# Patient Record
Sex: FEMALE | Race: OTHER | HISPANIC OR LATINO | Employment: FULL TIME | URBAN - METROPOLITAN AREA
[De-identification: names, ages, dates, MRNs, and addresses within clinical notes are randomized per-mention and may not be internally consistent; named-entity substitution may affect disease eponyms.]

---

## 2022-07-08 DIAGNOSIS — J40 BRONCHITIS: Primary | ICD-10-CM

## 2022-07-08 RX ORDER — AZITHROMYCIN 250 MG/1
TABLET, FILM COATED ORAL
Qty: 6 TABLET | Refills: 0 | Status: SHIPPED | OUTPATIENT
Start: 2022-07-08 | End: 2022-07-13

## 2022-07-08 RX ORDER — METHYLPREDNISOLONE 4 MG/1
TABLET ORAL
Qty: 21 EACH | Refills: 0 | Status: SHIPPED | OUTPATIENT
Start: 2022-07-08 | End: 2023-05-18 | Stop reason: ALTCHOICE

## 2022-11-11 DIAGNOSIS — B37.31 VAGINAL YEAST INFECTION: Primary | ICD-10-CM

## 2022-11-11 RX ORDER — FLUCONAZOLE 150 MG/1
TABLET ORAL
Qty: 3 TABLET | Refills: 1 | Status: SHIPPED | OUTPATIENT
Start: 2022-11-11 | End: 2022-11-17

## 2023-03-05 DIAGNOSIS — R30.0 DYSURIA: Primary | ICD-10-CM

## 2023-03-05 DIAGNOSIS — B37.31 VAGINAL YEAST INFECTION: ICD-10-CM

## 2023-03-05 RX ORDER — CEPHALEXIN 500 MG/1
500 CAPSULE ORAL EVERY 12 HOURS SCHEDULED
Qty: 10 CAPSULE | Refills: 0 | Status: SHIPPED | OUTPATIENT
Start: 2023-03-05 | End: 2023-03-10

## 2023-03-05 RX ORDER — FLUCONAZOLE 150 MG/1
150 TABLET ORAL ONCE
Qty: 2 TABLET | Refills: 0 | Status: SHIPPED | OUTPATIENT
Start: 2023-03-05 | End: 2023-03-05

## 2023-05-18 PROBLEM — E66.812 OBESITY, CLASS II, BMI 35-39.9: Status: ACTIVE | Noted: 2020-05-08

## 2023-08-26 ENCOUNTER — APPOINTMENT (OUTPATIENT)
Dept: LAB | Facility: HOSPITAL | Age: 33
End: 2023-08-26
Payer: COMMERCIAL

## 2023-08-26 DIAGNOSIS — Z00.8 HEALTH EXAMINATION IN POPULATION SURVEY: ICD-10-CM

## 2023-08-26 LAB
CHOLEST SERPL-MCNC: 113 MG/DL
EST. AVERAGE GLUCOSE BLD GHB EST-MCNC: 111 MG/DL
HBA1C MFR BLD: 5.5 %
HDLC SERPL-MCNC: 55 MG/DL
LDLC SERPL CALC-MCNC: 49 MG/DL (ref 0–100)
NONHDLC SERPL-MCNC: 58 MG/DL
TRIGL SERPL-MCNC: 43 MG/DL

## 2023-08-26 PROCEDURE — 83036 HEMOGLOBIN GLYCOSYLATED A1C: CPT

## 2023-08-26 PROCEDURE — 80061 LIPID PANEL: CPT

## 2023-08-26 PROCEDURE — 36415 COLL VENOUS BLD VENIPUNCTURE: CPT

## 2023-09-11 ENCOUNTER — TELEPHONE (OUTPATIENT)
Dept: FAMILY MEDICINE CLINIC | Facility: CLINIC | Age: 33
End: 2023-09-11

## 2023-09-11 ENCOUNTER — TELEPHONE (OUTPATIENT)
Dept: BARIATRICS | Facility: CLINIC | Age: 33
End: 2023-09-11

## 2023-09-11 DIAGNOSIS — R05.1 ACUTE COUGH: Primary | ICD-10-CM

## 2023-09-11 RX ORDER — METHYLPREDNISOLONE 4 MG/1
TABLET ORAL
Qty: 21 EACH | Refills: 0 | Status: SHIPPED | OUTPATIENT
Start: 2023-09-11

## 2023-09-11 RX ORDER — AZITHROMYCIN 250 MG/1
TABLET, FILM COATED ORAL
Qty: 6 TABLET | Refills: 0 | Status: SHIPPED | OUTPATIENT
Start: 2023-09-11 | End: 2023-09-16

## 2023-09-11 NOTE — TELEPHONE ENCOUNTER
Patient called stated that she is having a dry cough and some ratting and wheezing in chest, she has tried over the counter cough medication with no relief and she will like to know if you can call something in for her or if she should come in for a visit. Last office visit was 5/12/2023.

## 2023-09-13 ENCOUNTER — OFFICE VISIT (OUTPATIENT)
Dept: BARIATRICS | Facility: CLINIC | Age: 33
End: 2023-09-13
Payer: COMMERCIAL

## 2023-09-13 VITALS
DIASTOLIC BLOOD PRESSURE: 70 MMHG | SYSTOLIC BLOOD PRESSURE: 105 MMHG | WEIGHT: 205 LBS | RESPIRATION RATE: 17 BRPM | HEIGHT: 64 IN | BODY MASS INDEX: 35 KG/M2 | HEART RATE: 82 BPM

## 2023-09-13 DIAGNOSIS — E66.9 OBESITY, CLASS II, BMI 35-39.9: Primary | ICD-10-CM

## 2023-09-13 DIAGNOSIS — R73.03 PREDIABETES: ICD-10-CM

## 2023-09-13 PROBLEM — B96.89 BACTERIAL VAGINOSIS: Status: RESOLVED | Noted: 2017-08-30 | Resolved: 2023-09-13

## 2023-09-13 PROBLEM — R09.81 NASAL CONGESTION: Status: RESOLVED | Noted: 2018-01-10 | Resolved: 2023-09-13

## 2023-09-13 PROBLEM — R44.8 FACIAL PRESSURE: Status: RESOLVED | Noted: 2018-01-10 | Resolved: 2023-09-13

## 2023-09-13 PROBLEM — B37.9 YEAST INFECTION: Status: RESOLVED | Noted: 2017-04-28 | Resolved: 2023-09-13

## 2023-09-13 PROBLEM — N89.8 VAGINA ITCHING: Status: RESOLVED | Noted: 2017-08-30 | Resolved: 2023-09-13

## 2023-09-13 PROBLEM — N76.0 BACTERIAL VAGINOSIS: Status: RESOLVED | Noted: 2017-08-30 | Resolved: 2023-09-13

## 2023-09-13 PROBLEM — R53.83 FATIGUE: Status: RESOLVED | Noted: 2020-05-08 | Resolved: 2023-09-13

## 2023-09-13 PROCEDURE — 99214 OFFICE O/P EST MOD 30 MIN: CPT | Performed by: NURSE PRACTITIONER

## 2023-09-13 NOTE — ASSESSMENT & PLAN NOTE
- Patient is pursuing Conservative Program  - Initial weight loss goal of 5-10% weight loss for improved health  - On Contrave in the past, but did not feel well on it. - She is interested in trying another weight loss medication to help with weight loss. - Patient denies personal history of pancreatitis. Patient also denies personal and family history of thyroid cancer and multiple endocrine neoplasia type 2 (MEN 2 tumor). - Denies history of kidney stones, glaucoma or seizures. - Birth control recommended while on weight loss medications, as pregnancy not recommended while on weight loss medication. Reviewed with her that if she would want to try to conceive in the future, weight loss medication should be discontinued at least 3 months prior to trying to conceive. -Medication agreement signed May 18, 2023.  - Saxenda started May 2023 at weight of 224.1 lbs  - Continue Saxenda 3 mg daily, tolerating well and helping with appetite. - Labs reviewed: A1C and lipid panel 8/26/2023. A1C has reduced to 5.5 from 6.1 with weight loss and Saxenda. Lipid panel all within acceptable range. Initial: 224.1 lbs BMI 38.48  Current: 205 lbs BMI 35.19  Change: -19 lbs  Goal: 170 lbs      Goals:  Do not skip meals. Get protein with each meal.   Restart food logging. 1300 simba/day. Start exercise, such as walking 2 days a week for 10 minutes and gradually increase to goal of 5 days a week for 30 minutes cardiovascular exercise with 2 days resistance training.   Continue Saxenda

## 2023-09-13 NOTE — PROGRESS NOTES
Assessment/Plan:     Obesity, Class II, BMI 35-39.9   - Patient is pursuing Conservative Program  - Initial weight loss goal of 5-10% weight loss for improved health  - On Contrave in the past, but did not feel well on it. - She is interested in trying another weight loss medication to help with weight loss. - Patient denies personal history of pancreatitis. Patient also denies personal and family history of thyroid cancer and multiple endocrine neoplasia type 2 (MEN 2 tumor). - Denies history of kidney stones, glaucoma or seizures. - Birth control recommended while on weight loss medications, as pregnancy not recommended while on weight loss medication. Reviewed with her that if she would want to try to conceive in the future, weight loss medication should be discontinued at least 3 months prior to trying to conceive. -Medication agreement signed May 18, 2023.  - Saxenda started May 2023 at weight of 224.1 lbs  - Continue Saxenda 3 mg daily, tolerating well and helping with appetite. - Labs reviewed: A1C and lipid panel 8/26/2023. A1C has reduced to 5.5 from 6.1 with weight loss and Saxenda. Lipid panel all within acceptable range. Initial: 224.1 lbs BMI 38.48  Current: 205 lbs BMI 35.19  Change: -19 lbs  Goal: 170 lbs      Goals:  Do not skip meals. Get protein with each meal.   Restart food logging. 1300 simba/day. Start exercise, such as walking 2 days a week for 10 minutes and gradually increase to goal of 5 days a week for 30 minutes cardiovascular exercise with 2 days resistance training. Continue Saxenda    Prediabetes  - A1C improved from 6.1 to 5.5 with weight loss and Saxenda. Elliott Johnston was seen today for follow-up. Diagnoses and all orders for this visit:    Obesity, Class II, BMI 35-39.9  -     liraglutide (SAXENDA) injection;  Inject 0.5 mL (3 mg total) under the skin daily  -     Insulin Pen Needle 32G X 4 MM MISC; Use in the morning    Prediabetes  -     liraglutide [facility]) injection; Inject 0.5 mL (3 mg total) under the skin daily  -     Insulin Pen Needle 32G X 4 MM MISC; Use in the morning            Follow up in approximately 2 month nurse visit and 4 months with Non-Surgical Physician/Advanced Practitioner. Subjective:   Chief Complaint   Patient presents with   • Follow-up     MWM 4m f/u; waist-40in       Patient ID: Johnston Goldberg  is a 29 y.o. female with excess weight/obesity here to pursue weight management. Patient is pursuing Conservative Program.   Most recent notes and records were reviewed. HPI    Wt Readings from Last 10 Encounters:   09/13/23 93 kg (205 lb)   06/29/23 101 kg (223 lb 3.2 oz)   05/18/23 102 kg (224 lb 3.2 oz)   05/12/23 102 kg (224 lb 12.8 oz)   01/31/23 103 kg (228 lb)   04/11/22 97.5 kg (215 lb)   01/25/22 101 kg (222 lb)   12/23/21 99.8 kg (220 lb)   08/12/21 96.4 kg (212 lb 9.6 oz)   11/12/20 96.8 kg (213 lb 4.8 oz)     Taking Saxenda 3 mg daily. No negative side effects. Helping with appetite. On Contrave for short period of time in the past, didn't feel well on it. Was food logging, but fell out of it. Was getting around 5685-3881 calories per day.       B- 2 eggs and toast or banana   S- none  L- string cheese or chicken wrap or chicken sandwich with veggies   S- none  D- chicken or beef and salad and portioned starch  S- none    Hydration: 5-6 bottles of water, 1/2 cup coffee with creamer, soda 2-3 per week   Alcohol: very rare on holidays  Smoking: denies  Exercise: nothing formal, but more active in general    Occupation: [employer] work from home - sedentary   Sleep: 7.5-8 hours        Colonoscopy: N/A  Mammogram: N/A          The following portions of the patient's history were reviewed and updated as appropriate: allergies, current medications, past family history, past medical history, past social history, past surgical history, and problem list.    Family History   Problem Relation Age of Onset   • Thyroid disease Mother    • Asthma Mother    • Hypothyroidism Mother    • Diabetes Father    • Brain cancer Maternal Uncle    • Lung cancer Family    • Hypertension Neg Hx    • Heart disease Neg Hx    • Stroke Neg Hx         Review of Systems   HENT: Negative for sore throat. Respiratory: Positive for cough (intermittent due to allergies). Negative for shortness of breath. Cardiovascular: Negative for chest pain and palpitations. Gastrointestinal: Positive for constipation (intermittent). Negative for abdominal pain, diarrhea, nausea and vomiting.        + GERD diet and PRN medication controlled   Musculoskeletal: Negative for arthralgias and back pain. Skin: Negative for rash. Psychiatric/Behavioral: Negative for suicidal ideas (or HI). Denies depression and anxiety       Objective:  /70   Pulse 82   Resp 17   Ht 5' 4" (1.626 m)   Wt 93 kg (205 lb)   BMI 35.19 kg/m²     Physical Exam  Vitals and nursing note reviewed. Constitutional   General appearance: Abnormal.  well developed and obese. Eyes No conjunctival injection. Ears, Nose, Mouth, and Throat Oral mucosa moist.   Pulmonary   Respiratory effort: No increased work of breathing or signs of respiratory distress. Cardiovascular     Examination of extremities for edema and/or varicosities: Normal.  no edema. Abdomen   Abdomen: Abnormal.  The abdomen was obese.     Musculoskeletal   Normal range of motion  Neurological   Gait and station: Normal.    Psychiatric   Orientation to person, place and time: Normal.    Affect: appropriate

## 2023-11-22 ENCOUNTER — CLINICAL SUPPORT (OUTPATIENT)
Dept: FAMILY MEDICINE CLINIC | Facility: CLINIC | Age: 33
End: 2023-11-22
Payer: COMMERCIAL

## 2023-11-22 DIAGNOSIS — Z23 ENCOUNTER FOR IMMUNIZATION: Primary | ICD-10-CM

## 2023-11-22 PROCEDURE — 90471 IMMUNIZATION ADMIN: CPT

## 2023-11-22 PROCEDURE — 90686 IIV4 VACC NO PRSV 0.5 ML IM: CPT

## 2023-12-05 ENCOUNTER — TELEPHONE (OUTPATIENT)
Dept: BARIATRICS | Facility: CLINIC | Age: 33
End: 2023-12-05

## 2024-01-24 ENCOUNTER — OFFICE VISIT (OUTPATIENT)
Dept: BARIATRICS | Facility: CLINIC | Age: 34
End: 2024-01-24
Payer: COMMERCIAL

## 2024-01-24 VITALS
BODY MASS INDEX: 33.02 KG/M2 | HEIGHT: 64 IN | SYSTOLIC BLOOD PRESSURE: 116 MMHG | WEIGHT: 193.4 LBS | RESPIRATION RATE: 16 BRPM | DIASTOLIC BLOOD PRESSURE: 64 MMHG | HEART RATE: 81 BPM

## 2024-01-24 DIAGNOSIS — E66.9 OBESITY (BMI 30.0-34.9): Primary | ICD-10-CM

## 2024-01-24 DIAGNOSIS — R73.03 PREDIABETES: ICD-10-CM

## 2024-01-24 PROBLEM — B37.31 VAGINAL CANDIDIASIS: Status: RESOLVED | Noted: 2021-12-23 | Resolved: 2024-01-24

## 2024-01-24 PROBLEM — E66.811 OBESITY (BMI 30.0-34.9): Status: ACTIVE | Noted: 2020-05-08

## 2024-01-24 PROCEDURE — 99213 OFFICE O/P EST LOW 20 MIN: CPT | Performed by: NURSE PRACTITIONER

## 2024-01-24 NOTE — ASSESSMENT & PLAN NOTE
- Patient is pursuing Conservative Program  - Initial weight loss goal of 5-10% weight loss for improved health  - On Contrave in the past, but did not feel well on it.   - Patient denies personal history of pancreatitis. Patient also denies personal and family history of thyroid cancer and multiple endocrine neoplasia type 2 (MEN 2 tumor).   - Denies history of kidney stones, glaucoma or seizures.   - Birth control recommended while on weight loss medications, as pregnancy not recommended while on weight loss medication.    -Medication agreement signed May 18, 2023.  - Saxenda started May 2023 at weight of 224.1 lbs  - Continue Saxenda 3 mg daily, tolerating well and helping with appetite. She will not be renewing this due to cost. Reviewed that this can be stopped abruptly.   - Discussed other options, such as phentermine and metformin off label. Teratogenicity of Topamax discussed. Wegovy remains on supply chain shortage. Zepbound not currently covered by insurance. She is not interested in trying another medication currently. She will continue to work on lifestyle modifications, but in the future can consider restarting medication if needed.       Initial: 224.1 lbs BMI 38.48  Last visit: 205 lbs BMI 35.19  Current: 193.4 lbs BMI 33.20  Change: -31 lbs (-12 lbs since last OV)  Goal: 170 lbs      Goals:  Do not skip meals.  Get protein with each meal. Can use protein bars or shakes to supplement.  Restart food logging.  1300 simba/day.   Keep up the great work with exercise and gradually increase walking to goal of 5 days per week for 30 minutes. Continue weight training.

## 2024-01-24 NOTE — PROGRESS NOTES
Assessment/Plan:     Obesity (BMI 30.0-34.9)   - Patient is pursuing Conservative Program  - Initial weight loss goal of 5-10% weight loss for improved health  - On Contrave in the past, but did not feel well on it.   - Patient denies personal history of pancreatitis. Patient also denies personal and family history of thyroid cancer and multiple endocrine neoplasia type 2 (MEN 2 tumor).   - Denies history of kidney stones, glaucoma or seizures.   - Birth control recommended while on weight loss medications, as pregnancy not recommended while on weight loss medication.    -Medication agreement signed May 18, 2023.  - Saxenda started May 2023 at weight of 224.1 lbs  - Continue Saxenda 3 mg daily, tolerating well and helping with appetite. She will not be renewing this due to cost. Reviewed that this can be stopped abruptly.   - Discussed other options, such as phentermine and metformin off label. Teratogenicity of Topamax discussed. Wegovy remains on supply chain shortage. Zepbound not currently covered by insurance. She is not interested in trying another medication currently. She will continue to work on lifestyle modifications, but in the future can consider restarting medication if needed.       Initial: 224.1 lbs BMI 38.48  Last visit: 205 lbs BMI 35.19  Current: 193.4 lbs BMI 33.20  Change: -31 lbs (-12 lbs since last OV)  Goal: 170 lbs      Goals:  Do not skip meals.  Get protein with each meal. Can use protein bars or shakes to supplement.  Restart food logging.  1300 simba/day.   Keep up the great work with exercise and gradually increase walking to goal of 5 days per week for 30 minutes. Continue weight training.    Prediabetes  - A1C improved from 6.1 to 5.5 with weight loss and Saxenda.         Ciera was seen today for follow-up.    Diagnoses and all orders for this visit:    Obesity (BMI 30.0-34.9)    Prediabetes              Follow up in approximately  2 month nurse visit and 4 months  with Non-Surgical  Physician/Advanced Practitioner.    Subjective:   Chief Complaint   Patient presents with    Follow-up     MWM- 4mth f/u, Waist 38in       Patient ID: Ciera Maria  is a 33 y.o. female with excess weight/obesity here to pursue weight management.  Patient is pursuing Conservative Program.   Most recent notes and records were reviewed.    HPI    Wt Readings from Last 10 Encounters:   24 87.7 kg (193 lb 6.4 oz)   23 93 kg (205 lb)   23 101 kg (223 lb 3.2 oz)   23 102 kg (224 lb 3.2 oz)   23 102 kg (224 lb 12.8 oz)   23 103 kg (228 lb)   22 97.5 kg (215 lb)   22 101 kg (222 lb)   21 99.8 kg (220 lb)   21 96.4 kg (212 lb 9.6 oz)     Taking Saxenda 3 mg daily. No negative side effects. Helping with appetite. Is currently using last pen of Saxenda. Coupon  and is too costly, therefore she wants to stop the medication after she runs out of it.    On Contrave for short period of time in the past, didn't feel well on it.    Not food logging, but being mindful of portions.       B- 2 eggs and toast and sometimes fruit   S- none  L- skips or chicken salad or turkey wrap or turkey club or stir fernandez    S- none  D- chicken salad or chicken stir fernandez with veggies and rice   S- none    Hydration: 5-6 bottles of water, 1/2 cup coffee with creamer, occ soda   Alcohol: very rare on holidays  Smoking: denies  Exercise: walking on treadmill 3 times per week for 15-20 minutes and weight training     Occupation: [employer] work from home - sedentary   Sleep: 7.5-8 hours        Colonoscopy: N/A  Mammogram: N/A          The following portions of the patient's history were reviewed and updated as appropriate: allergies, current medications, past family history, past medical history, past social history, past surgical history, and problem list.    Family History   Problem Relation Age of Onset    Thyroid disease Mother     Asthma Mother     Hypothyroidism Mother   "   Diabetes Father     Brain cancer Maternal Uncle     Lung cancer Family     Hypertension Neg Hx     Heart disease Neg Hx     Stroke Neg Hx         Review of Systems   HENT:  Negative for sore throat.    Respiratory:  Negative for cough and shortness of breath.    Cardiovascular:  Negative for chest pain and palpitations.   Gastrointestinal:  Positive for constipation (intermittent). Negative for abdominal pain, diarrhea, nausea and vomiting.        + GERD diet and PRN medication controlled   Musculoskeletal:  Negative for arthralgias and back pain.   Skin:  Negative for rash.   Psychiatric/Behavioral:  Negative for suicidal ideas (or HI).         Denies depression and anxiety       Objective:  /64   Pulse 81   Resp 16   Ht 5' 4\" (1.626 m)   Wt 87.7 kg (193 lb 6.4 oz)   BMI 33.20 kg/m²     Physical Exam  Vitals and nursing note reviewed.        Constitutional   General appearance: Abnormal.  well developed and obese.   Eyes No conjunctival injection.   Ears, Nose, Mouth, and Throat Oral mucosa moist.   Pulmonary   Respiratory effort: No increased work of breathing or signs of respiratory distress.     Cardiovascular     Examination of extremities for edema and/or varicosities: Normal.  no edema.   Abdomen   Abdomen: Abnormal.  The abdomen was obese.    Musculoskeletal   Normal range of motion  Neurological   Gait and station: Normal.    Psychiatric   Orientation to person, place and time: Normal.    Affect: appropriate        "

## 2024-02-05 ENCOUNTER — PATIENT MESSAGE (OUTPATIENT)
Dept: BARIATRICS | Facility: CLINIC | Age: 34
End: 2024-02-05

## 2024-02-05 DIAGNOSIS — E66.9 OBESITY (BMI 30.0-34.9): Primary | ICD-10-CM

## 2024-02-08 ENCOUNTER — TELEPHONE (OUTPATIENT)
Dept: BARIATRICS | Facility: CLINIC | Age: 34
End: 2024-02-08

## 2024-02-12 ENCOUNTER — TELEPHONE (OUTPATIENT)
Dept: OBGYN CLINIC | Facility: CLINIC | Age: 34
End: 2024-02-12

## 2024-02-12 NOTE — TELEPHONE ENCOUNTER
Left voicemail for patient to call office to reschedule appointment for yearly on 2/13/24, appointment being canceled due to weather and office will be closed.

## 2024-02-16 ENCOUNTER — TELEPHONE (OUTPATIENT)
Dept: OBGYN CLINIC | Facility: MEDICAL CENTER | Age: 34
End: 2024-02-16

## 2024-02-16 NOTE — TELEPHONE ENCOUNTER
Pt is having vaginal itching and irritation for a week, has history of yeast infection,and wanted to know if there is any OTC she can use.

## 2024-03-01 ENCOUNTER — ANNUAL EXAM (OUTPATIENT)
Age: 34
End: 2024-03-01
Payer: COMMERCIAL

## 2024-03-01 VITALS
SYSTOLIC BLOOD PRESSURE: 120 MMHG | WEIGHT: 201 LBS | HEIGHT: 64 IN | DIASTOLIC BLOOD PRESSURE: 72 MMHG | BODY MASS INDEX: 34.31 KG/M2

## 2024-03-01 DIAGNOSIS — Z01.419 ENCOUNTER FOR GYNECOLOGICAL EXAMINATION: Primary | ICD-10-CM

## 2024-03-01 PROCEDURE — S0612 ANNUAL GYNECOLOGICAL EXAMINA: HCPCS | Performed by: OBSTETRICS & GYNECOLOGY

## 2024-03-01 NOTE — PROGRESS NOTES
ASSESSMENT & PLAN: Ciera Maria is a 34 y.o.  with normal gynecologic exam.    1.  Routine well woman exam done today  2.  Pap and HPV:  The patient's last pap and hpv was .    It was normal.    Pap and cotesting was not done today.    Current ASCCP Guidelines reviewed.   3.  The following were reviewed in today's visit: breast self exam, family planning choices, exercise, and healthy diet.      CC:  Annual Gynecologic Examination    HPI: Ciera Maria is a 34 y.o.  who presents for annual gynecologic examination.  She has the following concerns:  occasional itching at area around clitoris         Health Maintenance:    She wears her seatbelt routinely.    She does perform regular monthly self breast exams.    She feels safe at home.     Past Medical History:   Diagnosis Date    Allergic rhinitis     Asthma     Diabetes mellitus (HCC)     H/O postpartum hemorrhage, currently pregnant     Hx of preeclampsia, prior pregnancy, currently pregnant     Hypertension     Iron deficiency anemia     last assessed 2014    Migraine     Urinary tract infection     Varicella     Visual impairment     Vitamin D deficiency        Past Surgical History:   Procedure Laterality Date    CHOLECYSTECTOMY      WISDOM TOOTH EXTRACTION         Past OB/Gyn History:  OB History          3    Para   2    Term   2            AB   1    Living   2         SAB   1    IAB        Ectopic        Multiple   0    Live Births   2           Obstetric Comments   Preeclampsia, third trimester                 Family History   Problem Relation Age of Onset    Thyroid disease Mother     Asthma Mother     Hypothyroidism Mother     Diabetes Father     Brain cancer Maternal Uncle     Lung cancer Family     Hypertension Neg Hx     Heart disease Neg Hx     Stroke Neg Hx        Social History:  Social History     Socioeconomic History    Marital status: /Civil Union     Spouse name: Not on file    Number of  children: Not on file    Years of education: Not on file    Highest education level: Not on file   Occupational History    Not on file   Tobacco Use    Smoking status: Never    Smokeless tobacco: Never   Vaping Use    Vaping status: Never Used   Substance and Sexual Activity    Alcohol use: Yes     Comment: social    Drug use: No    Sexual activity: Yes     Partners: Male     Birth control/protection: None   Other Topics Concern    Not on file   Social History Narrative    Caffeine use     Social Determinants of Health     Financial Resource Strain: Not on file   Food Insecurity: Not on file   Transportation Needs: Not on file   Physical Activity: Not on file   Stress: Not on file   Social Connections: Not on file   Intimate Partner Violence: Not on file   Housing Stability: Not on file         No Known Allergies      Current Outpatient Medications:     albuterol (PROVENTIL HFA,VENTOLIN HFA) 90 mcg/act inhaler, Inhale 2 puffs as needed for wheezing, Disp: , Rfl:     ascorbic acid (VITAMIN C) 500 mg tablet, Take 500 mg by mouth daily, Disp: , Rfl:     cyanocobalamin (VITAMIN B-12) 1000 MCG tablet, Take 1,000 mcg by mouth daily, Disp: , Rfl:     multivitamin (THERAGRAN) TABS, Take 1 tablet by mouth daily, Disp: , Rfl:     Naltrexone-buPROPion HCl ER 8-90 MG TB12, Take 1 tablet daily by mouth in the morning for one week, then 1 tablet twice a day., Disp: 60 tablet, Rfl: 3      Review of Systems  Constitutional :no fever, feels well, no tiredness, no recent weight gain or loss  ENT: no ear ache, no loss of hearing, no nosebleeds or nasal discharge, no sore throat or hoarseness.  Cardiovascular: no complaints of slow or fast heart beat, no chest pain, no palpitations, no leg claudication or lower extremity edema.  Respiratory: no complaints of shortness of shortness of breath, no MCCANN  Breasts:no complaints of breast pain, breast lump, or nipple discharge  Gastrointestinal: no complaints of abdominal pain, constipation,  "nausea, vomiting, or diarrhea or bloody stools  Genitourinary : no complaints of dysuria, incontinence, pelvic pain, no dysmenorrhea, vaginal discharge or abnormal vaginal bleeding and as noted in HPI.  Musculoskeletal: no complaints of arthralgia, no myalgia, no joint swelling or stiffness, no limb pain or swelling.  Integumentary: no complaints of skin rash or lesion, itching or dry skin  Neurological: no complaints of headache, no confusion, no numbness or tingling, no dizziness or fainting    Objective      /72   Ht 5' 4\" (1.626 m)   Wt 91.2 kg (201 lb)   LMP 02/23/2024 (Approximate)   BMI 34.50 kg/m²   General:   appears stated age, cooperative, alert normal mood and affect   Lungs: Unlabored breathing     Breasts: normal appearance, no masses or tenderness   Abdomen: soft, non-tender, without masses or organomegaly   Vulva: normal, normal female genitalia, Bartholin's, Urethra, [place] normal, no lesions, normal female hair distribution   Vagina: normal vagina, no discharge, exudate, lesion, or erythema   Urethra: normal   Cervix: Normal, no discharge. Nontender.   Uterus: normal size, contour, position, consistency, mobility, non-tender   Adnexa: no mass, fullness, tenderness   Psychiatric orientation to person, place, and time: normal. mood and affect: normal      "

## 2024-03-05 ENCOUNTER — TELEPHONE (OUTPATIENT)
Age: 34
End: 2024-03-05

## 2024-03-05 NOTE — TELEPHONE ENCOUNTER
PA for Contrave    Submitted via    []CMM-KEY    [x]Surescripts-Case ID #  [id number]   []Faxed to plan   []Other website    []Phone call Case ID #      Office notes sent, clinical questions answered. Awaiting determination    Turnaround time for your insurance to make a decision on your Prior Authorization can take 7-21 business days.

## 2024-03-05 NOTE — TELEPHONE ENCOUNTER
Regarding: Weight loss pills  Contact: [phone]  ----- Message from GUILLERMO Pedro sent at 3/5/2024  7:55 AM EST -----  Contrave submitted 2/6/2024. Any news on prior auth?     ----- Message sent from GUILLERMO Pedro to Ciera Maria at 3/5/2024  7:55 AM -----   Gigi KingaraDoroteo for reaching out. I will check with the prior auth team.     Doroteo,  Ella      ----- Message -----       From:Ciera Maria       Sent:3/4/2024 10:16 PM EST         To:Patient Medical Advice Request Message List    Subject:Weight loss pills    Hi Ella.   Any news from the insurance regarding the prior authorization for Contrave ?      ----- Message -----       From:GUILLERMO Goldstein       Sent:2/6/2024  4:16 PM EST         To:Ciera Maria    Subject:Weight loss pills    Gigi Vang,     I sent the Contrave to [facility]. My office will take care of the prior auth. This could take up to 3 weeks for the insurance to process.     Start Contrave 1 tablet daily in the morning for one week, then 1 tablet twice a day. After about one month, give me an update, as I may need to increase it further.     Potential side effects of Contrave: abdominal upset, headache, dizziness, trouble sleeping, increased blood pressure, depression/anxiety, and fatigue. Patient should call/return if he/she develops symptoms of depression/aniety. Patient should have ER evaluation if thoughts of harming self/others occur. You may feel more nauseated if consuming alcohol while taking Contrave.    - Contrave must be weaned off gradually prior to surgery. Please contact the office if you will be having surgery for instructions regarding how to wean Contrave.     Ella Sadler      ----- Message -----       From:Ciera Maria       Sent:2/6/2024 12:43 PM EST         To:Patient Medical Advice Request Message List    Subject:Weight loss pills    [facility]. Thanks again!      ----- Message -----       From:Ella  GUILLERMO Ramirez       Sent:2/6/2024 12:41 PM EST         To:Ciera Maria    Subject:Weight loss pills    Hi Ciera,     No problem, I can submit the Contrave. Which pharmacy is best?    Ella Sadler      ----- Message -----       From:Ciera Maria       Sent:2/6/2024  9:14 AM EST         To:Patient Medical Advice Request Message List    Subject:Weight loss pills    If you can submit it as Contrave and see how much it is that would be great. I would like to try it again because in the past the symptoms were similar to Saxenda and with time those symptoms went away so I am hoping the same with Contrave. I was only on it for about a month so I'd be willing to give it a shot! I appreciate your help Ella. Have a great day!      ----- Message -----       From:GUILLERMO Goldstein       Sent:2/6/2024  9:05 AM EST         To:Ciera Maria    Subject:Weight loss pills    Hi Ciera,     Thanks for reaching out. I had in a previous note that you did not feel well on Contrave in the past, but we can certainly try it again. There is a patient assistance program at [employer] It has been costly for some people even when the insurance covers it, sometimes as much as $100 per month. Another option is to separate the medications (they are both available in generic form) it is considered off label use when I do it this way, but it is much more cost effective. I can submit it as Contrave and see what the cost is or go with the generic route. Let me know which you would prefer.     Ella Sadler      ----- Message -----       From:Ciera Maria       Sent:2/5/2024 10:55 PM EST         To:GUILLERMO Goldstein    Subject:Weight loss pills    Hi Ella.   Hope you are well. I have been researching about the weight loss medication options we discussed and I'd like your thoughts with me trying Contrave. I would like to lose another 20-25 lb and would like to try while taking the medication. Let me know your thoughts and I was  also wondering if you had any idea on the cost of the medication with my insurance and if there is a savings program for the medication.   Thank you!

## 2024-03-08 ENCOUNTER — NURSE TRIAGE (OUTPATIENT)
Age: 34
End: 2024-03-08

## 2024-03-08 DIAGNOSIS — B37.9 YEAST INFECTION: Primary | ICD-10-CM

## 2024-03-08 RX ORDER — FLUCONAZOLE 150 MG/1
150 TABLET ORAL ONCE
Qty: 1 TABLET | Refills: 0 | Status: SHIPPED | OUTPATIENT
Start: 2024-03-08 | End: 2024-03-08

## 2024-03-08 NOTE — TELEPHONE ENCOUNTER
Regarding: vaginal itching  ----- Message from Alysia Wright sent at 3/8/2024  8:11 AM EST -----  Patient has been having vaginal itching for 3 days and has tried Monostat as well. Looking for recommendations as to a script or an appt. Please advise.

## 2024-03-08 NOTE — TELEPHONE ENCOUNTER
Patient seen for annual by Dr. Beth 3/1.  Began 3 days ago with  internal and external odorless itching and burning with thick whit discharge . Has used 3 nights of monostat 7 but symptoms continue to be severe.  Patient requesting prescription for diflucan  and has good results as has history of yeast infection's.   Pharm is [facility].

## 2024-03-15 NOTE — TELEPHONE ENCOUNTER
PA for Contrave Denied    Reason:PA Case: [id number], Status: Denied, Denial Rationale: Your medication request has been denied as it does not appear to meet medically necessary requirements. Plan rules require clinical parameters (diagnosis, lab values, test results, physical exam findings etc) be met for medical necessity approval. Information submitted does not indicate required parameter results were met. Coverage for Contrave 8-90MG Tablet Extended Release 12 Hour* is denied. It does not meet medical necessity. Contrave 8-90MG Tablet Extended Release 12 Hour* has been requested for the treatment of Obesity. The information provided by your prescriber does not meet [facility] Rx's guideline. The guideline used is: Weight Loss Agents Prior Authorization with Quantity Limit and Coverage Exception Criteria. Information provided does not show that the policy requirements have been met. The requirement(s) not met are: - prior trial of a weight loss agent for a previous course of treatment in the past 12 months AND prescriber anticipates success with repeating treatment; AND ONE of the following: The trial and failure of at least ONE MORE formulary alternative medications such as: - Phentermine - Qsymia - Wegovy; OR information stating that ALL available formulary alternatives are not recommended, likely to be less effective, or will cause an harmful reaction or other harm to the patient (detailed rationale must be provided). *Please note: Preferred formulary alternatives may require a prior authorization review. Therefore, coverage for Contrave 8-90MG Tablet Extended Release 12 Hour* is denied.         Message sent to office clinical pool Yes    Denial letter scanned into Media Yes    Appeal started No

## 2024-03-19 ENCOUNTER — TELEPHONE (OUTPATIENT)
Dept: BARIATRICS | Facility: CLINIC | Age: 34
End: 2024-03-19

## 2024-03-19 NOTE — TELEPHONE ENCOUNTER
Called patient and left a message to give the office a call back to reschedule her 3/22/24 Nurse Visit appointment that she had cancelled through My Chart.

## 2024-03-26 ENCOUNTER — TELEPHONE (OUTPATIENT)
Dept: FAMILY MEDICINE CLINIC | Facility: CLINIC | Age: 34
End: 2024-03-26

## 2024-03-26 DIAGNOSIS — H10.30 ACUTE CONJUNCTIVITIS, UNSPECIFIED ACUTE CONJUNCTIVITIS TYPE, UNSPECIFIED LATERALITY: Primary | ICD-10-CM

## 2024-03-26 RX ORDER — NEOMYCIN SULFATE, POLYMYXIN B SULFATE AND DEXAMETHASONE 3.5; 10000; 1 MG/ML; [USP'U]/ML; MG/ML
SUSPENSION/ DROPS OPHTHALMIC
Qty: 5 ML | Refills: 0 | Status: SHIPPED | OUTPATIENT
Start: 2024-03-26

## 2024-03-26 NOTE — TELEPHONE ENCOUNTER
Patient called stated that her son was diagnosed with pink eye and now she is showing symptoms with her left eye. Can something be called in for her?

## 2024-04-09 ENCOUNTER — HOSPITAL ENCOUNTER (EMERGENCY)
Facility: HOSPITAL | Age: 34
Discharge: HOME | End: 2024-04-09
Attending: EMERGENCY MEDICINE
Payer: COMMERCIAL

## 2024-04-09 VITALS
RESPIRATION RATE: 17 BRPM | SYSTOLIC BLOOD PRESSURE: 122 MMHG | TEMPERATURE: 97 F | OXYGEN SATURATION: 97 % | HEART RATE: 67 BPM | DIASTOLIC BLOOD PRESSURE: 68 MMHG

## 2024-04-09 DIAGNOSIS — R11.2 NAUSEA AND VOMITING: Primary | ICD-10-CM

## 2024-04-09 LAB
EXT PREGNANCY TEST URINE: NEGATIVE
EXT. CONTROL: NORMAL

## 2024-04-09 PROCEDURE — 81025 URINE PREGNANCY TEST: CPT

## 2024-04-09 PROCEDURE — 99283 EMERGENCY DEPT VISIT LOW MDM: CPT

## 2024-04-09 PROCEDURE — 99284 EMERGENCY DEPT VISIT MOD MDM: CPT | Performed by: EMERGENCY MEDICINE

## 2024-04-09 RX ORDER — ONDANSETRON 4 MG/1
4 TABLET, ORALLY DISINTEGRATING ORAL ONCE
Status: COMPLETED | OUTPATIENT
Start: 2024-04-09 | End: 2024-04-09

## 2024-04-09 RX ORDER — ONDANSETRON 4 MG/1
4 TABLET, FILM COATED ORAL EVERY 6 HOURS
Qty: 12 TABLET | Refills: 0 | Status: SHIPPED | OUTPATIENT
Start: 2024-04-09

## 2024-04-09 RX ADMIN — ONDANSETRON 4 MG: 4 TABLET, ORALLY DISINTEGRATING ORAL at 18:06

## 2024-04-09 NOTE — DISCHARGE INSTRUCTIONS
You were evaluated in the Emergency Department today for Nausea and Vomiting.     Can take tylenol for pain control.    Please schedule an appointment with your primary care physician within the next 2-3 days.    Return to the Emergency Department if you experience worsening or uncontrolled pain, fevers 100.4°F or greater, recurrent vomiting, inability to tolerate food or fluids by mouth, bloody stools or vomit, black or tarry stools, or any other concerning symptoms.    Thank you for choosing us for your care.

## 2024-04-09 NOTE — ED PROVIDER NOTES
History  Chief Complaint   Patient presents with    Vomiting     Started bupropion and naltrexone this morning and has vomited x4 since taking them. Reports feeling shaky     34-year-old female with past medical history of asthma, DM, HTN, migraine presents to the ED for evaluation of nausea vomiting and lightheadedness for the past 4 hours after taking naltrexone and bupropion for the first time.  Patient notes she took the medication at 10 AM this morning and developed nausea and vomiting around 1:00.  Patient had 4 episodes of projectile nonbloody nonbilious vomiting.  Patient had an episode of dry heaving and trouble breathing 1 hour prior to arrival.  Patient notes her symptoms have since resolved.  Patient denies recent travels, sick contact exposure, fever, chills, chest pain, abdominal pain, dysuria      History provided by:  Patient   used: No        Prior to Admission Medications   Prescriptions Last Dose Informant Patient Reported? Taking?   albuterol (PROVENTIL HFA,VENTOLIN HFA) 90 mcg/act inhaler   Yes No   Sig: Inhale 2 puffs as needed for wheezing   ascorbic acid (VITAMIN C) 500 mg tablet  Self Yes No   Sig: Take 500 mg by mouth daily   cyanocobalamin (VITAMIN B-12) 1000 MCG tablet  Self Yes No   Sig: Take 1,000 mcg by mouth daily   multivitamin (THERAGRAN) TABS   Yes No   Sig: Take 1 tablet by mouth daily   neomycin-polymyxin-dexamethasone (MAXITROL) ophthalmic suspension   No No   Sig: One drop 4 times daily both eyes      Facility-Administered Medications: None       Past Medical History:   Diagnosis Date    Allergic rhinitis     Asthma     Diabetes mellitus (HCC)     H/O postpartum hemorrhage, currently pregnant     Hx of preeclampsia, prior pregnancy, currently pregnant     Hypertension     Iron deficiency anemia     last assessed 02/28/2014    Migraine     Urinary tract infection     Varicella     Visual impairment     Vitamin D deficiency        Past Surgical History:    Procedure Laterality Date    CHOLECYSTECTOMY      WISDOM TOOTH EXTRACTION         Family History   Problem Relation Age of Onset    Thyroid disease Mother     Asthma Mother     Hypothyroidism Mother     Diabetes Father     Brain cancer Maternal Uncle     Lung cancer Family     Hypertension Neg Hx     Heart disease Neg Hx     Stroke Neg Hx      I have reviewed and agree with the history as documented.    E-Cigarette/Vaping    E-Cigarette Use Never User      E-Cigarette/Vaping Substances    Nicotine No     THC No     CBD No     Flavoring No     Other No     Unknown No      Social History     Tobacco Use    Smoking status: Never    Smokeless tobacco: Never   Vaping Use    Vaping status: Never Used   Substance Use Topics    Alcohol use: Yes     Comment: social    Drug use: No        Review of Systems   Constitutional:  Negative for appetite change, diaphoresis, fever and unexpected weight change.   HENT:  Negative for dental problem, ear pain, facial swelling, sore throat and trouble swallowing.    Eyes:  Negative for pain and visual disturbance.   Respiratory:  Negative for cough, chest tightness and shortness of breath.    Cardiovascular:  Negative for chest pain, palpitations and leg swelling.   Gastrointestinal:  Positive for nausea and vomiting. Negative for abdominal distention, abdominal pain, constipation and diarrhea.   Endocrine: Negative for polyuria.   Genitourinary:  Negative for difficulty urinating, dysuria and hematuria.   Musculoskeletal:  Negative for back pain.   Neurological:  Positive for light-headedness. Negative for dizziness, syncope and headaches.   Psychiatric/Behavioral:  Negative for confusion.    All other systems reviewed and are negative.      Physical Exam  ED Triage Vitals   Temperature Pulse Respirations Blood Pressure SpO2   04/09/24 1724 04/09/24 1724 04/09/24 1724 04/09/24 1726 04/09/24 1724   (!) 97 °F (36.1 °C) 67 17 122/68 97 %      Temp src Heart Rate Source Patient Position -  Orthostatic VS BP Location FiO2 (%)   -- -- 04/09/24 1724 04/09/24 1724 --     Lying Right arm       Pain Score       04/09/24 1724       No Pain             Orthostatic Vital Signs  Vitals:    04/09/24 1724 04/09/24 1726   BP:  122/68   Pulse: 67    Patient Position - Orthostatic VS: Lying        Physical Exam  Vitals and nursing note reviewed.   Constitutional:       General: She is not in acute distress.     Appearance: Normal appearance. She is well-developed. She is not ill-appearing, toxic-appearing or diaphoretic.      Comments: Patient resting comfortably on stretcher in no acute distress.  Patient talking and laughing with her family members.   HENT:      Head: Normocephalic and atraumatic.      Right Ear: External ear normal.      Left Ear: External ear normal.      Nose: Nose normal.      Mouth/Throat:      Mouth: Mucous membranes are moist.   Eyes:      General: No scleral icterus.        Right eye: No discharge.      Extraocular Movements: Extraocular movements intact.      Conjunctiva/sclera: Conjunctivae normal.   Cardiovascular:      Rate and Rhythm: Normal rate and regular rhythm.      Pulses: Normal pulses.      Heart sounds: No murmur heard.  Pulmonary:      Effort: Pulmonary effort is normal. No respiratory distress.      Breath sounds: Normal breath sounds. No wheezing.   Chest:      Chest wall: No tenderness.   Abdominal:      General: Abdomen is flat. There is no distension.      Palpations: Abdomen is soft.      Tenderness: There is no abdominal tenderness.   Musculoskeletal:         General: No swelling, deformity or signs of injury. Normal range of motion.      Cervical back: Normal range of motion and neck supple. No rigidity or tenderness.      Right lower leg: No edema.      Left lower leg: No edema.   Skin:     General: Skin is warm and dry.      Capillary Refill: Capillary refill takes less than 2 seconds.   Neurological:      General: No focal deficit present.      Mental Status: She  is alert and oriented to person, place, and time.      Cranial Nerves: No cranial nerve deficit.      Sensory: No sensory deficit.      Motor: No weakness.      Coordination: Coordination normal.      Gait: Gait normal.   Psychiatric:         Mood and Affect: Mood normal.         Behavior: Behavior normal.         ED Medications  Medications   ondansetron (ZOFRAN-ODT) dispersible tablet 4 mg (4 mg Oral Given 4/9/24 1806)       Diagnostic Studies  Results Reviewed       Procedure Component Value Units Date/Time    POCT pregnancy, urine [[id number]]  (Normal) Resulted: 04/09/24 1815    Lab Status: Final result Updated: 04/09/24 1827     EXT Preg Test, Ur Negative     Control Valid                   No orders to display         Procedures  Procedures      ED Course                             SBIRT 22yo+      Flowsheet Row Most Recent Value   Initial Alcohol Screen: US AUDIT-C     1. How often do you have a drink containing alcohol? 0 Filed at: 04/09/2024 1725   2. How many drinks containing alcohol do you have on a typical day you are drinking?  0 Filed at: 04/09/2024 1725   3a. Male UNDER 65: How often do you have five or more drinks on one occasion? 0 Filed at: 04/09/2024 1725   3b. FEMALE Any Age, or MALE 65+: How often do you have 4 or more drinks on one occassion? 0 Filed at: 04/09/2024 1725   Audit-C Score 0 Filed at: 04/09/2024 1725   [place]: How many times in the past year have you...    Used an illegal drug or used a prescription medication for non-medical reasons? Never Filed at: 04/09/2024 1725                  Medical Decision Making  34-year-old female with past medical history of asthma, DM, HTN, migraine presents to the ED for evaluation of nausea vomiting and lightheadedness for the past 4 hours after taking naltrexone and bupropion for the first time.    Symptoms most consistent with medication side effect.  Will test for urine pregnancy and treat symptomatically with Zofran  Will p.o. challenge  Patient  discharged home with follow-up with weight loss clinic    Amount and/or Complexity of Data Reviewed  Labs: ordered.    Risk  Prescription drug management.          Disposition  Final diagnoses:   Nausea and vomiting     Time reflects when diagnosis was documented in both MDM as applicable and the Disposition within this note       Time User Action Codes Description Comment    4/9/2024  7:22 PM Jae Morrow Add [R11.2] Nausea and vomiting           ED Disposition       ED Disposition   Discharge    Condition   Stable    Date/Time   Tue Apr 9, 2024 1922    Comment   Ciera Maria discharge to home/self care.                   Follow-up Information       Follow up With Specialties Details Why Contact Info    Sumit Ramírez DO Family Medicine   [facility] [address]  [phone]              Discharge Medication List as of 4/9/2024  7:24 PM        CONTINUE these medications which have NOT CHANGED    Details   albuterol (PROVENTIL HFA,VENTOLIN HFA) 90 mcg/act inhaler Inhale 2 puffs as needed for wheezing, Historical Med      ascorbic acid (VITAMIN C) 500 mg tablet Take 500 mg by mouth daily, Historical Med      cyanocobalamin (VITAMIN B-12) 1000 MCG tablet Take 1,000 mcg by mouth daily, Historical Med      multivitamin (THERAGRAN) TABS Take 1 tablet by mouth daily, Historical Med      neomycin-polymyxin-dexamethasone (MAXITROL) ophthalmic suspension One drop 4 times daily both eyes, Normal      buPROPion (Wellbutrin XL) 150 mg 24 hr tablet Take 1 tablet (150 mg total) by mouth every morning, Starting Mon 3/18/2024, Normal      naltrexone (REVIA) 50 mg tablet Take 1/2 tablet by mouth daily in the morning for 2 weeks, then take 1/2 tablet twice a day., Normal           No discharge procedures on file.    PDMP Review         Value Time User    PDMP Reviewed  Yes 3/26/2020 10:05 AM Sumit Ramírez DO             ED Provider  Attending physically available and evaluated Ciera Maria. I managed the  patient along with the ED Attending.    Electronically Signed by           Jae Morrow DO  04/12/24 4032

## 2024-04-09 NOTE — ED ATTENDING ATTESTATION
4/9/2024  I, Greald Orourke MD, saw and evaluated the patient. I have discussed the patient with the resident/non-physician practitioner and agree with the resident's/non-physician practitioner's findings, Plan of Care, and MDM as documented in the resident's/non-physician practitioner's note, except where noted. All available labs and Radiology studies were reviewed.  I was present for key portions of any procedure(s) performed by the resident/non-physician practitioner and I was immediately available to provide assistance.       At this point I agree with the current assessment done in the Emergency Department.  I have conducted an independent evaluation of this patient a history and physical is as follows:  Shortly after taking new medications today for weight loss  Patient experienced several episodes of nonbilious nonbloody vomiting  No complaints of diarrhea urinating normally last bowel movement was this morning prior abdominal surgeries include cholecystectomy no fever no chills no travel history  Exam: Patient appears well-hydrated clinically nontoxic-appearing  Sclerae anicteric mucous membranes are moist  Lungs clear heart regular abdomen soft nontender  Impression   vomiting secondary to medication side effect  Zofran  P.o. challenge  Holding fluids without difficulty after Zofran  Careful return precautions  ED Course         Critical Care Time  Procedures

## 2024-04-10 ENCOUNTER — TELEPHONE (OUTPATIENT)
Dept: BARIATRICS | Facility: CLINIC | Age: 34
End: 2024-04-10

## 2024-05-28 ENCOUNTER — TELEPHONE (OUTPATIENT)
Dept: BARIATRICS | Facility: CLINIC | Age: 34
End: 2024-05-28

## 2024-06-25 ENCOUNTER — OFFICE VISIT (OUTPATIENT)
Dept: FAMILY MEDICINE CLINIC | Facility: CLINIC | Age: 34
End: 2024-06-25
Payer: COMMERCIAL

## 2024-06-25 DIAGNOSIS — M99.05 PELVIC SOMATIC DYSFUNCTION: ICD-10-CM

## 2024-06-25 DIAGNOSIS — M54.50 ACUTE BILATERAL LOW BACK PAIN WITHOUT SCIATICA: Primary | ICD-10-CM

## 2024-06-25 DIAGNOSIS — M99.04 SACRAL REGION SOMATIC DYSFUNCTION: ICD-10-CM

## 2024-06-25 DIAGNOSIS — S39.013A STRAIN OF MUSCLE, FASCIA AND TENDON OF PELVIS, INITIAL ENCOUNTER: ICD-10-CM

## 2024-06-25 DIAGNOSIS — S39.012A STRAIN, SACRAL, INITIAL ENCOUNTER: ICD-10-CM

## 2024-06-25 PROCEDURE — 99213 OFFICE O/P EST LOW 20 MIN: CPT | Performed by: FAMILY MEDICINE

## 2024-06-25 PROCEDURE — 98925 OSTEOPATH MANJ 1-2 REGIONS: CPT | Performed by: FAMILY MEDICINE

## 2024-07-08 NOTE — PROGRESS NOTES
Ambulatory Visit  Name: Ciera Maria      : 1990      MRN: [id number]  Encounter Provider: Sumit Ramírez DO  Encounter Date: 2024   Encounter department: Merged with [facility]    Assessment & Plan   1. Acute bilateral low back pain without sciatica  2. Pelvic somatic dysfunction  3. Strain of muscle, fascia and tendon of pelvis, initial encounter  4. Sacral region somatic dysfunction  5. Strain, sacral, initial encounter       History of Present Illness     LBP        Review of Systems   Musculoskeletal:  Positive for back pain.       Objective     There were no vitals taken for this visit.    Physical Exam  Musculoskeletal:      Comments: Prone: Inferior L psis, sacrum: Rotated R, SB left.     OMT    Performed by: Sumit Ramírez DO  Authorized by: Sumit Ramírez DO  Universal Protocol:  Consent: Verbal consent obtained.  Consent given by: patient      Procedure Details:     Region evaluated and treated:  Sacrum/Pelvis and Pelvis Innominate    Sacrum/Pelvis details:     Examination Method:  Tenderness, Pain and Asymmetry, Misalignment, Crepitation, Defects, Masses    Severity:  Mild    Treatment Method:  Muscle Energy Treatment    Response:  Resolved - The somatic dysfunction is completely resolved without evidence of it ever having been present.    Pelvis Innominate details:     Examination Method:  Asymmetry, Misalignment, Crepitation, Defects, Masses    Severity:  Mild    Treatment Method:  Muscle Energy Treatment    Response:  Resolved - The somatic dysfunction is completely resolved without evidence of it ever having been present.    Total Regions Treated:  2      Administrative Statements

## 2024-09-05 ENCOUNTER — APPOINTMENT (OUTPATIENT)
Dept: LAB | Facility: CLINIC | Age: 34
End: 2024-09-05

## 2024-09-05 DIAGNOSIS — Z00.8 HEALTH EXAMINATION IN POPULATION SURVEY: ICD-10-CM

## 2024-09-05 LAB
CHOLEST SERPL-MCNC: 123 MG/DL
EST. AVERAGE GLUCOSE BLD GHB EST-MCNC: 114 MG/DL
HBA1C MFR BLD: 5.6 %
HDLC SERPL-MCNC: 62 MG/DL
LDLC SERPL CALC-MCNC: 52 MG/DL (ref 0–100)
NONHDLC SERPL-MCNC: 61 MG/DL
TRIGL SERPL-MCNC: 46 MG/DL

## 2024-09-05 PROCEDURE — 36415 COLL VENOUS BLD VENIPUNCTURE: CPT

## 2024-09-05 PROCEDURE — 80061 LIPID PANEL: CPT

## 2024-09-05 PROCEDURE — 83036 HEMOGLOBIN GLYCOSYLATED A1C: CPT

## 2024-10-14 ENCOUNTER — APPOINTMENT (OUTPATIENT)
Dept: LAB | Facility: CLINIC | Age: 34
End: 2024-10-14
Payer: COMMERCIAL

## 2024-10-14 DIAGNOSIS — Z32.01 POSITIVE PREGNANCY TEST: Primary | ICD-10-CM

## 2024-10-14 LAB
B-HCG SERPL-ACNC: ABNORMAL MIU/ML (ref 0–5)
PROGEST SERPL-MCNC: 12.89 NG/ML

## 2024-10-14 PROCEDURE — 84702 CHORIONIC GONADOTROPIN TEST: CPT

## 2024-10-14 PROCEDURE — 84144 ASSAY OF PROGESTERONE: CPT

## 2024-10-14 PROCEDURE — 36415 COLL VENOUS BLD VENIPUNCTURE: CPT

## 2024-10-16 ENCOUNTER — APPOINTMENT (OUTPATIENT)
Dept: LAB | Facility: CLINIC | Age: 34
End: 2024-10-16
Payer: COMMERCIAL

## 2024-10-16 LAB — B-HCG SERPL-ACNC: ABNORMAL MIU/ML (ref 0–5)

## 2024-10-16 PROCEDURE — 84702 CHORIONIC GONADOTROPIN TEST: CPT

## 2024-10-16 PROCEDURE — 36415 COLL VENOUS BLD VENIPUNCTURE: CPT

## 2024-11-06 ENCOUNTER — ULTRASOUND (OUTPATIENT)
Dept: OBGYN CLINIC | Facility: MEDICAL CENTER | Age: 34
End: 2024-11-06
Payer: COMMERCIAL

## 2024-11-06 VITALS
WEIGHT: 212.2 LBS | BODY MASS INDEX: 36.23 KG/M2 | SYSTOLIC BLOOD PRESSURE: 122 MMHG | HEIGHT: 64 IN | DIASTOLIC BLOOD PRESSURE: 72 MMHG

## 2024-11-06 DIAGNOSIS — N91.2 AMENORRHEA: Primary | ICD-10-CM

## 2024-11-06 DIAGNOSIS — Z32.01 POSITIVE PREGNANCY TEST: ICD-10-CM

## 2024-11-06 PROCEDURE — 99214 OFFICE O/P EST MOD 30 MIN: CPT | Performed by: OBSTETRICS & GYNECOLOGY

## 2024-11-06 PROCEDURE — 76817 TRANSVAGINAL US OBSTETRIC: CPT | Performed by: OBSTETRICS & GYNECOLOGY

## 2024-11-06 NOTE — PROGRESS NOTES
"Pregnancy Confirmation Visit  [facility] [address], [place], [place]    Assessment/Plan:  34 y.o.  presenting with missed menses.  Viable pregnancy 8w0d by LMP consistent with ultrasound today.  - Continue/start prenatal vitamin  - Schedule prenatal nursing Intake in 2 weeks  - Initial prenatal visit in 4 weeks    Subjective:    CC: Missed period    Ciera Maria is a 34 y.o.  who presents with missed menses.  LMP Patient's last menstrual period was 2024..      Patient notes that this pregnancy was planned and desired.  She was not using contraception at the time of conception. She reports she is certain of her LMP and that she has regular menses. She has had vaginal bleeding 3 days ago associated with a BM.     Objective:  /72   Ht 5' 4\" (1.626 m)   Wt 96.3 kg (212 lb 3.2 oz)   LMP 2024   BMI 36.42 kg/m²     Physical Exam:  General: Well appearing, no distress  CV: Regular rate  Respiratory: Unlabored breathing  Abdomen: Soft, nontender  Extremities: Without edema  Mood and Affect: Appropriate    Transvaginal Pelvic Ultrasound  [place] IUP  Yolk sac: Present  Fetal Pole: Present  CRL consistent with EGA 8w4d  Cardiac activity: Present   bpm  No adnexal masses appreciated  "

## 2024-11-12 ENCOUNTER — TELEPHONE (OUTPATIENT)
Age: 34
End: 2024-11-12

## 2024-11-12 NOTE — TELEPHONE ENCOUNTER
Called patient and made her aware that I added her request for a flu shot on her Intake appointment, as per Freda.

## 2024-11-12 NOTE — TELEPHONE ENCOUNTER
Patient is coming in for an OB Intake on 11/18/2024, want to know if she can get the Flu shot at that appt. Warm transfer to office [facility], unsuccessful. Please call patient back to follow up.

## 2024-11-15 NOTE — PROGRESS NOTES
OB INTAKE INTERVIEW 2024    Patient is 34 y.o. who presents for OB intake at 9w5d.  She is not accompanied by anyone during this encounter.  The father of her baby (Ramos Maria) is involved in the pregnancy.      Patient's last menstrual period was 2024.  Ultrasound: Measured 8  weeks 4 days on 2024   Estimated Date of Delivery: 25 confirmed by dating ultrasound.     Signs/Symptoms of Pregnancy  Current pregnancy symptoms: breast tenderness, fatigue, nausea, frequent urination, and constipation  Headaches: no  Cramping: YES - very mild  Spotting: YES - in early pregnancy  PICA cravings: no    Diabetes:  Body mass index is 37.14 kg/m².  If patient has 1 or more, please order early 1 hour GTT  History of GDM: YES - A2GDM  BMI >35 YES - initial BMI 37  History of PCOS or current metformin use: no  History of LGA/macrosomic infant (4000g/9lbs): no    If patient has 2 or more, please order early 1 hour GTT  BMI>30 YES - initial BMI 37  AMA: no  First degree relative with type 2 diabetes: no  History of chronic HTN, hyperlipidemia, elevated A1C: no  High risk race (, , ,  or ): YES -     Hypertension: if you answer yes to any of the following, please order baseline preeclampsia labs (cbc, comprehensive metabolic panel, urine protein creatinine ratio, uric acid)  History of of chronic HTN: no  History of gestational HTN: YES  History of preeclampsia, eclampsia, or HELLP syndrome: YES - mild Pre-e in prior pregnancy  History of diabetes: no  History of lupus,sjogrens syndrome, kidney disease no    Thyroid: if yes order TSH with reflex T4  History of thyroid disease: no    Bleeding Disorder or Hx of DVT - patient or first degree relative with history of. Order the following if not done previously.   (Factor V, antithrombin III, prothrombin gene mutation, protein C and S Ag, lupus anticoagulant, anticardiolipin, beta-2  glycoprotein):   no    OB/GYN:  History of abnormal pap smear: no       Date of last pap smear: 2023  History of HPV: no  History of Herpes/HSV: no  History of other STI: (gonorrhea, chlamydia, trich) no  History of prior : YES x2  History of prior : no  History of  delivery prior to 36 weeks 6 days: no  History of blood transfusion: no  Ok for blood transfusion: YES    Substance screening-   History of tobacco use no  Currently using tobacco no  Substance Use Screen Level:  No Risk    MRSA Screening:   Does the pt have a hx of MRSA? no    Mental Health:  Hx of/or current dx of depression: no  Hx of/or current dx of anxiety: no  Medications: no   EPDS Screen:  Negative / score: 2    Immunizations:  Influenza vaccine given this season: YES requesting today  Discussed Tdap vaccine:  YES  Discussed COVID Vaccine:  YES - had in the past  History of Varicella or Vaccination had varicella vaccine    Genetic/MFM:  Do you or your partner have a history of any of the following in yourselves or first degree relatives?  Cystic fibrosis: no  Spinal muscular atrophy: no  Hemoglobinopathy/Sickle Cell/Thalassemia: no  Fragile X Intellectual Disability: no    Discussed Carrier Screening being completed once in a lifetime as a standard of care lab test. Place orders for Cystic Fibrosis Gene Test (LEG018) and Spinal Muscular Atrophy DNA (GXB6263).  Patient was informed that prior authorization needs to be completed for these tests and this may take 7-10 business days.  Patient does desire testing for Cystic Fibrosis and Spinal Muscular Atrophy.    ACOG Patient Education Cystic Fibrosis and Spinal Muscular Atrophy prenatal screening given.    Appointment for Nuchal Translucency Ultrasound at [facility] is scheduled for 24.    Interview education:  [facility] Pregnancy Essentials Book reviewed, discussed and attached to their AVS: YES     Nurse/Family Partnership-patient may qualify NO; referral placed NO      Prenatal lab work scripts: YES    Extra labs ordered: Cystic Fibrosis gene test, Spinal muscular atrophy DNA, Hgb Fractionation Cascade, 1 hour GTT, CMP, Protein/creatinine ratio urine, and Uric Acid    Aspirin/Preeclampsia Screen    Risk Level Risk Factor Recommendation   LOW Prior Uncomplicated full-term delivery no - mild pre-e No Aspirin recommendation        MODERATE Nulliparity no Recommend low-dose aspirin if     BMI>30 YES - initial BMI 37 2 or more moderate risk factors    Family History Preeclampsia (mother/sister) no     35yr old or greater no     Black Race, Concern for SDOH/Low Socioeconomic no     IVF Pregnancy  no     Personal History Risks (low birth weight, prior adverse preg outcome, >10yr preg interval) no         HIGH History of Preeclampsia YES Recommend low-dose aspirin if     Multifetal gestation no 1 or more high risk factors    Chronic HTN no     Type 1 or 2 Diabetes no     Renal Disease no     Autoimmune Disease  no      Contraindications to ASA therapy:  NSAID/ ASA allergy: no  Nasal polyps: no  Asthma with history of ASA induced bronchospasm: no    Relative contraindications:  History of GI bleed: no  Active peptic ulcer disease: no  Severe hepatic dysfunction: no    Patient does meet recommendation to take ASA 162mg during this pregnancy from 12-36 wks to lower her risk of preeclampsia.  Instructions given and patient verbalized understanding.    The patient has a history now or in prior pregnancy notable for:  Obesity, hx Asthma, hx Fe Deficiency hx anemia, hx elevated A1C, hx A2GDM, hx PPH, hx Pre-e in prior pregnancy        Details that I feel the provider should be aware of: Ciera was seen here in office for her OB Intake visit, Hx obtained, Offered no c/o at present. Reviewed medications safe to take in pregnancy. [facility] Essentials packet/link reviewed. [facility] Baby and Me classes reviewed and how to register for classes, at pt request flu vaccine given today, pt tolerated well.  Additional labs for prior hx ordered - 1 hour GTT, pre-e baseline    PN1 visit scheduled. The patient was oriented to our practice, the navigator role, reviewed delivering physicians and [facility] for delivery. All questions were answered.    Interviewed by: Bethayn Albert RN

## 2024-11-15 NOTE — PATIENT INSTRUCTIONS
Congratulations on your pregnancy!  We thank you for allowing us to participate in your care.    NEXT STEPS    Go to the lab to have your prenatal bloodwork competed if you have not already done so.  There is a listing of [facility] Laboratories and locations in your prenatal folder. You may also visit [email or web address] or call [phone].   Please be aware that some insurance companies may require you to go to a specific lab (ex. [facility] or [facility]). You can verify this by contacting your insurance company.   If you have decided to be screened for CF and SMA genetic testing, these tests require prior authorization and scheduling.  Prior Authorization is not a guarantee of payment. There may be out of pocket expenses that includes copays, deductibles and or coinsurance for your individual plan.  Please call [phone] if our team has not contacted you in 7 business days.  Please have your blood work completed prior to you next prenatal visit.    If you have decided to have genetic testing done at Maternal Fetal Medicine, that will be scheduled by [facility]. You may have already scheduled this appointment.  If not, please call their office to schedule this appointment.  Based on the referral placed by our office, they will know how to schedule you appropriately.    Contact information for Maternal Fetal Medicine is located in your prenatal folder. The main phone number to their office is [phone].    Return to our office for your first routine prenatal visit.   [facility] has multiple locations. Always check the address of your appointment to ensure you are going to the correct office.       Warning Signs During Pregnancy - If you experience any problems or concerns, call the office directly.  The list below includes warning signs your providers would like you to be aware of.  If you experience any of these at any time during your pregnancy, please call us as soon as possible.   Vaginal bleeding  Sharp abdominal  pain that does not go away  Fever (more than 100.4?F and is not relieved with Tylenol)  Persistent vomiting lasting greater than 24 hours  Chest pain/Shortness of breath  Pain or burning when you urinate     Call the OFFICE [phone] for any questions/emergencies.  At night or on the weekend, calls go through a triage service, please indicate it is an emergency and the DOCTOR on call will be paged.    Remember to only use MyChart for non-urgent concerns or questions.    Our doctors deliver at [facility] in [place]. The address is provided below.     [address] [address]    Please click on the link below to review our Pregnancy Essential Guide.    [facility] Pregnancy Essentials Guide  [facility] Women's Health ([email or web address])     To learn more about the Prenatal Education classes that [facility] offers, click the link below.  Prenatal Education Classes    Click on the link below to review [facility] Lab locations.  [facility] Lab Locations    [facility] resource  [employer] is a tool to connect you to community resources you may need.      Thank you,   Bethany MERCADO, RN  OB Nurse Navigator

## 2024-11-18 ENCOUNTER — INITIAL PRENATAL (OUTPATIENT)
Dept: OBGYN CLINIC | Facility: MEDICAL CENTER | Age: 34
End: 2024-11-18
Payer: COMMERCIAL

## 2024-11-18 VITALS
WEIGHT: 216.4 LBS | HEIGHT: 64 IN | SYSTOLIC BLOOD PRESSURE: 104 MMHG | DIASTOLIC BLOOD PRESSURE: 60 MMHG | BODY MASS INDEX: 36.95 KG/M2

## 2024-11-18 DIAGNOSIS — Z34.81 MULTIGRAVIDA IN FIRST TRIMESTER: Primary | ICD-10-CM

## 2024-11-18 DIAGNOSIS — O99.210 OBESITY IN PREGNANCY: ICD-10-CM

## 2024-11-18 DIAGNOSIS — Z86.32 HISTORY OF GESTATIONAL DIABETES: ICD-10-CM

## 2024-11-18 DIAGNOSIS — Z23 NEED FOR INFLUENZA VACCINATION: ICD-10-CM

## 2024-11-18 DIAGNOSIS — Z36.9 ENCOUNTER FOR ANTENATAL SCREENING: ICD-10-CM

## 2024-11-18 DIAGNOSIS — Z31.430 ENCOUNTER OF FEMALE FOR TESTING FOR GENETIC DISEASE CARRIER STATUS FOR PROCREATIVE MANAGEMENT: ICD-10-CM

## 2024-11-18 DIAGNOSIS — Z87.59 HISTORY OF PRE-ECLAMPSIA: ICD-10-CM

## 2024-11-18 PROCEDURE — OBC

## 2024-11-18 PROCEDURE — 90656 IIV3 VACC NO PRSV 0.5 ML IM: CPT

## 2024-11-18 PROCEDURE — 90471 IMMUNIZATION ADMIN: CPT

## 2024-11-18 RX ORDER — DOCUSATE SODIUM 100 MG/1
100 CAPSULE, LIQUID FILLED ORAL 2 TIMES DAILY
COMMUNITY

## 2024-12-03 ENCOUNTER — APPOINTMENT (OUTPATIENT)
Dept: LAB | Facility: HOSPITAL | Age: 34
End: 2024-12-03
Payer: COMMERCIAL

## 2024-12-03 ENCOUNTER — APPOINTMENT (OUTPATIENT)
Dept: LAB | Facility: HOSPITAL | Age: 34
End: 2024-12-03
Attending: OBSTETRICS & GYNECOLOGY
Payer: COMMERCIAL

## 2024-12-03 DIAGNOSIS — O99.210 OBESITY IN PREGNANCY: ICD-10-CM

## 2024-12-03 DIAGNOSIS — Z36.9 ENCOUNTER FOR ANTENATAL SCREENING: ICD-10-CM

## 2024-12-03 DIAGNOSIS — Z87.59 HISTORY OF PRE-ECLAMPSIA: ICD-10-CM

## 2024-12-03 DIAGNOSIS — Z86.32 HISTORY OF GESTATIONAL DIABETES: ICD-10-CM

## 2024-12-03 DIAGNOSIS — Z31.430 ENCOUNTER OF FEMALE FOR TESTING FOR GENETIC DISEASE CARRIER STATUS FOR PROCREATIVE MANAGEMENT: ICD-10-CM

## 2024-12-03 DIAGNOSIS — Z34.81 MULTIGRAVIDA IN FIRST TRIMESTER: ICD-10-CM

## 2024-12-03 LAB
ABO GROUP BLD: NORMAL
ALBUMIN SERPL BCG-MCNC: 3.8 G/DL (ref 3.5–5)
ALP SERPL-CCNC: 48 U/L (ref 34–104)
ALT SERPL W P-5'-P-CCNC: 10 U/L (ref 7–52)
ANION GAP SERPL CALCULATED.3IONS-SCNC: 5 MMOL/L (ref 4–13)
AST SERPL W P-5'-P-CCNC: 11 U/L (ref 13–39)
BACTERIA UR QL AUTO: ABNORMAL /HPF
BASOPHILS # BLD AUTO: 0.04 THOUSANDS/ΜL (ref 0–0.1)
BASOPHILS NFR BLD AUTO: 0 % (ref 0–1)
BILIRUB SERPL-MCNC: 0.47 MG/DL (ref 0.2–1)
BILIRUB UR QL STRIP: NEGATIVE
BLD GP AB SCN SERPL QL: NEGATIVE
BUN SERPL-MCNC: 9 MG/DL (ref 5–25)
CALCIUM SERPL-MCNC: 9.3 MG/DL (ref 8.4–10.2)
CHLORIDE SERPL-SCNC: 102 MMOL/L (ref 96–108)
CLARITY UR: CLEAR
CO2 SERPL-SCNC: 26 MMOL/L (ref 21–32)
COLOR UR: YELLOW
CREAT SERPL-MCNC: 0.48 MG/DL (ref 0.6–1.3)
CREAT UR-MCNC: 156.3 MG/DL
EOSINOPHIL # BLD AUTO: 0.26 THOUSAND/ΜL (ref 0–0.61)
EOSINOPHIL NFR BLD AUTO: 3 % (ref 0–6)
ERYTHROCYTE [DISTWIDTH] IN BLOOD BY AUTOMATED COUNT: 13.1 % (ref 11.6–15.1)
GFR SERPL CREATININE-BSD FRML MDRD: 129 ML/MIN/1.73SQ M
GLUCOSE 1H P 50 G GLC PO SERPL-MCNC: 141 MG/DL (ref 70–134)
GLUCOSE SERPL-MCNC: 139 MG/DL (ref 65–140)
GLUCOSE UR STRIP-MCNC: ABNORMAL MG/DL
HBV SURFACE AB SER-ACNC: 12.3 MIU/ML
HBV SURFACE AG SER QL: NORMAL
HCT VFR BLD AUTO: 37.8 % (ref 34.8–46.1)
HCV AB SER QL: NORMAL
HGB BLD-MCNC: 12.6 G/DL (ref 11.5–15.4)
HGB UR QL STRIP.AUTO: NEGATIVE
HIV 1+2 AB+HIV1 P24 AG SERPL QL IA: NORMAL
HIV 2 AB SERPL QL IA: NORMAL
HIV1 AB SERPL QL IA: NORMAL
HIV1 P24 AG SERPL QL IA: NORMAL
IMM GRANULOCYTES # BLD AUTO: 0.06 THOUSAND/UL (ref 0–0.2)
IMM GRANULOCYTES NFR BLD AUTO: 1 % (ref 0–2)
KETONES UR STRIP-MCNC: NEGATIVE MG/DL
LEUKOCYTE ESTERASE UR QL STRIP: ABNORMAL
LYMPHOCYTES # BLD AUTO: 1.59 THOUSANDS/ΜL (ref 0.6–4.47)
LYMPHOCYTES NFR BLD AUTO: 17 % (ref 14–44)
MCH RBC QN AUTO: 30 PG (ref 26.8–34.3)
MCHC RBC AUTO-ENTMCNC: 33.3 G/DL (ref 31.4–37.4)
MCV RBC AUTO: 90 FL (ref 82–98)
MONOCYTES # BLD AUTO: 0.44 THOUSAND/ΜL (ref 0.17–1.22)
MONOCYTES NFR BLD AUTO: 5 % (ref 4–12)
MUCOUS THREADS UR QL AUTO: ABNORMAL
NEUTROPHILS # BLD AUTO: 6.83 THOUSANDS/ΜL (ref 1.85–7.62)
NEUTS SEG NFR BLD AUTO: 74 % (ref 43–75)
NITRITE UR QL STRIP: NEGATIVE
NON-SQ EPI CELLS URNS QL MICRO: ABNORMAL /HPF
NRBC BLD AUTO-RTO: 0 /100 WBCS
PH UR STRIP.AUTO: 6.5 [PH]
PLATELET # BLD AUTO: 252 THOUSANDS/UL (ref 149–390)
PMV BLD AUTO: 9.6 FL (ref 8.9–12.7)
POTASSIUM SERPL-SCNC: 3.1 MMOL/L (ref 3.5–5.3)
PROT SERPL-MCNC: 7.2 G/DL (ref 6.4–8.4)
PROT UR STRIP-MCNC: ABNORMAL MG/DL
PROT UR-MCNC: 15.3 MG/DL
PROT/CREAT UR: 0.1 MG/G{CREAT} (ref 0–0.1)
RBC # BLD AUTO: 4.2 MILLION/UL (ref 3.81–5.12)
RBC #/AREA URNS AUTO: ABNORMAL /HPF
RH BLD: POSITIVE
RUBV IGG SERPL IA-ACNC: 48 IU/ML
SODIUM SERPL-SCNC: 133 MMOL/L (ref 135–147)
SP GR UR STRIP.AUTO: 1.03 (ref 1–1.03)
SPECIMEN EXPIRATION DATE: NORMAL
TREPONEMA PALLIDUM IGG+IGM AB [PRESENCE] IN SERUM OR PLASMA BY IMMUNOASSAY: NORMAL
URATE SERPL-MCNC: 4.1 MG/DL (ref 2–7.5)
UROBILINOGEN UR STRIP-ACNC: <2 MG/DL
WBC # BLD AUTO: 9.22 THOUSAND/UL (ref 4.31–10.16)
WBC #/AREA URNS AUTO: ABNORMAL /HPF

## 2024-12-03 PROCEDURE — 86762 RUBELLA ANTIBODY: CPT

## 2024-12-03 PROCEDURE — 87086 URINE CULTURE/COLONY COUNT: CPT

## 2024-12-03 PROCEDURE — 87340 HEPATITIS B SURFACE AG IA: CPT

## 2024-12-03 PROCEDURE — 83020 HEMOGLOBIN ELECTROPHORESIS: CPT

## 2024-12-03 PROCEDURE — 82950 GLUCOSE TEST: CPT

## 2024-12-03 PROCEDURE — 86803 HEPATITIS C AB TEST: CPT

## 2024-12-03 PROCEDURE — 85025 COMPLETE CBC W/AUTO DIFF WBC: CPT

## 2024-12-03 PROCEDURE — 36415 COLL VENOUS BLD VENIPUNCTURE: CPT

## 2024-12-03 PROCEDURE — 87389 HIV-1 AG W/HIV-1&-2 AB AG IA: CPT

## 2024-12-03 PROCEDURE — 84550 ASSAY OF BLOOD/URIC ACID: CPT

## 2024-12-03 PROCEDURE — 86706 HEP B SURFACE ANTIBODY: CPT

## 2024-12-03 PROCEDURE — 87077 CULTURE AEROBIC IDENTIFY: CPT

## 2024-12-03 PROCEDURE — 80053 COMPREHEN METABOLIC PANEL: CPT

## 2024-12-03 PROCEDURE — 86901 BLOOD TYPING SEROLOGIC RH(D): CPT

## 2024-12-03 PROCEDURE — 84156 ASSAY OF PROTEIN URINE: CPT

## 2024-12-03 PROCEDURE — 81001 URINALYSIS AUTO W/SCOPE: CPT

## 2024-12-03 PROCEDURE — 86780 TREPONEMA PALLIDUM: CPT

## 2024-12-03 PROCEDURE — 86900 BLOOD TYPING SEROLOGIC ABO: CPT

## 2024-12-03 PROCEDURE — 81329 SMN1 GENE DOS/DELETION ALYS: CPT

## 2024-12-03 PROCEDURE — 82570 ASSAY OF URINE CREATININE: CPT

## 2024-12-03 PROCEDURE — 86850 RBC ANTIBODY SCREEN: CPT

## 2024-12-03 PROCEDURE — 81220 CFTR GENE COM VARIANTS: CPT

## 2024-12-04 ENCOUNTER — TELEPHONE (OUTPATIENT)
Age: 34
End: 2024-12-04

## 2024-12-04 LAB — BACTERIA UR CULT: ABNORMAL

## 2024-12-05 ENCOUNTER — NURSE TRIAGE (OUTPATIENT)
Age: 34
End: 2024-12-05

## 2024-12-05 DIAGNOSIS — Z36.9 ENCOUNTER FOR ANTENATAL SCREENING: Primary | ICD-10-CM

## 2024-12-05 DIAGNOSIS — R82.71 GROUP B STREPTOCOCCAL BACTERIURIA: ICD-10-CM

## 2024-12-05 LAB
HGB A MFR BLD: 2.3 % (ref 1.8–3.2)
HGB A MFR BLD: 97.7 % (ref 96.4–98.8)
HGB F MFR BLD: 0 % (ref 0–2)
HGB FRACT BLD-IMP: NORMAL
HGB S MFR BLD: 0 %

## 2024-12-05 NOTE — TELEPHONE ENCOUNTER
"12w1d [facility] 6/18/24 patient states she sees her UA labs and asking for provider to review. Patient denies burning on urination, no odor, no bleeding, no cramping, no fever. Patient does report urinary frequency with pregnancy and she is increasing PO fluids. Patient also states she gets frequent yeast infections- started Monday with white thick clumpy vaginal discharge and itching. Patient will start Monistat 7 tonight.   [facility] sent to lab ordering provider, per patient request, to review urine results.  Answer Assessment - Initial Assessment Questions  1. TEST: \"What kind of urine test was performed?\" (e.g., urinalysis, urine dipstick)       Routine urine   2. URINALYSIS RESULT: \"Was it positive or negative?\"  If positive, document what was positive (e.g., LE, nitrites, WBC, RBC, bacteria, epithelial cells)      Yes, strep  3. FEVER: \"Do you have a fever?\" If Yes, ask: \"What is your temperature, how was it measured, and when did it start?\"      denies  4. FLANK PAIN: \"Do you have any pain in your side?\"      denies  5. OTHER SYMPTOMS: \"Do you have any other symptoms?\" (e.g., blood in urine, vomiting)     Has some vaginal discharge and itching, feels like [place]   6. PREGNANCY: \"Is there any chance you are pregnant?\" \"When was your last menstrual period?\"      12w1d   7. PHENAZOPYRIDINE (Uristat, Pyridium): \"Have you taken phenazopyridine (turns your urine orange) recently?\"      no    Protocols used: Urinalysis Results Follow-Up Call-Adult-[place]    "

## 2024-12-06 NOTE — TELEPHONE ENCOUNTER
Per Dr. Shine, recommends repeat ua culture. Spoke with Ciera, she is not having specific uti symptoms and plans to start monistat over weekend.  She will repeat culture in AM prior to monistat use. Aware to call if develops urinary frequency, painful urination or additonal urinary concerns.

## 2024-12-09 ENCOUNTER — ROUTINE PRENATAL (OUTPATIENT)
Dept: PERINATAL CARE | Facility: CLINIC | Age: 34
End: 2024-12-09
Payer: COMMERCIAL

## 2024-12-09 VITALS
SYSTOLIC BLOOD PRESSURE: 112 MMHG | BODY MASS INDEX: 37.15 KG/M2 | HEART RATE: 97 BPM | WEIGHT: 217.6 LBS | DIASTOLIC BLOOD PRESSURE: 80 MMHG | HEIGHT: 64 IN

## 2024-12-09 DIAGNOSIS — O24.410 DIET CONTROLLED GESTATIONAL DIABETES MELLITUS (GDM) IN FIRST TRIMESTER: ICD-10-CM

## 2024-12-09 DIAGNOSIS — R73.09 ELEVATED GLUCOSE TOLERANCE TEST: ICD-10-CM

## 2024-12-09 DIAGNOSIS — E66.812 CLASS II OBESITY: ICD-10-CM

## 2024-12-09 DIAGNOSIS — Z36.0 ENCOUNTER FOR ANTENATAL SCREENING FOR CHROMOSOMAL ANOMALIES: ICD-10-CM

## 2024-12-09 DIAGNOSIS — O09.891 PREVIOUS PREGNANCY COMPLICATED BY PREGNANCY-INDUCED HYPERTENSION, ANTEPARTUM, FIRST TRIMESTER: ICD-10-CM

## 2024-12-09 DIAGNOSIS — Z3A.12 12 WEEKS GESTATION OF PREGNANCY: Primary | ICD-10-CM

## 2024-12-09 DIAGNOSIS — Z36.82 NUCHAL TRANSLUCENCY OF FETUS ON PRENATAL ULTRASOUND: ICD-10-CM

## 2024-12-09 LAB
CITATION REF LAB TEST: NORMAL
CLINICAL INFO: NORMAL
ETHNIC BACKGROUND STATED: NORMAL
GENE DIS ANL CARRIER INTERP-IMP: NORMAL
GENE MUT TESTED BLD/T: NORMAL
LAB DIRECTOR NAME PROVIDER: NORMAL
REASON FOR REFERRAL (NARRATIVE): NORMAL
RECOMMENDATION PATIENT DOC-IMP: NORMAL
REF LAB TEST METHOD: NORMAL
SERVICE CMNT-IMP: NORMAL
SMN1 GENE MUT ANL BLD/T: NORMAL
SPECIMEN SOURCE: NORMAL

## 2024-12-09 PROCEDURE — 76813 OB US NUCHAL MEAS 1 GEST: CPT | Performed by: OBSTETRICS & GYNECOLOGY

## 2024-12-09 PROCEDURE — 99244 OFF/OP CNSLTJ NEW/EST MOD 40: CPT | Performed by: OBSTETRICS & GYNECOLOGY

## 2024-12-09 PROCEDURE — 76801 OB US < 14 WKS SINGLE FETUS: CPT | Performed by: OBSTETRICS & GYNECOLOGY

## 2024-12-09 NOTE — PROGRESS NOTES
A fetal ultrasound was completed. See Ob procedures in Epic for an interpretation and recommendations. Do not hesitate to contact us in [facility] if you have questions.    Karl Winston MD, MSCE  Maternal Fetal Medicine

## 2024-12-09 NOTE — LETTER
December 9, 2024     Katelyn Shine MD  [address] [place] [address]  [address] [place] [address]    Patient: Ciera Maria   YOB: 1990   Date of Visit: 12/9/2024       Dear Dr. Shine:    Thank you for referring Ciera Maria to me for evaluation. Below are my notes for this consultation.    If you have questions, please do not hesitate to call me. I look forward to following your patient along with you.         Sincerely,        Karl Winston MD        CC: GUILLERMO Edwards MD  12/9/2024  1:16 PM  Sign when Signing Visit  A fetal ultrasound was completed. See Ob procedures in Epic for an interpretation and recommendations. Do not hesitate to contact us in [facility] if you have questions.    Karl Winston MD, MSCE  Maternal Fetal Medicine

## 2024-12-09 NOTE — PROGRESS NOTES
Patient refused NIPT testing at this time in order to speak with her insurance provider about cost. Patient understands to call [phone] to schedule a nurse visit to get bloodwork drawn in office or to  a kit from one of our [facility] offices to have drawn at a [facility]'s lab.

## 2024-12-18 ENCOUNTER — TELEPHONE (OUTPATIENT)
Dept: PERINATAL CARE | Facility: OTHER | Age: 34
End: 2024-12-18

## 2024-12-18 NOTE — TELEPHONE ENCOUNTER
Ernestine 12/18 @ 9:36 am for patient to call the office to schedule a nurse visit to have NIPS blood work done. Stated to patient that she would no longer need the 1/2/25 Genetic Counseling class appointment due to her having the blood work, I was going to go ahead and  cancel that appointment.

## 2024-12-19 ENCOUNTER — OFFICE VISIT (OUTPATIENT)
Dept: PERINATAL CARE | Facility: CLINIC | Age: 34
End: 2024-12-19
Payer: COMMERCIAL

## 2024-12-19 ENCOUNTER — INITIAL PRENATAL (OUTPATIENT)
Dept: OBGYN CLINIC | Facility: MEDICAL CENTER | Age: 34
End: 2024-12-19

## 2024-12-19 ENCOUNTER — RESULTS FOLLOW-UP (OUTPATIENT)
Dept: OBGYN CLINIC | Facility: CLINIC | Age: 34
End: 2024-12-19

## 2024-12-19 VITALS — SYSTOLIC BLOOD PRESSURE: 124 MMHG | BODY MASS INDEX: 37.59 KG/M2 | DIASTOLIC BLOOD PRESSURE: 68 MMHG | WEIGHT: 219 LBS

## 2024-12-19 DIAGNOSIS — R00.2 HEART PALPITATIONS: ICD-10-CM

## 2024-12-19 DIAGNOSIS — Z3A.14 14 WEEKS GESTATION OF PREGNANCY: Primary | ICD-10-CM

## 2024-12-19 DIAGNOSIS — Z36.0 ENCOUNTER FOR ANTENATAL SCREENING FOR CHROMOSOMAL ANOMALIES: Primary | ICD-10-CM

## 2024-12-19 DIAGNOSIS — Z34.91 FIRST TRIMESTER PREGNANCY: ICD-10-CM

## 2024-12-19 DIAGNOSIS — R73.09 ELEVATED GLUCOSE TOLERANCE TEST: ICD-10-CM

## 2024-12-19 PROCEDURE — PNV: Performed by: OBSTETRICS & GYNECOLOGY

## 2024-12-19 PROCEDURE — 87591 N.GONORRHOEAE DNA AMP PROB: CPT | Performed by: OBSTETRICS & GYNECOLOGY

## 2024-12-19 PROCEDURE — 36415 COLL VENOUS BLD VENIPUNCTURE: CPT | Performed by: NURSE PRACTITIONER

## 2024-12-19 PROCEDURE — 87491 CHLMYD TRACH DNA AMP PROBE: CPT | Performed by: OBSTETRICS & GYNECOLOGY

## 2024-12-19 RX ORDER — ASPIRIN 81 MG/1
81 TABLET, CHEWABLE ORAL DAILY
COMMUNITY

## 2024-12-19 NOTE — PROGRESS NOTES
Ciera is a 34 y.o. year old  at 14w1d for first prenatal visit.   Pregnancy was desired. She is currently taking PNV    Nausea No Vomiting No   Exam done today - see OB flowsheet  Pap done No  Gonorrhea and Chlamydia sent  Labs reviewed    Genetic testing - NIPT done .  Will oder AFP next visit      OB complications   Hx of preeclampsia  - normal baseline labs . On  ASA  Hx of  A2GDM - elevated early 1 hr gtt  ,  order for  3 hr gtt and  HgbA1c given   Palpitations - RRR on exam , TSH ordered and  Holter ordered         Pt has been counseled re diet, exercise, weight gain, foods to avoid, vaccines in pregnancy, trisomy screening, travel precautions to include seat belt use and VTE risk reduction.  She has been provided our pregnancy packet which includes how and when to contact providers, medication recommendations, dietary suggestions, breastfeeding information as well as websites for additional information, hospital and delivery concerns.

## 2024-12-19 NOTE — PROGRESS NOTES
Patient chose to have [facility] [id number] Non-Invasive Prenatal Screen [id number] PLUS w/ SCA, WITH fetal sex.  Patient choose to be billed through insurance.     Patient given brochure and is aware [facility] will contact patient's insurance and coordinate coverage.  Provided [facility] contact information. General inquiries [phone], Cost estimates [phone] and [facility] Billing [phone]. Website [employer].     Blood collection tubes labeled with patient identifiers (name, medical record number, and date of birth).     Filled out [facility] order form. Patient chose to have blood drawn in our office at time of visit. NIPS was drawn from left arm with a straight needle by CHICO Watts RNC-OB . .      Maternal Fetal Medicine will have results in approximately 5-7 business days and will call patient or notify via e[employer]ureau.  Patient aware viewing lab result online will reveal fetal sex if ordered.    Patient verbalized understanding of all instructions and no questions at this time.

## 2024-12-20 LAB
C TRACH DNA SPEC QL NAA+PROBE: NEGATIVE
N GONORRHOEA DNA SPEC QL NAA+PROBE: NEGATIVE

## 2024-12-25 LAB
CFDNA.FET/CFDNA.TOTAL SFR FETUS: NORMAL %
CITATION REF LAB TEST: NORMAL
FET 13+18+21+X+Y ANEUP PLAS.CFDNA: NEGATIVE
FET CHR 21 TS PLAS.CFDNA QL: NEGATIVE
FET CHR 21 TS PLAS.CFDNA QL: NEGATIVE
FET MS X RISK WBC.DNA+CFDNA QL: NOT DETECTED
FET SEX PLAS.CFDNA DOSAGE CFDNA: NORMAL
FET TS 13 RISK PLAS.CFDNA QL: NEGATIVE
FET X + Y ANEUP RISK PLAS.CFDNA SEQ-IMP: NOT DETECTED
GA EST FROM CONCEPTION DATE: NORMAL D
GESTATIONAL AGE > 9:: YES
LAB DIRECTOR NAME PROVIDER: NORMAL
LAB DIRECTOR NAME PROVIDER: NORMAL
LABORATORY COMMENT REPORT: NORMAL
LIMITATIONS OF THE TEST: NORMAL
NEGATIVE PREDICTIVE VALUE: NORMAL
PERFORMANCE CHARACTERISTICS: NORMAL
POSITIVE PREDICTIVE VALUE: NORMAL
REF LAB TEST METHOD: NORMAL
SL AMB NOTE:: NORMAL
TEST PERFORMANCE INFO SPEC: NORMAL

## 2024-12-30 ENCOUNTER — RESULTS FOLLOW-UP (OUTPATIENT)
Dept: PERINATAL CARE | Facility: OTHER | Age: 34
End: 2024-12-30

## 2024-12-31 LAB
CFTR FULL MUT ANL BLD/T SEQ: NORMAL
CITATION REF LAB TEST: NORMAL
CLINICAL INFO: NORMAL
ETHNIC BACKGROUND STATED: NORMAL
GENE DIS ANL CARRIER INTERP-IMP: NORMAL
INDICATION: NORMAL
LAB DIRECTOR NAME PROVIDER: NORMAL
RECOMMENDATION PATIENT DOC-IMP: NORMAL
REF LAB TEST METHOD: NORMAL
SERVICE CMNT-IMP: NORMAL
SPECIMEN SOURCE: NORMAL

## 2025-01-10 ENCOUNTER — HOSPITAL ENCOUNTER (OUTPATIENT)
Facility: HOSPITAL | Age: 35
Setting detail: OBSERVATION
Discharge: HOME | End: 2025-01-11
Attending: OBSTETRICS & GYNECOLOGY | Admitting: OBSTETRICS & GYNECOLOGY
Payer: COMMERCIAL

## 2025-01-10 ENCOUNTER — APPOINTMENT (OUTPATIENT)
Dept: CT IMAGING | Facility: HOSPITAL | Age: 35
End: 2025-01-10
Payer: COMMERCIAL

## 2025-01-10 ENCOUNTER — NURSE TRIAGE (OUTPATIENT)
Age: 35
End: 2025-01-10

## 2025-01-10 ENCOUNTER — HOSPITAL ENCOUNTER (OUTPATIENT)
Facility: HOSPITAL | Age: 35
Discharge: HOME | End: 2025-01-10
Attending: OBSTETRICS & GYNECOLOGY | Admitting: OBSTETRICS & GYNECOLOGY
Payer: COMMERCIAL

## 2025-01-10 VITALS
DIASTOLIC BLOOD PRESSURE: 74 MMHG | HEART RATE: 106 BPM | TEMPERATURE: 99.3 F | SYSTOLIC BLOOD PRESSURE: 127 MMHG | WEIGHT: 219 LBS | RESPIRATION RATE: 18 BRPM | OXYGEN SATURATION: 99 % | HEIGHT: 64 IN | BODY MASS INDEX: 37.39 KG/M2

## 2025-01-10 DIAGNOSIS — O26.891 ABDOMINAL PAIN DURING PREGNANCY IN FIRST TRIMESTER: Primary | ICD-10-CM

## 2025-01-10 DIAGNOSIS — R10.9 ABDOMINAL PAIN DURING PREGNANCY IN FIRST TRIMESTER: Primary | ICD-10-CM

## 2025-01-10 DIAGNOSIS — K52.9 ENTERITIS: ICD-10-CM

## 2025-01-10 PROBLEM — R39.15 URINARY URGENCY: Status: ACTIVE | Noted: 2025-01-10

## 2025-01-10 LAB
ABO GROUP BLD: NORMAL
ALBUMIN SERPL BCG-MCNC: 3.4 G/DL (ref 3.5–5)
ALBUMIN SERPL BCG-MCNC: 3.8 G/DL (ref 3.5–5)
ALP SERPL-CCNC: 48 U/L (ref 34–104)
ALP SERPL-CCNC: 52 U/L (ref 34–104)
ALT SERPL W P-5'-P-CCNC: 12 U/L (ref 7–52)
ALT SERPL W P-5'-P-CCNC: 14 U/L (ref 7–52)
ANION GAP SERPL CALCULATED.3IONS-SCNC: 7 MMOL/L (ref 4–13)
ANION GAP SERPL CALCULATED.3IONS-SCNC: 9 MMOL/L (ref 4–13)
AST SERPL W P-5'-P-CCNC: 12 U/L (ref 13–39)
AST SERPL W P-5'-P-CCNC: 13 U/L (ref 13–39)
BACTERIA UR QL AUTO: ABNORMAL /HPF
BASOPHILS # BLD AUTO: 0.03 THOUSANDS/ΜL (ref 0–0.1)
BASOPHILS NFR BLD AUTO: 0 % (ref 0–1)
BILIRUB SERPL-MCNC: 0.58 MG/DL (ref 0.2–1)
BILIRUB SERPL-MCNC: 0.71 MG/DL (ref 0.2–1)
BILIRUB UR QL STRIP: NEGATIVE
BLD GP AB SCN SERPL QL: NEGATIVE
BUN SERPL-MCNC: 6 MG/DL (ref 5–25)
BUN SERPL-MCNC: 6 MG/DL (ref 5–25)
CALCIUM ALBUM COR SERPL-MCNC: 9.3 MG/DL (ref 8.3–10.1)
CALCIUM SERPL-MCNC: 8.8 MG/DL (ref 8.4–10.2)
CALCIUM SERPL-MCNC: 9 MG/DL (ref 8.4–10.2)
CHLORIDE SERPL-SCNC: 101 MMOL/L (ref 96–108)
CHLORIDE SERPL-SCNC: 103 MMOL/L (ref 96–108)
CLARITY UR: ABNORMAL
CO2 SERPL-SCNC: 22 MMOL/L (ref 21–32)
CO2 SERPL-SCNC: 22 MMOL/L (ref 21–32)
COLOR UR: YELLOW
CREAT SERPL-MCNC: 0.46 MG/DL (ref 0.6–1.3)
CREAT SERPL-MCNC: 0.49 MG/DL (ref 0.6–1.3)
EOSINOPHIL # BLD AUTO: 0.05 THOUSAND/ΜL (ref 0–0.61)
EOSINOPHIL NFR BLD AUTO: 0 % (ref 0–6)
ERYTHROCYTE [DISTWIDTH] IN BLOOD BY AUTOMATED COUNT: 13.1 % (ref 11.6–15.1)
ERYTHROCYTE [DISTWIDTH] IN BLOOD BY AUTOMATED COUNT: 13.2 % (ref 11.6–15.1)
FLUAV RNA RESP QL NAA+PROBE: NEGATIVE
FLUBV RNA RESP QL NAA+PROBE: NEGATIVE
GFR SERPL CREATININE-BSD FRML MDRD: 128 ML/MIN/1.73SQ M
GFR SERPL CREATININE-BSD FRML MDRD: 131 ML/MIN/1.73SQ M
GLUCOSE SERPL-MCNC: 103 MG/DL (ref 65–140)
GLUCOSE SERPL-MCNC: 109 MG/DL (ref 65–140)
GLUCOSE UR STRIP-MCNC: NEGATIVE MG/DL
HCT VFR BLD AUTO: 34.4 % (ref 34.8–46.1)
HCT VFR BLD AUTO: 35.2 % (ref 34.8–46.1)
HGB BLD-MCNC: 11.7 G/DL (ref 11.5–15.4)
HGB BLD-MCNC: 11.9 G/DL (ref 11.5–15.4)
HGB UR QL STRIP.AUTO: NEGATIVE
IMM GRANULOCYTES # BLD AUTO: 0.09 THOUSAND/UL (ref 0–0.2)
IMM GRANULOCYTES NFR BLD AUTO: 1 % (ref 0–2)
KETONES UR STRIP-MCNC: NEGATIVE MG/DL
LEUKOCYTE ESTERASE UR QL STRIP: NEGATIVE
LYMPHOCYTES # BLD AUTO: 1.15 THOUSANDS/ΜL (ref 0.6–4.47)
LYMPHOCYTES NFR BLD AUTO: 7 % (ref 14–44)
MCH RBC QN AUTO: 29.2 PG (ref 26.8–34.3)
MCH RBC QN AUTO: 29.3 PG (ref 26.8–34.3)
MCHC RBC AUTO-ENTMCNC: 33.8 G/DL (ref 31.4–37.4)
MCHC RBC AUTO-ENTMCNC: 34 G/DL (ref 31.4–37.4)
MCV RBC AUTO: 86 FL (ref 82–98)
MCV RBC AUTO: 86 FL (ref 82–98)
MONOCYTES # BLD AUTO: 0.89 THOUSAND/ΜL (ref 0.17–1.22)
MONOCYTES NFR BLD AUTO: 6 % (ref 4–12)
MUCOUS THREADS UR QL AUTO: ABNORMAL
NEUTROPHILS # BLD AUTO: 13.64 THOUSANDS/ΜL (ref 1.85–7.62)
NEUTS SEG NFR BLD AUTO: 86 % (ref 43–75)
NITRITE UR QL STRIP: NEGATIVE
NON-SQ EPI CELLS URNS QL MICRO: ABNORMAL /HPF
NRBC BLD AUTO-RTO: 0 /100 WBCS
PH UR STRIP.AUTO: 6.5 [PH]
PLATELET # BLD AUTO: 227 THOUSANDS/UL (ref 149–390)
PLATELET # BLD AUTO: 236 THOUSANDS/UL (ref 149–390)
PMV BLD AUTO: 9.4 FL (ref 8.9–12.7)
PMV BLD AUTO: 9.6 FL (ref 8.9–12.7)
POTASSIUM SERPL-SCNC: 3.3 MMOL/L (ref 3.5–5.3)
POTASSIUM SERPL-SCNC: 3.4 MMOL/L (ref 3.5–5.3)
PROT SERPL-MCNC: 6.8 G/DL (ref 6.4–8.4)
PROT SERPL-MCNC: 7.4 G/DL (ref 6.4–8.4)
PROT UR STRIP-MCNC: ABNORMAL MG/DL
RBC # BLD AUTO: 4 MILLION/UL (ref 3.81–5.12)
RBC # BLD AUTO: 4.08 MILLION/UL (ref 3.81–5.12)
RBC #/AREA URNS AUTO: ABNORMAL /HPF
RH BLD: POSITIVE
RSV RNA RESP QL NAA+PROBE: NEGATIVE
SARS-COV-2 RNA RESP QL NAA+PROBE: NEGATIVE
SODIUM SERPL-SCNC: 132 MMOL/L (ref 135–147)
SODIUM SERPL-SCNC: 132 MMOL/L (ref 135–147)
SP GR UR STRIP.AUTO: 1.02 (ref 1–1.03)
SPECIMEN EXPIRATION DATE: NORMAL
UROBILINOGEN UR STRIP-ACNC: 2 MG/DL
WBC # BLD AUTO: 13.24 THOUSAND/UL (ref 4.31–10.16)
WBC # BLD AUTO: 15.85 THOUSAND/UL (ref 4.31–10.16)
WBC #/AREA URNS AUTO: ABNORMAL /HPF

## 2025-01-10 PROCEDURE — 74177 CT ABD & PELVIS W/CONTRAST: CPT

## 2025-01-10 PROCEDURE — 80053 COMPREHEN METABOLIC PANEL: CPT

## 2025-01-10 PROCEDURE — 86900 BLOOD TYPING SEROLOGIC ABO: CPT

## 2025-01-10 PROCEDURE — 85027 COMPLETE CBC AUTOMATED: CPT

## 2025-01-10 PROCEDURE — 85025 COMPLETE CBC W/AUTO DIFF WBC: CPT

## 2025-01-10 PROCEDURE — 0241U HB NFCT DS VIR RESP RNA 4 TRGT: CPT

## 2025-01-10 PROCEDURE — 81001 URINALYSIS AUTO W/SCOPE: CPT

## 2025-01-10 PROCEDURE — NC001 PR NO CHARGE: Performed by: OBSTETRICS & GYNECOLOGY

## 2025-01-10 PROCEDURE — 86901 BLOOD TYPING SEROLOGIC RH(D): CPT

## 2025-01-10 PROCEDURE — 99203 OFFICE O/P NEW LOW 30 MIN: CPT

## 2025-01-10 PROCEDURE — 86780 TREPONEMA PALLIDUM: CPT

## 2025-01-10 PROCEDURE — 86850 RBC ANTIBODY SCREEN: CPT

## 2025-01-10 RX ORDER — POTASSIUM CHLORIDE 1500 MG/1
40 TABLET, EXTENDED RELEASE ORAL ONCE
Status: DISCONTINUED | OUTPATIENT
Start: 2025-01-10 | End: 2025-01-10 | Stop reason: HOSPADM

## 2025-01-10 RX ORDER — SODIUM CHLORIDE, SODIUM LACTATE, POTASSIUM CHLORIDE, CALCIUM CHLORIDE 600; 310; 30; 20 MG/100ML; MG/100ML; MG/100ML; MG/100ML
125 INJECTION, SOLUTION INTRAVENOUS CONTINUOUS
Status: DISCONTINUED | OUTPATIENT
Start: 2025-01-10 | End: 2025-01-11 | Stop reason: HOSPADM

## 2025-01-10 RX ORDER — ACETAMINOPHEN 10 MG/ML
1000 INJECTION, SOLUTION INTRAVENOUS ONCE
Status: COMPLETED | OUTPATIENT
Start: 2025-01-10 | End: 2025-01-10

## 2025-01-10 RX ADMIN — ACETAMINOPHEN 1000 MG: 10 INJECTION INTRAVENOUS at 18:18

## 2025-01-10 RX ADMIN — IOHEXOL 85 ML: 350 INJECTION, SOLUTION INTRAVENOUS at 18:42

## 2025-01-10 RX ADMIN — SODIUM CHLORIDE, SODIUM LACTATE, POTASSIUM CHLORIDE, AND CALCIUM CHLORIDE 1000 ML: .6; .31; .03; .02 INJECTION, SOLUTION INTRAVENOUS at 18:15

## 2025-01-10 RX ADMIN — SODIUM CHLORIDE, SODIUM LACTATE, POTASSIUM CHLORIDE, AND CALCIUM CHLORIDE 125 ML/HR: .6; .31; .03; .02 INJECTION, SOLUTION INTRAVENOUS at 20:05

## 2025-01-10 NOTE — H&P
H & P- Obstetrics   Ciera Maria 34 y.o. female MRN: [id number]  Unit/Bed#: LD TRIAGE  Encounter: [id number]      Assessment: 34 y.o.  at 17w2d admitted for abdominal pain.  FHT: 170  Clinical EFW: pending ; Vertex confirmed by pending  GBS status: Positive   Postpartum contraception plan: pending    1) Abdominal Pain  -Repeat CBC, CMP showing mildly elevated WBC with neurophilic predominance  -Febrile to 102.5 on admission, improved to 99.1 after 1L IVF and 1g IV tylenol  - No URI or urinary sx  -CT abdomen pelvis with contrast showing: enteritis, no appendicitis   - Given patients initial physical exam and her temperature of 102.5 admit for observation due to fever of unknown origin.     Given her high fever, RLQ tenderness, + obturator sign, rebound tenderness, leukocytosis and classic story for appendicitis, appendicitis must be ruled out as well as other intrabdominal pathology causing fever. Had extensive shared decision-making conversation with patient and her significant other regarding CAT scan of the abdomen and pregnancy.  Risk and benefits were discussed.  Plan is to move forward with CAT scan to rule out significant abdominal pathology.     Plan:   Admit for observation  Covid/flu/rsv  Serial abdominal exams  Trend fever curve and WBC  Diet per patient tolerance      Dr. Shine aware      SUBJECTIVE:    Chief Complaint: abdominal pain    HPI: Ciera Maria is a 34 y.o.  with an GUY of 2025, by Last Menstrual Period at 17w2d who is being admitted for abdominal pain. She complains of fever, has no LOF, and reports no VB. She states she has felt good FM..    Patient presents to labor and delivery triage for the second time today for abdominal pain.  States she woke up last night and had generalized abdominal pain as well as a fever of 100.8.  Presented to L&D triage this morning and was discharged.  Lab work at that time did not reveal any concerning features.   Urinalysis was negative for UTI or hematuria.  White count 13.  Otherwise no significant findings.    Patient states that her pain is now in the right lower quadrant and is worse than before.  She has had nausea all day and only was able to eat a couple crackers and some small sips of water.  Last bowel movement this a.m.  No constipation or diarrhea.  No blood in stool.  No urinary symptoms.  No URI symptoms.  No cough.  No shortness of breath.  Abdominal surgeries include gallbladder several months ago otherwise without any abdominal surgeries.  No neurologic symptoms.    Pregnancy complications: none    Patient Active Problem List   Diagnosis    Mild persistent asthma without complication    Vitamin D deficiency    Obesity (BMI 30.0-34.9)    Prediabetes    Asthma    Urinary urgency       Baby complications/comments: none    Review of Systems   Constitutional:  Positive for fatigue and fever. Negative for chills.   HENT:  Negative for congestion, postnasal drip, rhinorrhea, sinus pressure, sinus pain, sneezing and sore throat.    Respiratory:  Negative for cough, chest tightness and shortness of breath.    Cardiovascular:  Negative for chest pain and leg swelling.   Gastrointestinal:  Positive for abdominal pain and nausea. Negative for blood in stool, constipation, diarrhea, rectal pain and vomiting.   Genitourinary:  Positive for vaginal pain. Negative for decreased urine volume, dysuria, flank pain, frequency, hematuria, urgency, vaginal bleeding and vaginal discharge.   Skin:  Negative for rash.   Neurological:  Negative for seizures and syncope.   All other systems reviewed and are negative.      OB History    Para Term  AB Living   4 2 2  1 2   SAB IAB Ectopic Multiple Live Births   1   0 2      # Outcome Date GA Lbr Emigdio/2nd Weight Sex Type Anes PTL Lv   4 Current            3 SAB  7w0d          2 Term 17 37w2d / 00:18 3322 g (7 lb 5.2 oz) M Vag-Spont EPI N KITTY      Birth Comments:  none   1 Term 04/06/11 40w0d  3487 g (7 lb 11 oz) M Vag-Spont EPI N KITTY      Obstetric Comments   Preeclampsia, third trimester       Past Medical History:   Diagnosis Date    Allergic rhinitis     Asthma     dx as child    Elevated hemoglobin A1c     Gestational hypertension     A2GDM 2nd pregnancy    H/O postpartum hemorrhage, currently pregnant     with both pregnancies    Headache     Hx of preeclampsia, prior pregnancy, currently pregnant     Iron deficiency anemia     PO Fe in 3rd trimester    Urinary tract infection     Varicella     had vaccine    Visual impairment     Vitamin D deficiency     PO D3 OTC       Past Surgical History:   Procedure Laterality Date    CHOLECYSTECTOMY      WISDOM TOOTH EXTRACTION         Social History     Tobacco Use    Smoking status: Never    Smokeless tobacco: Never   Substance Use Topics    Alcohol use: Not Currently     Comment: social - rarely       No Known Allergies      Medications Prior to Admission:     albuterol (PROVENTIL HFA,VENTOLIN HFA) 90 mcg/act inhaler    ascorbic acid (VITAMIN C) 500 mg tablet    aspirin 81 mg chewable tablet    cyanocobalamin (VITAMIN B-12) 1000 MCG tablet    docusate sodium (COLACE) 100 mg capsule    neomycin-polymyxin-dexamethasone (MAXITROL) ophthalmic suspension    ondansetron (ZOFRAN) 4 mg tablet    Prenatal Vit-Fe Fumarate-FA (PRENATAL PO)    Probiotic Product (PROBIOTIC DAILY PO)        OBJECTIVE:  Vitals:  Temp:  [99.1 °F (37.3 °C)-102.5 °F (39.2 °C)] 99.1 °F (37.3 °C)  HR:  [106-116] 111  BP: (109-127)/(57-74) 120/68  Resp:  [18-20] 20  SpO2:  [99 %-100 %] 100 %  Body mass index is 37.59 kg/m².     Physical Exam:  Physical Exam  Constitutional:       General: She is not in acute distress.     Appearance: Normal appearance. She is not ill-appearing or toxic-appearing.   HENT:      Head: Normocephalic and atraumatic.   Eyes:      General: No scleral icterus.     Extraocular Movements: Extraocular movements intact.      Pupils: Pupils  are equal, round, and reactive to light.   Cardiovascular:      Rate and Rhythm: Tachycardia present.   Pulmonary:      Effort: Pulmonary effort is normal.      Breath sounds: No wheezing.   Abdominal:      General: There is no distension.      Palpations: Abdomen is soft. There is no mass.      Tenderness: There is abdominal tenderness. There is rebound. There is no right CVA tenderness, left CVA tenderness or guarding.      Hernia: No hernia is present.      Comments: RLQ rebound and tenderness. Obturator +   Musculoskeletal:      Cervical back: Normal range of motion. No rigidity.      Right lower leg: No edema.      Left lower leg: No edema.   Neurological:      General: No focal deficit present.      Mental Status: She is alert.      Cranial Nerves: No cranial nerve deficit.      Motor: No weakness.   Skin:     General: Skin is warm.      Capillary Refill: Capillary refill takes less than 2 seconds.      Coloration: Skin is not jaundiced.      Findings: No bruising, lesion or rash.   Psychiatric:         Mood and Affect: Mood normal.         Behavior: Behavior normal.         Thought Content: Thought content normal.         Judgment: Judgment normal.            Lab Results   Component Value Date    WBC 15.85 (H) 01/10/2025    HGB 11.9 01/10/2025    HCT 35.2 01/10/2025     01/10/2025     Lab Results   Component Value Date     03/14/2015    K 3.4 (L) 01/10/2025     01/10/2025    CO2 22 01/10/2025    BUN 6 01/10/2025    CREATININE 0.49 (L) 01/10/2025    GLUCOSE 94 03/14/2015    AST 13 01/10/2025    ALT 14 01/10/2025       Prenatal Labs: Reviewed      Blood type: O+  Antibody: negative  Group B strep: positive  HIV: negative  Hepatitis B: negative  RPR: non-reactive  Rubella: Immune  Varicella: Unknown  1 hour Glucose: 141  3 hour glucose: n/a  Platelets: 252  Hgb: 12.6    <2 Midnights  OBSERVATION      Moises Wiess MD  1/10/2025  9:45 PM

## 2025-01-10 NOTE — DISCHARGE INSTRUCTIONS
Pregnancy at 15 to 18 Weeks   WHAT YOU NEED TO KNOW:   What changes are happening in my body?  Now that you are in your second trimester, you have more energy. You may also feel hungrier than usual. You may start to experience other symptoms, such as heartburn or dizziness. You may be gaining about ½ to 1 pound a week, and your pregnancy is beginning to show. You may need to start wearing maternity clothes.  How do I care for myself at this stage of my pregnancy?       Manage heartburn  by eating 4 or 5 small meals each day instead of large meals. Avoid spicy foods. Avoid eating right before bedtime.         Manage nausea and vomiting.  Avoid fatty and spicy foods. Eat small meals throughout the day instead of large meals. Kimberley may help to decrease nausea. Ask your healthcare provider about other ways of decreasing nausea and vomiting.    Eat a variety of healthy foods.  Healthy foods include fruits, vegetables, whole-grain breads, low-fat dairy foods, beans, lean meats, and fish. Drink liquids as directed. Ask how much liquid to drink each day and which liquids are best for you. Limit caffeine to less than 200 milligrams each day. Limit your intake of fish to 2 servings each week. Choose fish low in mercury such as canned light tuna, shrimp, salmon, cod, or tilapia. Do not  eat fish high in mercury such as swordfish, tilefish, mary mackerel, and shark.         Take prenatal vitamins as directed.  Your need for certain vitamins and minerals, such as folic acid, increases during pregnancy. Prenatal vitamins provide some of the extra vitamins and minerals you need. Prenatal vitamins may also help to decrease the risk of certain birth defects.         Do not smoke.  Smoking increases your risk of a miscarriage and other health problems during your pregnancy. Smoking can cause your baby to be born too early or weigh less at birth. Ask your healthcare provider for information if you need help quitting.    Do not drink  alcohol.  Alcohol passes from your body to your baby through the placenta. It can affect your baby's brain development and cause fetal alcohol syndrome (FAS). FAS is a group of conditions that causes mental, behavior, and growth problems.    Talk to your healthcare provider before you take any medicines.  Many medicines may harm your baby if you take them when you are pregnant. Do not take any medicines, vitamins, herbs, or supplements without first talking to your healthcare provider. Never use illegal or street drugs (such as marijuana or cocaine) while you are pregnant.    What are some safety tips during pregnancy?   Avoid hot tubs and saunas.  Do not use a hot tub or sauna while you are pregnant, especially during your first trimester. Hot tubs and saunas may raise your baby's temperature and increase the risk of birth defects.    Avoid toxoplasmosis.  This is an infection caused by eating raw meat or being around infected cat feces. It can cause birth defects, miscarriages, and other problems. Wash your hands after you touch raw meat. Make sure any meat is well-cooked before you eat it. Avoid raw eggs and unpasteurized milk. Use gloves or ask someone else to clean your cat's litter box while you are pregnant.    What changes are happening with my baby?  By 18 weeks, your baby may be about 6 inches long from the top of the head to the rump (baby's bottom). Your baby may weigh about 11 ounces. You may be able to feel your baby's movement at about 18 weeks or later. The first movements may not be that noticeable. They may feel like a fluttering sensation. Your baby also makes sucking movements and can hear certain sounds.  What do I need to know about prenatal care?  During the first 28 weeks of your pregnancy, you will see your healthcare provider once a month. Your healthcare provider will check your blood pressure and weight. You may also need any of the following:  A urine test  may also be done to check for  sugar and protein. These can be signs of gestational diabetes or infection.    A blood test  may be done to check for anemia (low iron level).    Fundal height check  is a measurement of your uterus to check your baby's growth. This number is usually the same as the number of weeks that you have been pregnant.    An ultrasound  may be done to check your baby's development. Your healthcare provider may be able to tell you what your baby's gender is during the ultrasound.         Your baby's heart rate  will be checked.    When should I seek immediate care?   You have pain or cramping in your abdomen or low back.    You have heavy vaginal bleeding or clotting.    You pass material that looks like tissue or large clots. Collect the material and bring it with you.    When should I call my doctor or obstetrician?   You cannot keep food or drinks down, and you are losing weight.    You have light bleeding.    You have chills or a fever.    You have vaginal itching, burning, or pain.    You have yellow, green, white, or foul-smelling vaginal discharge.    You have pain or burning when you urinate, less urine than usual, or pink or bloody urine.    You have questions or concerns about your condition or care.    CARE AGREEMENT:   You have the right to help plan your care. Learn about your health condition and how it may be treated. Discuss treatment options with your healthcare providers to decide what care you want to receive. You always have the right to refuse treatment. The above information is an  only. It is not intended as medical advice for individual conditions or treatments. Talk to your doctor, nurse or pharmacist before following any medical regimen to see if it is safe and effective for you.  © Copyright [employer] 2022 Information is for End User's use only and may not be sold, redistributed or otherwise used for commercial purposes. All illustrations and images included in CareNotes® are  the copyrighted property of [employer] or [employer]

## 2025-01-10 NOTE — TELEPHONE ENCOUNTER
"OB 17w2d  with lower abdominal pain that started last night along with fever of 100.7. Took Tylenol around 1200am - temp currently 99.3. Still having lower abdominal pain/pressure - rates 5/10 for pain. Denies pain with urination but states that she feels the urge to urinate immediately after urinating. Unsure if she is starting with UTI. Denies back pain. Patient thought she has a yeast infection earlier this week due to vaginal itching and treated with OTC Monistat and symptoms resolved. Denies odor, vaginal bleeding.     ESC sent to Dr. Limon - L&D Triage     Outgoing call to patient. Informed of recommendation to be seen in L&D. Patient will proceed there now.     ESC sent to L&D Charge RN at [facility] as notification.     Reason for Disposition   Fever > 100.4 F  (38.0 C)    Answer Assessment - Initial Assessment Questions  1. SYMPTOM: \"What's the main symptom you're concerned about?\" (e.g., frequency, incontinence)      Need to urinate after finishing urination. Low grade fever.  2. ONSET: \"When did the  symptoms  start?\"      Last night  3. PAIN: \"Is there any pain?\" If Yes, ask: \"How bad is it?\" (Scale: 1-10; mild, moderate, severe)      Lower abdominal pain rates 5/10  4. CAUSE: \"What do you think is causing the symptoms?\"      Possible uti  5. OTHER SYMPTOMS: \"Do you have any other symptoms?\" (e.g., blood in urine, fever, flank pain, pain with urination)      Low grade fever   6. PREGNANCY: \"Is there any chance you are pregnant?\" \"When was your last menstrual period?\"      17w2d    Protocols used: Urinary Symptoms-Adult-[place]    "

## 2025-01-10 NOTE — PROGRESS NOTES
L&D Triage Note - OB/GYN  Ciera Maria 34 y.o. female MRN: [id number]  Unit/Bed#: LD TRIAGE 1- Encounter: [id number]      ASSESSMENT  Ciera Maria is a 34 y.o.  at 17w2d presenting with lower abdominal cramping and a low-grade fever at home.   labor workup negative. CBC wnl. CMP showed slight hypokalemia, pt given Kdur. UA showed neg nitrites/leukocytes. Culture sent to lab. At this time, not suspicious of UTI, but reassured patient I will call her and send her abx if culture results are positive. Patient safe to be discharged home.     PLAN  #1. R/o PTL:   Cervix visually closed on speculum exam, no active bleeding or abnormal discharge  Microscopy negative  Cervical length 3.14cm  SVE C/T/H  FHT: 170  [place]: no ctx    #2. Feeling incomplete voiding/abdominal cramping:   UA neg  CBC wnl, CMP slight hypokalemia  Patient can take AZO for urinary discomfort relief    Discharge instructions  Patient instructed to call if experiencing worsening contractions, vaginal bleeding, loss of fluid or decreased fetal movement.  Will follow up with OBGYN on 2025.    D/w Dr. Shine  ______________    SUBJECTIVE    GUY: Estimated Date of Delivery: 25    HPI:  34 y.o.  17w2d presents with complaint of lower abdominal cramping and low-grade fever at home last night after dinner. The abdominal cramping was a 6/10 last night but was resolved with acetaminophen (Tylenol) 500 mg tablet. Endorsed a fever last night (100.7) with chills and sweats but that has resolved. She confirms pressure build up in her bladder as well occurring at the same time. Pressure is present today and is unchanged. She denies flank pain, dysuria, hematuria, and urgency, discharge, and vaginal bleeding. She denies any sick contacts. Earlier this week, self treated with monostat for thick vaginal discharge and itching. Endorses resolution of symptoms.    Contractions: denies  Leakage of fluid: denies  Vaginal  "Bleeding: denies    Her obstetrical history is significant for 2 , 1 SAB, and pre-eclampsia in third trimester    ROS:  Constitutional: Endorses mild headache, denies N/V  Respiratory: Denies SOB, MCCANN  Cardiovascular: Denies palpitations, chest pain  Gastrointestinal: Denies changes in bowel habits, diarrhea/ constipation  : Denies dysuria, hematuria, urgency,   Pelvic: denies discharge, vaginal bleeding  Musculoskeletal: denies flank pain  Abdomen: denies abdominal cramping currently  OBJECTIVE:    Vitals:  /74 (BP Location: Right arm)   Pulse (!) 106   Temp 99.3 °F (37.4 °C) (Oral)   Resp 18   Ht 5' 4\" (1.626 m)   Wt 99.3 kg (219 lb)   LMP 2024   SpO2 99%   BMI 37.59 kg/m²   Body mass index is 37.59 kg/m².    Physical Exam  Constitutional:       Appearance: Normal appearance.   HENT:      Head: Normocephalic and atraumatic.      Nose: No congestion.   Cardiovascular:      Rate and Rhythm: Normal rate and regular rhythm.      Heart sounds: Normal heart sounds. No murmur heard.  Pulmonary:      Breath sounds: Normal breath sounds.   Abdominal:      Tenderness: There is no abdominal tenderness. There is no right CVA tenderness, left CVA tenderness or rebound.   Genitourinary:     Vagina: No vaginal discharge.   Musculoskeletal:      Cervical back: Normal range of motion. No rigidity.         Speculum exam:  Normal external female genitalia  Cervix fully visualized and not visibly dilated  Physiologic discharge  No bleeding, pooling, abnormal discharge, or lesions noted    Microscopy:     Infection:   - neg clue cells    - neg hyphae   - neg trichomonads present    Membrane status   - neg ferning   - neg nitrazene   - neg pooling     SVE:   C/T/H    FHT:   Tones in 150s    TOCO:    No ctx    IMAGING:       TVUS   Cervical length         - 3.36 cm         - 3.14 cm         - 3.24 cm      Labs: No results found for this or any previous visit (from the past 24 hours).    Sybil Farias, " MD  1/10/2025  10:18 AM

## 2025-01-10 NOTE — TELEPHONE ENCOUNTER
"Patient calling in she's 17w2d . Pt stating that she's 18w3d. Pt is having lower right side pelvic pain 8/10 and a temperature 100.8F With tylenol. Pt was seen in L&D today and was discharged home. Pt reporting the pain was coming and going earlier today but is now constant. Pt reporting fever,  Pt denies , back pain, diarrhea,  urination pain, vaginal bleeding, vaginal discharge, vomiting    Epic Secure Chat sent to Dr Joya- on call  Epic Secure Chat received: if unresolved with tylenol tho that's probably best     Patient is advised to go to [facility] Emergency room now for further evaluation, pt verbalized understanding. Pt stating manish she will leave now.         Answer Assessment - Initial Assessment Questions  1. LOCATION: \"Where does it hurt?\"       Right lower groin   2. RADIATION: \"Does the pain shoot anywhere else?\" (e.g., chest, back, shoulder)      Pt denies   3. ONSET: \"When did the pain begin?\" (e.g., minutes, hours or days ago)       Pt began yesterday   4. ONSET: \"Gradual or sudden onset?\"      Suddenly   5. PATTERN \"Does the pain come and go, or has it been constant since it started?\"       Constant   6. SEVERITY: \"How bad is the pain?\" \"What does it keep you from doing?\"  (e.g., Scale 1-10; mild, moderate, or severe)      8/10   7. RECURRENT SYMPTOM: \"Have you ever had this type of stomach pain before?\" If Yes, ask: \"When was the last time?\" and \"What happened that time?\"       Yes earlier today   8. CAUSE: \"What do you think is causing the stomach pain?\"      Pt unsure   9. RELIEVING/AGGRAVATING FACTORS: \"What makes it better or worse?\" (e.g., antacids, bowel movement, movement)      Pt denies   10. OTHER SYMPTOMS: \"Do you have any other symptoms?\" (e.g., back pain, diarrhea, fever, urination pain, vaginal bleeding, vaginal discharge, vomiting)       Pt reporting fever,  Pt denies , back pain, diarrhea,  urination pain, vaginal bleeding, vaginal discharge, vomiting  11. GUY: \"What " "date are you expecting to deliver?\"        6/18/25    Protocols used: Pregnancy - Abdominal Pain Less Than 20 Weeks EGA-Adult-[place]    "

## 2025-01-11 VITALS
HEIGHT: 64 IN | DIASTOLIC BLOOD PRESSURE: 60 MMHG | SYSTOLIC BLOOD PRESSURE: 117 MMHG | BODY MASS INDEX: 37.39 KG/M2 | WEIGHT: 219 LBS | HEART RATE: 97 BPM | TEMPERATURE: 98.2 F | RESPIRATION RATE: 17 BRPM

## 2025-01-11 PROBLEM — O26.891 ABDOMINAL PAIN DURING PREGNANCY IN FIRST TRIMESTER: Status: ACTIVE | Noted: 2025-01-11

## 2025-01-11 PROBLEM — K52.9 ENTERITIS: Status: ACTIVE | Noted: 2025-01-11

## 2025-01-11 PROBLEM — R10.9 ABDOMINAL PAIN DURING PREGNANCY IN FIRST TRIMESTER: Status: ACTIVE | Noted: 2025-01-11

## 2025-01-11 PROBLEM — Z34.90 PREGNANCY: Status: ACTIVE | Noted: 2025-01-11

## 2025-01-11 LAB — TREPONEMA PALLIDUM IGG+IGM AB [PRESENCE] IN SERUM OR PLASMA BY IMMUNOASSAY: NORMAL

## 2025-01-11 PROCEDURE — 99214 OFFICE O/P EST MOD 30 MIN: CPT

## 2025-01-11 PROCEDURE — G0379 DIRECT REFER HOSPITAL OBSERV: HCPCS

## 2025-01-11 PROCEDURE — NC001 PR NO CHARGE: Performed by: SPECIALIST

## 2025-01-11 PROCEDURE — 99232 SBSQ HOSP IP/OBS MODERATE 35: CPT | Performed by: OBSTETRICS & GYNECOLOGY

## 2025-01-11 RX ORDER — ACETAMINOPHEN 325 MG/1
975 TABLET ORAL ONCE
Status: COMPLETED | OUTPATIENT
Start: 2025-01-11 | End: 2025-01-11

## 2025-01-11 RX ORDER — ACETAMINOPHEN 500 MG
1000 TABLET ORAL EVERY 8 HOURS PRN
Start: 2025-01-11 | End: 2025-01-16

## 2025-01-11 RX ADMIN — ACETAMINOPHEN 975 MG: 325 TABLET ORAL at 04:33

## 2025-01-11 NOTE — TREATMENT PLAN
Pt reviewed and discussed with general surgery attending and OB/GYN team as well as conducted individual chart review as outlined in the event and plan and further recommendations below.    Pt is 33 yo F 17 wks pregnant with pmhx of asthma, diabetes who comes in after 1 day of RLQ pain. she was seen earlier today and had a  work up completed that was negative. sent her home with pain meds after negative workup..  Unfortunately the patient presented back with ongoing pain and some nausea without emesis but no diarrhea. On this presentation presentation she was febrile to 102.9 and tachycardic which both have resolved now. does have a WBC of 15.8 from 13.4 but this does appear to be slightly hemoconcentrated.  The patient did have a CT which demonstrates enteritis as evident by distended and thickened small bowel loops.  Interestingly enough the patient has a normal appendix without any visualized periappendiceal fat stranding or thickening.  Additionally the appendix is nondilated.  Given the absence of radiologic findings of appendicitis, no acute surgical intervention is recommended at this time.  Would instead defer to primary team for expectant management of enteritis    General Surgery will sign off at this time, please epic secure chat general surgery resident role for any further questions or concerns that may arise.    Pérez De La Rosa MD  PGY-2

## 2025-01-11 NOTE — ASSESSMENT & PLAN NOTE
Patient admitted for observation in setting of enteritis, diagnosed based on CT a/p  Tmax 102.5, now afebrile more than 8 hours after last dose of Tylenol  Tolerating clear liquids  Advance diet this AM, patient encouraged to try dry toast  Consider discharge if remains afebrile, tolerating PO, and pain is tolerable

## 2025-01-11 NOTE — PROGRESS NOTES
Progress Note - OB/GYN   Name: Ciera Maria 34 y.o. female I MRN: [id number]  Unit/Bed#: -01 I Date of Admission: 1/10/2025   Date of Service: 1/11/2025 I Hospital Day: 0     Assessment & Plan  Enteritis  Patient admitted for observation in setting of enteritis, diagnosed based on CT a/p  Tmax 102.5, now afebrile more than 8 hours after last dose of Tylenol  Tolerating clear liquids  Advance diet this AM, patient encouraged to try dry toast  Consider discharge if remains afebrile, tolerating PO, and pain is tolerable  Pregnancy  17w3d  PNL s/f GBS bacteriuria  FHT wnl - obtain daily   No obstetric complaints    OB L&D Progress Note  Subjective   Ciera is feeling much better this morning. She says the icees and juice have settled her stomach. She no longer feels nauseous. Abdominal pain was greatly decreased after she received IV tylenol; pain has started to increase again but not near previous max. Also reports a severe headache, which she says is worse than the abdominal pain. She is willing to try PO tylenol to see if this helps her head and her abdomen. She denies feeling feverish, chills, vomiting, diarrhea, RUQ pain. She denies vaginal bleeding, leakage of fluid, or contractions. She has started to feel intermittent fetal movements, and these are at her baseline.    Objective :  Temp:  [98.6 °F (37 °C)-102.5 °F (39.2 °C)] 99.3 °F (37.4 °C)  HR:  [100-116] 105  BP: (109-127)/(57-74) 113/59  Resp:  [16-20] 16  SpO2:  [99 %-100 %] 100 %    Physical Exam  Abdominal:      General: There is no distension.      Palpations: Abdomen is soft.      Tenderness: There is no abdominal tenderness. There is no guarding or rebound.   Genitourinary:     Comments: Deferred  Neurological:      Mental Status: She is alert.         Lab Results: I have reviewed the following results:CBC/BMP:   .     01/10/25  1807   WBC 15.85*   HGB 11.9   HCT 35.2      SODIUM 132*   K 3.4*      CO2 22   BUN 6   CREATININE  0.49*   GLUC 103    , Creatinine Clearance: Estimated Creatinine Clearance: 186.9 mL/min (A) (by C-G formula based on SCr of 0.49 mg/dL (L))., LFTs:   .     01/10/25  1807   AST 13   ALT 14   ALB 3.8   TBILI 0.71   ALKPHOS 52    , Lactic Acid: No new results in last 24 hours.     Rona Lim MD  OBGYN PGY2

## 2025-01-11 NOTE — CONSULTS
Consultation - Surgery-General   Name: Ciera Maria 34 y.o. female I MRN: [id number]  Unit/Bed#: -01 I Date of Admission: 1/10/2025   Date of Service: 2025 I Hospital Day: 0   Inpatient consult to Acute Care Surgery  Consult performed by: Pérez De La Rosa MD  Consult ordered by: Rona Lim MD        History of Present Illness     HPI: Ciera Maria is a 34 y.o. year old female who is 17 wks pregnant with pmhx of asthma, diabetes who comes in after 1 day of RLQ pain. she was seen earlier today and had a  work up completed that was negative. sent her home with pain meds after negative workup..  Unfortunately the patient presented back with ongoing pain and some nausea without emesis but no diarrhea. On this presentation presentation she was febrile to 102.9 and tachycardic which both have resolved now. does have a WBC of 15.8 from 13.4 but this does appear to be slightly hemoconcentrated.  The patient did have a CT which demonstrates enteritis as evident by distended and thickened small bowel loops.  Interestingly enough the patient has a normal appendix without any visualized periappendiceal fat stranding or thickening.  Additionally the appendix is nondilated.      Given the absence of radiologic findings of appendicitis, no acute surgical intervention is recommended at this time.  Would instead defer to primary team for expectant management of enteritis     General Surgery will sign off at this time, please epic secure chat general surgery resident role for any further questions or concerns that may arise.    Review of Systems  Negative except where mentioned above  Historical Information   Past Medical History:   Diagnosis Date    Allergic rhinitis     Asthma     dx as child    Elevated hemoglobin A1c     Gestational hypertension     A2GDM 2nd pregnancy    H/O postpartum hemorrhage, currently pregnant     with both pregnancies    Headache     Hx of preeclampsia, prior pregnancy,  "currently pregnant     Iron deficiency anemia     PO Fe in 3rd trimester    Urinary tract infection     Varicella     had vaccine    Visual impairment     Vitamin D deficiency     PO D3 OTC     Past Surgical History:   Procedure Laterality Date    CHOLECYSTECTOMY      WISDOM TOOTH EXTRACTION       Social History   Social History     Substance and Sexual Activity   Alcohol Use Not Currently    Comment: social - rarely     Social History     Substance and Sexual Activity   Drug Use No     Social History     Tobacco Use   Smoking Status Never   Smokeless Tobacco Never     Family History: Family history non-contributory    Meds/Allergies   all current active meds have been reviewed  No Known Allergies    Objective   Vitals: Blood pressure 117/60, pulse 97, temperature 98.2 °F (36.8 °C), resp. rate 17, height 5' 4\" (1.626 m), weight 99.3 kg (219 lb), last menstrual period 09/11/2024, unknown if currently breastfeeding.  No intake or output data in the 24 hours ending 01/11/25 1214  Invasive Devices       None                   Physical Exam  As discussed with the in-house OBGYN/L&D team     Lab Results: Results Review Statement: I reviewed radiology reports from this admission including: CT abdomen/pelvis.  Imaging Studies: Results Review Statement: I reviewed radiology reports from this admission including: CT abdomen/pelvis.  EKG, Pathology, and Other Studies: Results Review Statement: I reviewed radiology reports from this admission including: CT abdomen/pelvis.  VTE Prophylaxis: VTE covered by:    None    and Sequential compression device (Venodyne)     Code Status: Prior  Advance Directive and Living Will:      Power of :    POLST:      Counseling / Coordination of Care  Total floor / unit time spent today 20 minutes. Greater than 50% of total time was spent with the patient and / or family counseling and / or coordination of care.        "

## 2025-01-14 PROBLEM — O09.292 HISTORY OF PRE-ECLAMPSIA IN PRIOR PREGNANCY, CURRENTLY PREGNANT IN SECOND TRIMESTER: Status: ACTIVE | Noted: 2025-01-14

## 2025-01-14 PROBLEM — Z3A.18 18 WEEKS GESTATION OF PREGNANCY: Status: ACTIVE | Noted: 2025-01-11

## 2025-01-14 PROBLEM — Z86.32 HISTORY OF GESTATIONAL DIABETES IN PRIOR PREGNANCY, CURRENTLY PREGNANT IN SECOND TRIMESTER: Status: ACTIVE | Noted: 2025-01-14

## 2025-01-14 PROBLEM — O99.210 OBESITY IN PREGNANCY: Status: ACTIVE | Noted: 2025-01-14

## 2025-01-14 PROBLEM — O09.292 HISTORY OF GESTATIONAL DIABETES IN PRIOR PREGNANCY, CURRENTLY PREGNANT IN SECOND TRIMESTER: Status: ACTIVE | Noted: 2025-01-14

## 2025-01-14 NOTE — PROGRESS NOTES
Name: Ciera Maria      : 1990      MRN: [id number]  Encounter Provider: GUILLERMO Guerrero  Encounter Date: 2025   Encounter department: [facility]  :  Assessment & Plan  18 weeks gestation of pregnancy  Continue prenatal vitamins daily  Follow-up with  center scheduled for 25  Urine culture collected to complete prenatal panel  Encouraged to complete additional labs-3-hour GTT, hemoglobin A1c and TSH  Common discomforts of pregnancy and precautions reviewed.  Signs and symptoms to report reviewed.    Orders:    albuterol (ProAir HFA) 90 mcg/act inhaler; Inhale 2 puffs every 6 (six) hours as needed for wheezing    Alpha fetoprotein, maternal; Future    Obesity in pregnancy         History of pre-eclampsia in prior pregnancy, currently pregnant in second trimester  Baseline labs-WNL 12/3/24  Continue low-dose aspirin daily         History of gestational diabetes in prior pregnancy, currently pregnant in second trimester  Encouraged to complete hemoglobin A1c  Encouraged to complete 3-hour fasting GTT         Asthma affecting pregnancy in second trimester    Orders:    albuterol (ProAir HFA) 90 mcg/act inhaler; Inhale 2 puffs every 6 (six) hours as needed for wheezing    Encounter for  screening for high alpha-fetoprotein level  Patient verbalized understanding is a time sensitive test should be drawn prior to 21.6 gestational weeks  Orders:    Alpha fetoprotein, maternal; Future    RTO 4 weeks     History of Present Illness   HPI  Ciera Maria is a 34 y.o. female who presents for PN visit      Denies loss of fluid vaginal bleeding and abdominal pain. Confirms fetal movement, flutters tolerating PNV, LDASA, vitamin C and Colace well does not currently have an inhaler.  History of asthma-has not needed an inhaler in many years.      History obtained from: patient    Review of Systems   Constitutional:  Negative for chills and  fever.   Respiratory: Negative.     Cardiovascular: Negative.    Genitourinary: Negative.      Medical History Reviewed by provider this encounter:  Allergies     .     Objective   /68   Wt 101 kg (222 lb)   LMP 09/11/2024   BMI 38.11 kg/m²      Physical Exam  Vitals reviewed.   Constitutional:       Appearance: Normal appearance.   Neurological:      Mental Status: She is alert and oriented to person, place, and time.   Psychiatric:         Mood and Affect: Mood normal.         Behavior: Behavior normal.

## 2025-01-14 NOTE — ASSESSMENT & PLAN NOTE
Continue prenatal vitamins daily  Follow-up with  center scheduled for 25  Urine culture collected to complete prenatal panel  Encouraged to complete additional labs-3-hour GTT, hemoglobin A1c and TSH  Common discomforts of pregnancy and precautions reviewed.  Signs and symptoms to report reviewed.    Orders:    albuterol (ProAir HFA) 90 mcg/act inhaler; Inhale 2 puffs every 6 (six) hours as needed for wheezing    Alpha fetoprotein, maternal; Future

## 2025-01-16 ENCOUNTER — ROUTINE PRENATAL (OUTPATIENT)
Dept: OBGYN CLINIC | Facility: MEDICAL CENTER | Age: 35
End: 2025-01-16

## 2025-01-16 VITALS — BODY MASS INDEX: 38.11 KG/M2 | DIASTOLIC BLOOD PRESSURE: 68 MMHG | SYSTOLIC BLOOD PRESSURE: 112 MMHG | WEIGHT: 222 LBS

## 2025-01-16 DIAGNOSIS — Z3A.18 18 WEEKS GESTATION OF PREGNANCY: ICD-10-CM

## 2025-01-16 DIAGNOSIS — Z36.1 ENCOUNTER FOR ANTENATAL SCREENING FOR HIGH ALPHA-FETOPROTEIN LEVEL: ICD-10-CM

## 2025-01-16 DIAGNOSIS — J45.909 ASTHMA AFFECTING PREGNANCY IN SECOND TRIMESTER: ICD-10-CM

## 2025-01-16 DIAGNOSIS — O09.292 HISTORY OF PRE-ECLAMPSIA IN PRIOR PREGNANCY, CURRENTLY PREGNANT IN SECOND TRIMESTER: ICD-10-CM

## 2025-01-16 DIAGNOSIS — O99.512 ASTHMA AFFECTING PREGNANCY IN SECOND TRIMESTER: ICD-10-CM

## 2025-01-16 DIAGNOSIS — Z86.32 HISTORY OF GESTATIONAL DIABETES IN PRIOR PREGNANCY, CURRENTLY PREGNANT IN SECOND TRIMESTER: ICD-10-CM

## 2025-01-16 DIAGNOSIS — O99.210 OBESITY IN PREGNANCY: Primary | ICD-10-CM

## 2025-01-16 DIAGNOSIS — O09.292 HISTORY OF GESTATIONAL DIABETES IN PRIOR PREGNANCY, CURRENTLY PREGNANT IN SECOND TRIMESTER: ICD-10-CM

## 2025-01-16 PROCEDURE — PNV: Performed by: NURSE PRACTITIONER

## 2025-01-16 RX ORDER — ALBUTEROL SULFATE 90 UG/1
2 INHALANT RESPIRATORY (INHALATION) EVERY 6 HOURS PRN
Qty: 8.5 G | Refills: 1 | Status: SHIPPED | OUTPATIENT
Start: 2025-01-16

## 2025-01-21 ENCOUNTER — TELEPHONE (OUTPATIENT)
Dept: OBGYN CLINIC | Facility: MEDICAL CENTER | Age: 35
End: 2025-01-21

## 2025-01-27 ENCOUNTER — ROUTINE PRENATAL (OUTPATIENT)
Facility: HOSPITAL | Age: 35
End: 2025-01-27
Payer: COMMERCIAL

## 2025-01-27 VITALS
HEIGHT: 64 IN | OXYGEN SATURATION: 98 % | BODY MASS INDEX: 38.35 KG/M2 | WEIGHT: 224.6 LBS | SYSTOLIC BLOOD PRESSURE: 130 MMHG | DIASTOLIC BLOOD PRESSURE: 72 MMHG | HEART RATE: 83 BPM

## 2025-01-27 DIAGNOSIS — Z36.86 ENCOUNTER FOR ANTENATAL SCREENING FOR CERVICAL LENGTH: ICD-10-CM

## 2025-01-27 DIAGNOSIS — Z3A.20 20 WEEKS GESTATION OF PREGNANCY: Primary | ICD-10-CM

## 2025-01-27 DIAGNOSIS — E66.09 OTHER OBESITY DUE TO EXCESS CALORIES AFFECTING PREGNANCY, ANTEPARTUM: ICD-10-CM

## 2025-01-27 DIAGNOSIS — O99.210 OBESITY IN PREGNANCY: ICD-10-CM

## 2025-01-27 DIAGNOSIS — O99.210 OTHER OBESITY DUE TO EXCESS CALORIES AFFECTING PREGNANCY, ANTEPARTUM: ICD-10-CM

## 2025-01-27 PROCEDURE — 99213 OFFICE O/P EST LOW 20 MIN: CPT | Performed by: OBSTETRICS & GYNECOLOGY

## 2025-01-27 PROCEDURE — 76811 OB US DETAILED SNGL FETUS: CPT | Performed by: OBSTETRICS & GYNECOLOGY

## 2025-01-27 PROCEDURE — 76817 TRANSVAGINAL US OBSTETRIC: CPT | Performed by: OBSTETRICS & GYNECOLOGY

## 2025-01-27 NOTE — PROGRESS NOTES
Ultrasound Probe Disinfection    A transvaginal ultrasound was performed.   Prior to use, disinfection was performed with High Level Disinfection Process ([employer]).  Probe serial number [id number] was used.    Olga Faye  01/27/25  10:02 AM

## 2025-01-27 NOTE — PROGRESS NOTES
The patient was seen today for an ultrasound.  Please see ultrasound report (located under Ob Procedures) for additional details.   Thank you very much for allowing us to participate in the care of this very nice patient.  Should you have any questions, please do not hesitate to contact me.     Wili Wan MD FACOG  Attending Physician, Maternal-Fetal Medicine  [facility]

## 2025-01-28 ENCOUNTER — PATIENT MESSAGE (OUTPATIENT)
Dept: OBGYN CLINIC | Facility: MEDICAL CENTER | Age: 35
End: 2025-01-28

## 2025-01-29 NOTE — PATIENT COMMUNICATION
Called and rescheduled patient's 4/3 appointment and explained to the patient that it is our protocol that she sees all the provider's throughout her pregnancy and patient was understanding. The patient just prefers to see only providers that deliver for the end of her pregnancy, so she was ok keeping her appointments the way they are currently scheduled.

## 2025-02-11 ENCOUNTER — ULTRASOUND (OUTPATIENT)
Dept: PERINATAL CARE | Facility: CLINIC | Age: 35
End: 2025-02-11
Payer: COMMERCIAL

## 2025-02-11 VITALS
HEART RATE: 87 BPM | SYSTOLIC BLOOD PRESSURE: 124 MMHG | WEIGHT: 226.4 LBS | HEIGHT: 64 IN | BODY MASS INDEX: 38.65 KG/M2 | DIASTOLIC BLOOD PRESSURE: 70 MMHG

## 2025-02-11 DIAGNOSIS — Z3A.23 23 WEEKS GESTATION OF PREGNANCY: Primary | ICD-10-CM

## 2025-02-11 DIAGNOSIS — O09.292 HISTORY OF GESTATIONAL DIABETES IN PRIOR PREGNANCY, CURRENTLY PREGNANT IN SECOND TRIMESTER: ICD-10-CM

## 2025-02-11 DIAGNOSIS — Z86.32 HISTORY OF GESTATIONAL DIABETES IN PRIOR PREGNANCY, CURRENTLY PREGNANT IN SECOND TRIMESTER: ICD-10-CM

## 2025-02-11 DIAGNOSIS — O99.210 OBESITY IN PREGNANCY: ICD-10-CM

## 2025-02-11 DIAGNOSIS — Z36.2 ENCOUNTER FOR FOLLOW-UP ULTRASOUND OF FETAL ANATOMY: ICD-10-CM

## 2025-02-11 PROBLEM — R73.03 PREDIABETES: Status: RESOLVED | Noted: 2023-05-18 | Resolved: 2025-02-11

## 2025-02-11 PROBLEM — R39.15 URINARY URGENCY: Status: RESOLVED | Noted: 2025-01-10 | Resolved: 2025-02-11

## 2025-02-11 PROCEDURE — 99213 OFFICE O/P EST LOW 20 MIN: CPT | Performed by: STUDENT IN AN ORGANIZED HEALTH CARE EDUCATION/TRAINING PROGRAM

## 2025-02-11 PROCEDURE — 76816 OB US FOLLOW-UP PER FETUS: CPT | Performed by: STUDENT IN AN ORGANIZED HEALTH CARE EDUCATION/TRAINING PROGRAM

## 2025-02-11 RX ORDER — FAMOTIDINE 20 MG/1
20 TABLET, FILM COATED ORAL AS NEEDED
COMMUNITY
Start: 2025-01-27

## 2025-02-11 NOTE — ASSESSMENT & PLAN NOTE
Early GTT elevated @ 141; has 3 hour ordered   Encouraged to complete 3 hour GTT   Planning to complete between 24-28 weeks

## 2025-02-11 NOTE — PROGRESS NOTES
Name: Ciera Maria      : 1990      MRN: [id number]  Encounter Provider: GUILLERMO Guerrero  Encounter Date: 2025   Encounter department: [facility]  :  Assessment & Plan  23 weeks gestation of pregnancy  Continue prenatal vitamins daily  Follow-up with  center scheduled for 25  Urine culture collected to complete prenatal panel  Encouraged to complete additional labs-3-hour GTT, hemoglobin A1c and TSH  Common discomforts of pregnancy and precautions reviewed.  Signs and symptoms to report reviewed.           Obesity in pregnancy  Pregravid BMI 35.34         History of pre-eclampsia in prior pregnancy, currently pregnant in second trimester  Baseline labs-WNL 12/3/24  Continue low-dose aspirin daily           History of gestational diabetes in prior pregnancy, currently pregnant in second trimester  Early GTT elevated @ 141; has 3 hour ordered   Encouraged to complete 3 hour GTT   Planning to complete between 24-28 weeks         RTO 4 weeks     History of Present Illness   HPI  Ciera Maria is a 34 y.o. female who presents for PN visit      Denies loss of fluid, vaginal bleeding and abdominal pain. Confirms frequent fetal movement. Tolerating PNV and LDASA well. Has not completed 3 hour GTT, TSH or Hgb A1C. Will collect urine culture today.    History obtained from: patient    Review of Systems   Constitutional:  Negative for chills and fever.   Respiratory: Negative.     Cardiovascular: Negative.      Medical History Reviewed by provider this encounter:  Allergies  Meds  Problems     .     Objective   LMP 2024      Physical Exam  Constitutional:       Appearance: Normal appearance.   Neurological:      Mental Status: She is alert and oriented to person, place, and time.   Psychiatric:         Mood and Affect: Mood normal.         Behavior: Behavior normal.

## 2025-02-11 NOTE — PROGRESS NOTES
"[facility]: Ms. Maria was seen today for followup missed anatomy ultrasound.  See ultrasound report under \"OB Procedures\" tab.   The time spent on this established patient on the encounter date included 5 minutes previsit service time reviewing records and precharting, 5 minutes face-to-face service time counseling regarding results and coordinating care, and  5 minutes charting, totalling 15 minutes.  Please don't hesitate to contact our office with any concerns or questions.  -Leanna Meehan MD    "

## 2025-02-11 NOTE — ASSESSMENT & PLAN NOTE
Continue prenatal vitamins daily  Follow-up with  center scheduled for 25  Urine culture collected to complete prenatal panel  Encouraged to complete additional labs-3-hour GTT, hemoglobin A1c and TSH  Common discomforts of pregnancy and precautions reviewed.  Signs and symptoms to report reviewed.

## 2025-02-13 ENCOUNTER — ROUTINE PRENATAL (OUTPATIENT)
Dept: OBGYN CLINIC | Facility: MEDICAL CENTER | Age: 35
End: 2025-02-13

## 2025-02-13 VITALS — SYSTOLIC BLOOD PRESSURE: 106 MMHG | BODY MASS INDEX: 38.71 KG/M2 | WEIGHT: 225.5 LBS | DIASTOLIC BLOOD PRESSURE: 72 MMHG

## 2025-02-13 DIAGNOSIS — O09.292 HISTORY OF GESTATIONAL DIABETES IN PRIOR PREGNANCY, CURRENTLY PREGNANT IN SECOND TRIMESTER: ICD-10-CM

## 2025-02-13 DIAGNOSIS — Z3A.23 23 WEEKS GESTATION OF PREGNANCY: ICD-10-CM

## 2025-02-13 DIAGNOSIS — O99.210 OBESITY IN PREGNANCY: Primary | ICD-10-CM

## 2025-02-13 DIAGNOSIS — O09.292 HISTORY OF PRE-ECLAMPSIA IN PRIOR PREGNANCY, CURRENTLY PREGNANT IN SECOND TRIMESTER: ICD-10-CM

## 2025-02-13 DIAGNOSIS — Z86.32 HISTORY OF GESTATIONAL DIABETES IN PRIOR PREGNANCY, CURRENTLY PREGNANT IN SECOND TRIMESTER: ICD-10-CM

## 2025-02-13 PROCEDURE — 87147 CULTURE TYPE IMMUNOLOGIC: CPT | Performed by: NURSE PRACTITIONER

## 2025-02-13 PROCEDURE — 87086 URINE CULTURE/COLONY COUNT: CPT | Performed by: NURSE PRACTITIONER

## 2025-02-13 PROCEDURE — PNV: Performed by: NURSE PRACTITIONER

## 2025-02-14 ENCOUNTER — TELEPHONE (OUTPATIENT)
Dept: PERINATAL CARE | Facility: OTHER | Age: 35
End: 2025-02-14

## 2025-02-14 NOTE — TELEPHONE ENCOUNTER
TC to patient: patient did not have her glucose tolerance test thus far, she stated she spoke with her OB and will have done around 24 wks and then we can reschedule the fetal ECHO if needed. Fetal ECHO was only ordered with thought that patient had GDM. Cancel fetal ECHO as per GUILLERMO Kirk/Dr. Calvin.   Patient will let MFM know when she has her 3 hour glucose test done

## 2025-02-15 LAB
BACTERIA UR CULT: ABNORMAL
BACTERIA UR CULT: ABNORMAL

## 2025-02-17 ENCOUNTER — RESULTS FOLLOW-UP (OUTPATIENT)
Dept: OBGYN CLINIC | Facility: CLINIC | Age: 35
End: 2025-02-17

## 2025-02-17 DIAGNOSIS — Z3A.30 30 WEEKS GESTATION OF PREGNANCY: ICD-10-CM

## 2025-02-17 DIAGNOSIS — O99.013 ANEMIA DURING PREGNANCY IN THIRD TRIMESTER: Primary | ICD-10-CM

## 2025-03-02 ENCOUNTER — HOSPITAL ENCOUNTER (OUTPATIENT)
Facility: HOSPITAL | Age: 35
Discharge: HOME | End: 2025-03-03
Attending: OBSTETRICS & GYNECOLOGY | Admitting: OBSTETRICS & GYNECOLOGY
Payer: COMMERCIAL

## 2025-03-02 DIAGNOSIS — B37.31 YEAST VAGINITIS: Primary | ICD-10-CM

## 2025-03-02 PROBLEM — O26.899 VAGINAL DISCHARGE IN PREGNANCY: Status: ACTIVE | Noted: 2025-03-02

## 2025-03-02 PROBLEM — N89.8 VAGINAL DISCHARGE IN PREGNANCY: Status: ACTIVE | Noted: 2025-03-02

## 2025-03-02 PROCEDURE — NC001 PR NO CHARGE: Performed by: OBSTETRICS & GYNECOLOGY

## 2025-03-02 RX ORDER — FLUCONAZOLE 150 MG/1
150 TABLET ORAL ONCE
Qty: 1 TABLET | Refills: 0 | Status: SHIPPED | OUTPATIENT
Start: 2025-03-03 | End: 2025-03-03

## 2025-03-03 VITALS
RESPIRATION RATE: 18 BRPM | HEART RATE: 74 BPM | TEMPERATURE: 98.3 F | DIASTOLIC BLOOD PRESSURE: 61 MMHG | SYSTOLIC BLOOD PRESSURE: 120 MMHG

## 2025-03-03 PROCEDURE — 76815 OB US LIMITED FETUS(S): CPT | Performed by: STUDENT IN AN ORGANIZED HEALTH CARE EDUCATION/TRAINING PROGRAM

## 2025-03-03 PROCEDURE — 76815 OB US LIMITED FETUS(S): CPT | Performed by: OBSTETRICS & GYNECOLOGY

## 2025-03-03 PROCEDURE — 99211 OFF/OP EST MAY X REQ PHY/QHP: CPT

## 2025-03-03 NOTE — PROGRESS NOTES
Triage Note - OB  Ciera Maria 35 y.o. female MRN: [id number]  Unit/Bed#:  TRIAGE 1-01 Encounter: [id number]    OB TRIAGE NOTE  Ciera Maria  [id number]  3/3/2025  12:06 AM  LD TRIAGE 1/LD TRIAGE 1-*    ASSESS:  35 y.o.  25w5d with concern for LOF. Speculum exam with evidence of yeast infection with white spots on cervix and vaginal discharge. Further supported by pseudohyphae on microscopic slide exam. Low concern for water breaking, as SHOLA WNL, negative Nitrazine test, and no ferning on microscopic slide exam.     PLAN  #1. LOF  Speculum exam with vaginal discharge and cervix with small white patches    bpm, Cat I tracing   Slana with no contractions   Nitrazine negative   SHOLA of 14, WNL   Microscopic exam significant for pseudohyphae, no clue cells or ferning   Ordered diflucan for discharge   Reviewed return precautions     #2. Discharge instructions  Patient instructed to call if experiencing worsening contractions, vaginal bleeding, loss of fluid or decreased fetal movement.  Will follow up with OBGYN on 3/13/25.    D/w Dr. Dunn   ______________    SUBJECTIVE    GUY: Estimated Date of Delivery: 6/10/25    HPI Chronology:  35 y.o.  25w5d presents with complaint of LOF. Pt states at 0800 this morning she went to the bathroom to urinate, came out of the bathroom and was cleaning her house when she felt a big gush of fluid. She was not lifting anything heavy while cleaning. The fluid leaked through her underwear and pants at that time but eventually stopped. Pt reported a clear, colorless liquid at that time with no smell. She did not think it was urine. She had another episode of feeling fluid leak around 1600. It was not as large as the initial incident. Pt did have intercourse today. No history of abdominal trauma. Denies dysuria, vaginal discharge. Feels like she is having her Lester-cannon contractions that are at their baseline severity, occurring with no pattern, and  happening less than 6 times per hour.    Contractions: Lester-cannon  Leakage: Yes  Bleeding: No  Fetal Movement: Yes  Pelvic pain: No    Vitals:   /61   Pulse 74   Temp 98.3 °F (36.8 °C) (Oral)   LMP 09/11/2024   There is no height or weight on file to calculate BMI.    Review of Systems   HENT:  Negative for congestion.    Eyes:  Negative for visual disturbance.   Respiratory:  Negative for shortness of breath.    Cardiovascular:  Negative for chest pain.   Gastrointestinal:  Negative for abdominal pain.   Genitourinary:  Negative for dysuria, vaginal bleeding and vaginal discharge.   Skin:  Negative for rash.   Neurological:  Negative for headaches.   Psychiatric/Behavioral:  Negative for confusion.        Physical Exam  Vitals and nursing note reviewed.   Constitutional:       General: She is not in acute distress.     Appearance: Normal appearance. She is not ill-appearing or toxic-appearing.   HENT:      Head: Normocephalic and atraumatic.      Nose: Nose normal.   Eyes:      Conjunctiva/sclera: Conjunctivae normal.   Pulmonary:      Effort: Pulmonary effort is normal. No respiratory distress.   Abdominal:      Comments: Gravid   Skin:     General: Skin is warm and dry.      Findings: No rash.   Neurological:      General: No focal deficit present.      Mental Status: She is alert and oriented to person, place, and time.   Psychiatric:         Mood and Affect: Mood normal.         Behavior: Behavior normal.         Thought Content: Thought content normal.       FHT:  145 bpm   Moderate variability   15x15 accelerations   No decelerations     TOCO: No contractions     Labs: No results found for this or any previous visit (from the past 24 hours).    Lab, Imaging and other studies: I have personally reviewed pertinent reports.    Sandrita Wiley DO  3/3/2025  12:06 AM

## 2025-03-03 NOTE — DISCHARGE INSTR - AVS FIRST PAGE
Pregnancy at 23 to 26 Weeks   WHAT YOU NEED TO KNOW:   What changes are happening in my body?  You are now close to or at the beginning of the third trimester. The third trimester starts at 24 weeks and ends with delivery. As your baby gets larger, you may develop certain symptoms. These may include pain in your back or down the sides of your abdomen. You may also have stretch marks on your abdomen, breasts, thighs, or buttocks. You may also have constipation.  How do I care for myself at this stage of my pregnancy?       Eat a variety of healthy foods.  Healthy foods include fruits, vegetables, whole-grain breads, low-fat dairy foods, beans, lean meats, and fish. Drink liquids as directed. Ask how much liquid to drink each day and which liquids are best for you. Limit caffeine to less than 200 milligrams each day. Limit your intake of fish to 2 servings each week. Choose fish low in mercury such as canned light tuna, shrimp, salmon, cod, or tilapia. Do not  eat fish high in mercury such as swordfish, tilefish, mary mackerel, and shark.         Manage back pain.  Do not stand for long periods of time or lift heavy items. Use good posture while you stand, squat, or bend. Wear low-heeled shoes with good support. Rest may also help to relieve back pain. Ask your healthcare provider about exercises you can do to strengthen your back muscles.    Take prenatal vitamins as directed.  Your need for certain vitamins and minerals, such as folic acid, increases during pregnancy. Prenatal vitamins provide some of the extra vitamins and minerals you need. Prenatal vitamins may also help to decrease the risk of certain birth defects.         Talk to your healthcare provider about exercise.  Moderate exercise can help you stay fit. Your healthcare provider will help you plan an exercise program that is safe for you during pregnancy.         Do not smoke.  Smoking increases your risk of a miscarriage and other health problems  during your pregnancy. Smoking can cause your baby to be born too early or weigh less at birth. Ask your healthcare provider for information if you need help quitting.    Do not drink alcohol.  Alcohol passes from your body to your baby through the placenta. It can affect your baby's brain development and cause fetal alcohol syndrome (FAS). FAS is a group of conditions that causes mental, behavior, and growth problems.    Talk to your healthcare provider before you take any medicines.  Many medicines may harm your baby if you take them when you are pregnant. Do not take any medicines, vitamins, herbs, or supplements without first talking to your healthcare provider. Never use illegal or street drugs (such as marijuana or cocaine) while you are pregnant.    What are some safety tips during pregnancy?   Avoid hot tubs and saunas.  Do not use a hot tub or sauna while you are pregnant, especially during your first trimester. Hot tubs and saunas may raise your baby's temperature and increase the risk of birth defects.    Avoid toxoplasmosis.  This is an infection caused by eating raw meat or being around infected cat feces. It can cause birth defects, miscarriages, and other problems. Wash your hands after you touch raw meat. Make sure any meat is well-cooked before you eat it. Avoid raw eggs and unpasteurized milk. Use gloves or ask someone else to clean your cat's litter box while you are pregnant.       What changes are happening with my baby?  By 26 weeks, your baby will weigh about 2 pounds. Your baby will be about 10 inches long from the top of the head to the rump (baby's bottom). Your baby's movements are much stronger now. Your baby's eyes are almost completely formed and can partially open. Your baby also sleeps and wakes up.  What do I need to know about prenatal care?  Your healthcare provider will check your blood pressure and weight. You may also need the following:  A urine test  may also be done to check  for sugar and protein. These can be signs of gestational diabetes or infection. Protein in your urine may also be a sign of preeclampsia. Preeclampsia is a condition that can develop during week 20 or later of your pregnancy. It causes high blood pressure, and it can cause problems with your kidneys and other organs.    A gestational diabetes screen  may be done. Your healthcare provider may order either a 1-step or 2-step oral glucose tolerance test (OGTT).     1-step OGTT:  Your blood sugar level will be tested after you have not eaten for 8 hours (fasting). You will then be given a glucose drink. Your level will be tested again 1 hour and 2 hours after you finish the drink.    2-step OGTT:  You do not have to fast for the first part of the test. You will have the glucose drink at any time of day. Your blood sugar level will be checked 1 hour later. If your blood sugar is higher than a certain level, another test will be ordered. You will fast and your blood sugar level will be tested. You will have the glucose drink. Your blood will be tested again 1 hour, 2 hours, and 3 hours after you finish the glucose drink.    Fundal height  is a measurement of your uterus to check your baby's growth. This number is usually the same as the number of weeks that you have been pregnant.    Your baby's heart rate  will be checked.    When should I seek immediate care?   You develop a severe headache that does not go away.    You have new or increased vision changes, such as blurred or spotted vision.    You have new or increased swelling in your face or hands.    You have vaginal spotting or bleeding.    Your water broke or you feel warm water gushing or trickling from your vagina.    When should I call my doctor or obstetrician?   You have abdominal cramps, pressure, or tightening.    You have a change in vaginal discharge.    You have light bleeding.    You have chills or a fever.    You have vaginal itching, burning, or  pain.    You have yellow, green, white, or foul-smelling vaginal discharge.    You have pain or burning when you urinate, less urine than usual, or pink or bloody urine.    You have questions or concerns about your condition or care.    CARE AGREEMENT:   You have the right to help plan your care. Learn about your health condition and how it may be treated. Discuss treatment options with your healthcare providers to decide what care you want to receive. You always have the right to refuse treatment. The above information is an  only. It is not intended as medical advice for individual conditions or treatments. Talk to your doctor, nurse or pharmacist before following any medical regimen to see if it is safe and effective for you.  © Copyright [employer] 2022 Information is for End User's use only and may not be sold, redistributed or otherwise used for commercial purposes. All illustrations and images included in CareNotes® are the copyrighted property of [employer] or [employer]

## 2025-03-03 NOTE — PROCEDURES
Ciera Ferreiraenez, a  at 25w6d with an GUY of 6/10/2025, by Ultrasound, was seen at [facility] for the following procedure(s): $Procedure Type: SHOLA]        4 Quadrant SOHLA  SHOLA Q1 (cm): 5.4 cm  SHOLA Q2 (cm): 1.9 cm  SHOLA Q3 (cm): 3.8 cm  SHOLA Q4 (cm): 3.7 cm  SHOLA TOTAL (cm): 14.8 cm    Sandrita Wiley DO   PGY-2 Rural  Residency   [facility]

## 2025-03-13 ENCOUNTER — TELEPHONE (OUTPATIENT)
Age: 35
End: 2025-03-13

## 2025-03-14 ENCOUNTER — ROUTINE PRENATAL (OUTPATIENT)
Dept: OBGYN CLINIC | Facility: CLINIC | Age: 35
End: 2025-03-14
Payer: COMMERCIAL

## 2025-03-14 VITALS — WEIGHT: 230.6 LBS | SYSTOLIC BLOOD PRESSURE: 102 MMHG | DIASTOLIC BLOOD PRESSURE: 62 MMHG | BODY MASS INDEX: 39.58 KG/M2

## 2025-03-14 DIAGNOSIS — Z34.92 SECOND TRIMESTER PREGNANCY: Primary | ICD-10-CM

## 2025-03-14 DIAGNOSIS — Z23 ENCOUNTER FOR IMMUNIZATION: ICD-10-CM

## 2025-03-14 DIAGNOSIS — Z3A.27 27 WEEKS GESTATION OF PREGNANCY: ICD-10-CM

## 2025-03-14 DIAGNOSIS — R23.8 SKIN IRRITATION: ICD-10-CM

## 2025-03-14 LAB
DME PARACHUTE DELIVERY DATE REQUESTED: NORMAL
DME PARACHUTE ITEM DESCRIPTION: NORMAL
DME PARACHUTE ORDER STATUS: NORMAL
DME PARACHUTE SUPPLIER NAME: NORMAL
DME PARACHUTE SUPPLIER PHONE: NORMAL

## 2025-03-14 PROCEDURE — 90715 TDAP VACCINE 7 YRS/> IM: CPT | Performed by: ADVANCED PRACTICE MIDWIFE

## 2025-03-14 PROCEDURE — PNV: Performed by: ADVANCED PRACTICE MIDWIFE

## 2025-03-14 PROCEDURE — 90471 IMMUNIZATION ADMIN: CPT | Performed by: ADVANCED PRACTICE MIDWIFE

## 2025-03-14 RX ORDER — CLOTRIMAZOLE AND BETAMETHASONE DIPROPIONATE 10; .64 MG/G; MG/G
CREAM TOPICAL 2 TIMES DAILY
Qty: 45 G | Refills: 1 | Status: SHIPPED | OUTPATIENT
Start: 2025-03-14

## 2025-03-14 NOTE — PROGRESS NOTES
Name: Ciera Maria      : 1990      MRN: [id number]  Encounter Provider: Griselda Humphreys CNM  Encounter Date: 3/14/2025   Encounter department: [facility] [place]  :  Assessment & Plan  Skin irritation    Orders:  •  clotrimazole-betamethasone (LOTRISONE) 1-0.05 % cream; Apply topically 2 (two) times a day    Encounter for immunization    Orders:  •  Tdap Vaccine greater than or equal to 6yo    Second trimester pregnancy    Orders:  •  Tdap Vaccine greater than or equal to 6yo    27 weeks gestation of pregnancy  - Birth plan given, peds list given, birth consent signed  - 28 wk labs need to be completed  - Tdap given, recommendations reviewed for family vaccination  - Rhogam not indicated  - pump ordered  - growth scan 2025  - next visit 2 weeks    Orders:  •  Tdap Vaccine greater than or equal to 6yo        History of Present Illness   Ciera Maria is a 35 y.o.  female who presents for routine prenatal care at 27w3d.  Pregnancy ROS: no leakage of fluid, pelvic pain, or vaginal bleeding.  Good fetal movement.    Objective:  /62   Wt 105 kg (230 lb 9.6 oz)   LMP 2024   BMI 39.58 kg/m²   Pregravid Weight/BMI: 93.4 kg (206 lb) (BMI 35.34)  Current Weight: 105 kg (230 lb 9.6 oz)   Total Weight Gain: 11.2 kg (24 lb 9.6 oz)   Pre- Vitals    Flowsheet Row Most Recent Value   Prenatal Assessment    Fetal Heart Rate 152   Movement Present   Prenatal Vitals    Blood Pressure 102/62   Weight - Scale 105 kg (230 lb 9.6 oz)   Urine Albumin/Glucose    Dilation/Effacement/Station    Vaginal Drainage    Edema    LLE Edema None   RLE Edema None             Ciera Maria is a 35 y.o. female who presents for prenatal visit  History obtained from: patient    Review of Systems   Constitutional:  Negative for chills and fever.   Respiratory:  Negative for cough and shortness of breath.    Cardiovascular:  Negative for chest pain and palpitations.    Genitourinary:  Negative for difficulty urinating and vaginal bleeding.   Psychiatric/Behavioral:  The patient is not nervous/anxious.      Medical History Reviewed by provider this encounter:     .  Current Outpatient Medications on File Prior to Visit   Medication Sig Dispense Refill   • albuterol (ProAir HFA) 90 mcg/act inhaler Inhale 2 puffs every 6 (six) hours as needed for wheezing 8.5 g 1   • ascorbic acid (VITAMIN C) 500 mg tablet Take 500 mg by mouth daily     • aspirin 81 mg chewable tablet Chew 81 mg daily 2x every night     • docusate sodium (COLACE) 100 mg capsule Take 100 mg by mouth 2 (two) times a day     • famotidine (Pepcid) 20 mg tablet Take 20 mg by mouth as needed for heartburn or indigestion     • Prenatal Vit-Fe Fumarate-FA (PRENATAL PO) Take 1 tablet by mouth in the morning     • Probiotic Product (PROBIOTIC DAILY PO) Take 1 tablet by mouth in the morning       No current facility-administered medications on file prior to visit.         Objective   /62   Wt 105 kg (230 lb 9.6 oz)   LMP 09/11/2024   BMI 39.58 kg/m²      Physical Exam  Vitals reviewed.   Constitutional:       Appearance: Normal appearance.   Pulmonary:      Effort: Pulmonary effort is normal.   Abdominal:      Comments: Gravid uterus   Neurological:      Mental Status: She is alert and oriented to person, place, and time.   Psychiatric:         Mood and Affect: Mood normal.         Behavior: Behavior normal.

## 2025-03-14 NOTE — ASSESSMENT & PLAN NOTE
- Birth plan given, peds list given, birth consent signed  - 28 wk labs need to be completed  - Tdap given, recommendations reviewed for family vaccination  - Rhogam not indicated  - pump ordered  - growth scan 4/16/2025  - next visit 2 weeks    Orders:  •  Tdap Vaccine greater than or equal to 8yo

## 2025-03-24 LAB
DME PARACHUTE DELIVERY DATE ACTUAL: NORMAL
DME PARACHUTE DELIVERY DATE REQUESTED: NORMAL
DME PARACHUTE ITEM DESCRIPTION: NORMAL
DME PARACHUTE ORDER STATUS: NORMAL
DME PARACHUTE SUPPLIER NAME: NORMAL
DME PARACHUTE SUPPLIER PHONE: NORMAL

## 2025-03-31 ENCOUNTER — APPOINTMENT (OUTPATIENT)
Dept: LAB | Facility: HOSPITAL | Age: 35
End: 2025-03-31
Payer: COMMERCIAL

## 2025-03-31 ENCOUNTER — CLINICAL SUPPORT (OUTPATIENT)
Dept: POSTPARTUM | Facility: CLINIC | Age: 35
End: 2025-03-31

## 2025-03-31 DIAGNOSIS — Z3A.23 23 WEEKS GESTATION OF PREGNANCY: ICD-10-CM

## 2025-03-31 DIAGNOSIS — Z32.2 ENCOUNTER FOR CHILDBIRTH INSTRUCTION: Primary | ICD-10-CM

## 2025-03-31 DIAGNOSIS — O09.292 HISTORY OF PRE-ECLAMPSIA IN PRIOR PREGNANCY, CURRENTLY PREGNANT IN SECOND TRIMESTER: ICD-10-CM

## 2025-03-31 DIAGNOSIS — R73.09 ELEVATED GLUCOSE TOLERANCE TEST: ICD-10-CM

## 2025-03-31 LAB
BASOPHILS # BLD AUTO: 0.03 THOUSANDS/ÂΜL (ref 0–0.1)
BASOPHILS NFR BLD AUTO: 0 % (ref 0–1)
EOSINOPHIL # BLD AUTO: 0.11 THOUSAND/ÂΜL (ref 0–0.61)
EOSINOPHIL NFR BLD AUTO: 1 % (ref 0–6)
ERYTHROCYTE [DISTWIDTH] IN BLOOD BY AUTOMATED COUNT: 14.3 % (ref 11.6–15.1)
EST. AVERAGE GLUCOSE BLD GHB EST-MCNC: 134 MG/DL
GLUCOSE 1H P 100 G GLC PO SERPL-MCNC: 217 MG/DL (ref 65–179)
GLUCOSE 2H P 100 G GLC PO SERPL-MCNC: 204 MG/DL (ref 65–154)
GLUCOSE 3H P 100 G GLC PO SERPL-MCNC: 155 MG/DL (ref 65–139)
GLUCOSE P FAST SERPL-MCNC: 129 MG/DL (ref 65–94)
HBA1C MFR BLD: 6.3 %
HCT VFR BLD AUTO: 32.8 % (ref 34.8–46.1)
HGB BLD-MCNC: 10.9 G/DL (ref 11.5–15.4)
IMM GRANULOCYTES # BLD AUTO: 0.14 THOUSAND/UL (ref 0–0.2)
IMM GRANULOCYTES NFR BLD AUTO: 1 % (ref 0–2)
LYMPHOCYTES # BLD AUTO: 1.57 THOUSANDS/ÂΜL (ref 0.6–4.47)
LYMPHOCYTES NFR BLD AUTO: 15 % (ref 14–44)
MCH RBC QN AUTO: 28.9 PG (ref 26.8–34.3)
MCHC RBC AUTO-ENTMCNC: 33.2 G/DL (ref 31.4–37.4)
MCV RBC AUTO: 87 FL (ref 82–98)
MONOCYTES # BLD AUTO: 0.47 THOUSAND/ÂΜL (ref 0.17–1.22)
MONOCYTES NFR BLD AUTO: 5 % (ref 4–12)
NEUTROPHILS # BLD AUTO: 8.2 THOUSANDS/ÂΜL (ref 1.85–7.62)
NEUTS SEG NFR BLD AUTO: 78 % (ref 43–75)
NRBC BLD AUTO-RTO: 0 /100 WBCS
PLATELET # BLD AUTO: 227 THOUSANDS/UL (ref 149–390)
PMV BLD AUTO: 9.7 FL (ref 8.9–12.7)
RBC # BLD AUTO: 3.77 MILLION/UL (ref 3.81–5.12)
TSH SERPL DL<=0.05 MIU/L-ACNC: 0.77 UIU/ML (ref 0.45–4.5)
WBC # BLD AUTO: 10.52 THOUSAND/UL (ref 4.31–10.16)

## 2025-03-31 PROCEDURE — 85025 COMPLETE CBC W/AUTO DIFF WBC: CPT

## 2025-03-31 PROCEDURE — 82951 GLUCOSE TOLERANCE TEST (GTT): CPT

## 2025-03-31 PROCEDURE — 82952 GTT-ADDED SAMPLES: CPT

## 2025-03-31 PROCEDURE — 36415 COLL VENOUS BLD VENIPUNCTURE: CPT

## 2025-03-31 PROCEDURE — 86780 TREPONEMA PALLIDUM: CPT

## 2025-04-01 ENCOUNTER — TELEPHONE (OUTPATIENT)
Dept: OBGYN CLINIC | Facility: MEDICAL CENTER | Age: 35
End: 2025-04-01

## 2025-04-01 ENCOUNTER — RESULTS FOLLOW-UP (OUTPATIENT)
Dept: LABOR AND DELIVERY | Facility: HOSPITAL | Age: 35
End: 2025-04-01

## 2025-04-01 DIAGNOSIS — Z3A.30 30 WEEKS GESTATION OF PREGNANCY: ICD-10-CM

## 2025-04-01 DIAGNOSIS — Z34.83 MULTIGRAVIDA IN THIRD TRIMESTER: ICD-10-CM

## 2025-04-01 DIAGNOSIS — O24.419 GESTATIONAL DIABETES MELLITUS (GDM) IN THIRD TRIMESTER, GESTATIONAL DIABETES METHOD OF CONTROL UNSPECIFIED: Primary | ICD-10-CM

## 2025-04-01 LAB — TREPONEMA PALLIDUM IGG+IGM AB [PRESENCE] IN SERUM OR PLASMA BY IMMUNOASSAY: NORMAL

## 2025-04-01 RX ORDER — FERROUS SULFATE 324(65)MG
324 TABLET, DELAYED RELEASE (ENTERIC COATED) ORAL EVERY OTHER DAY
Qty: 90 TABLET | Refills: 0 | Status: SHIPPED | OUTPATIENT
Start: 2025-04-01

## 2025-04-01 NOTE — RESULT ENCOUNTER NOTE
Please inform patient  3 hr gtt  confirms gestational  diabetes   Referral to MFM needed for  diabetic education thanks

## 2025-04-01 NOTE — TELEPHONE ENCOUNTER
3RD TRIMESTER CHECK-IN CALL      Overall how are you feeling? Patient states she is doing well.    Compliant with routine OB appointments? Yes    Have you completed your 3rd trimester lab work? Yes    Have you reviewed the contents of 3rd trimester folder from office?  Yes    Have you decided on a pediatrician? Yes     If yes, who:   Dr Antunez    Questions on paperwork to go back to office? No    Questions on the baby birth certificate forms? No    Sent link for the Hospital Readiness Video via [employer]   Discussed need for DIP - referral placed today

## 2025-04-02 ENCOUNTER — TELEPHONE (OUTPATIENT)
Age: 35
End: 2025-04-02

## 2025-04-03 ENCOUNTER — TELEPHONE (OUTPATIENT)
Age: 35
End: 2025-04-03

## 2025-04-03 ENCOUNTER — ROUTINE PRENATAL (OUTPATIENT)
Dept: OBGYN CLINIC | Facility: CLINIC | Age: 35
End: 2025-04-03

## 2025-04-03 VITALS — SYSTOLIC BLOOD PRESSURE: 120 MMHG | DIASTOLIC BLOOD PRESSURE: 76 MMHG | WEIGHT: 233 LBS | BODY MASS INDEX: 39.99 KG/M2

## 2025-04-03 DIAGNOSIS — Z3A.30 30 WEEKS GESTATION OF PREGNANCY: Primary | ICD-10-CM

## 2025-04-03 DIAGNOSIS — O26.843 UTERINE SIZE DATE DISCREPANCY PREGNANCY, THIRD TRIMESTER: ICD-10-CM

## 2025-04-03 DIAGNOSIS — O24.410 DIET CONTROLLED GESTATIONAL DIABETES MELLITUS (GDM) IN THIRD TRIMESTER: ICD-10-CM

## 2025-04-03 DIAGNOSIS — O09.292 HISTORY OF PRE-ECLAMPSIA IN PRIOR PREGNANCY, CURRENTLY PREGNANT IN SECOND TRIMESTER: ICD-10-CM

## 2025-04-03 PROCEDURE — PNV: Performed by: OBSTETRICS & GYNECOLOGY

## 2025-04-03 NOTE — PROGRESS NOTES
Name: Ciera Maria      : 1990      MRN: [id number]  Encounter Provider: Yoli Beth MD  Encounter Date: 4/3/2025   Encounter department: [facility] [place]  :  Assessment & Plan  History of pre-eclampsia in prior pregnancy, currently pregnant in second trimester       taking ASA until 36 weeks   Diet controlled gestational diabetes mellitus (GDM) in third trimester    Lab Results   Component Value Date    HGBA1C 6.3 (H) 2025          failed  3 hr gtt    Will be calling [facility] to  set up education and follow  up  Note from [facility] in February stated if  abnormal 3hr gtt would need fetal echo  - will call [facility] to set this up   30 weeks gestation of pregnancy       FKC and  PTL  precautions reviewed    Uterine size date discrepancy pregnancy, third trimester       measuring  33 weeks    Has follow  up scan scheduled at [facility] already         History of Present Illness   HPI  Ciera Maria is a 35 y.o. female who presents for routine prenatal  care , having [place] H[place] contractions , no  LOF or  VB   +FM   New diagnosis  of  GDM         Review of Systems   All other systems reviewed and are negative.         Objective   /76   Wt 106 kg (233 lb)   LMP 2024   BMI 39.99 kg/m²      Physical Exam  Vitals reviewed.   Constitutional:       Appearance: Normal appearance.   HENT:      Head: Normocephalic and atraumatic.      Nose: Nose normal.   Eyes:      Conjunctiva/sclera: Conjunctivae normal.   Pulmonary:      Effort: Pulmonary effort is normal.   Abdominal:      Palpations: Abdomen is soft.      Comments: Gravid  , non tender   Measuring  33 cm at  30 weeks gestation     Neurological:      General: No focal deficit present.      Mental Status: She is alert and oriented to person, place, and time.   Psychiatric:         Mood and Affect: Mood normal.         Behavior: Behavior normal.

## 2025-04-03 NOTE — ASSESSMENT & PLAN NOTE
Lab Results   Component Value Date    HGBA1C 6.3 (H) 03/31/2025          failed  3 hr gtt    Will be calling [facility] to  set up education and follow  up  Note from M in February stated if  abnormal 3hr gtt would need fetal echo  - will call [facility] to set this up

## 2025-04-04 ENCOUNTER — TELEMEDICINE (OUTPATIENT)
Dept: PERINATAL CARE | Facility: CLINIC | Age: 35
End: 2025-04-04
Payer: COMMERCIAL

## 2025-04-04 DIAGNOSIS — Z34.83 MULTIGRAVIDA IN THIRD TRIMESTER: ICD-10-CM

## 2025-04-04 DIAGNOSIS — O24.419 GESTATIONAL DIABETES MELLITUS (GDM) IN THIRD TRIMESTER, GESTATIONAL DIABETES METHOD OF CONTROL UNSPECIFIED: Primary | ICD-10-CM

## 2025-04-04 DIAGNOSIS — Z3A.30 30 WEEKS GESTATION OF PREGNANCY: ICD-10-CM

## 2025-04-04 PROCEDURE — G0108 DIAB MANAGE TRN  PER INDIV: HCPCS

## 2025-04-04 RX ORDER — BLOOD-GLUCOSE METER
EACH MISCELLANEOUS
Qty: 1 KIT | Refills: 0 | Status: SHIPPED | OUTPATIENT
Start: 2025-04-04 | End: 2025-06-10

## 2025-04-04 RX ORDER — LANCETS
EACH MISCELLANEOUS
Qty: 100 EACH | Refills: 5 | Status: SHIPPED | OUTPATIENT
Start: 2025-04-04 | End: 2025-06-10

## 2025-04-04 NOTE — PROGRESS NOTES
CLASS 1   The Diabetes and Pregnancy Program  Individual  virtual visit    Ciera Maria is a 35 y.o. female who presents today unaccompanied for Virtual Regular Visit, Patient Education, and Gestational Diabetes Patient is at 30w3d gestation, Estimated Date of Delivery: 6/10/25.     Self-Assessment Questionnaire: completed via [employer]    Additional questionnaire responses reported throughout the following note. Reviewed and updated the following from patients medical record: Demographics, Education, Occupation, PMH, Allergies, and Current Medications.     Problem List Items Addressed This Visit    None  Visit Diagnoses         Gestational diabetes mellitus (GDM) in third trimester, gestational diabetes method of control unspecified    -  Primary    Relevant Orders    [employer] glucose flowsheet      30 weeks gestation of pregnancy        Relevant Orders    [employer] glucose flowsheet      Multigravida in third trimester        Relevant Orders    [employer] glucose flowsheet          Discussed:   - Pathophysiology of Gestational diabetes mellitus (GDM) in third trimester, gestational diabetes method of control unspecified [O24.419].  - Untreated hyperglycemia in pregnancy and maternal fetal complications (fetal macrosomia,  hypoglycemia, polyhydramnios, increased incidence of  section,  labor, and in severe cases fetal demise and still birth).   - Importance of blood glucose monitoring, nutrition, and medication if necessary in achieving BG goals.     LABS  Lab Results   Component Value Date/Time    GFO6BXDY95LY 141 (H) 2024 10:29 AM      Lab Results   Component Value Date/Time    GLUF 129 (H) 2025 09:24 AM    GLUF 86 2014 01:05 PM    ALGCBIL6CL 217 (H) 2025 10:31 AM    BBMMRMA9OV 204 (H) 2025 11:30 AM    EHPWRRO3AQ 155 (H) 2025 12:32 PM      Lab Results   Component Value Date/Time    HGBA1C 6.3 (H) 2025 09:24 AM    HGBA1C 5.6 2024 09:34 AM     "HGBA1C 5.5 08/26/2023 11:15 AM    HGBA1C 5.3 08/01/2015 11:25 AM        MEDICATIONS  Diabetic Medications: None    ANTHROPOMETRICS  Weight Gain in Pregnancy:  Current TWG (12.2 kg (27 lb)) compared to recommended TWG (5 kg (11 lb)-9 kg (19 lb)): Exceeding -- Recommended to maintain wt for remainder of pregnancy    Pre-Pregnancy:   Pre-Gravid Wt: 93.4 kg (206 lb)  Pre-Gravid BMI: 35.34    Current:  Ht Readings from Last 1 Encounters:   02/11/25 5' 4\" (1.626 m)      Wt Readings from Last 3 Encounters:   04/03/25 106 kg (233 lb)   03/14/25 105 kg (230 lb 9.6 oz)   02/13/25 102 kg (225 lb 8 oz)      Current BMI: There is no height or weight on file to calculate BMI.    RECENT ULTRASOUND RESULTS  Findings: NML Growth/SHOLA  See US note for additional details.     BLOOD GLUCOSE MONITORING  Ordered Glucometer, Testing Strips, and Lancets  Meter Teaching: Gave instruction on site selection, skin preparation, loading strips and lancet device, meter activation, obtaining blood sample, test strip and lancet disposal and storage, and recording log book entries.   - Frequency: 4 x per day (Fasting, 2 hour after start of each meal)  - Goals: (Fasting) 60-95mg/dL // (2hr PP) <120mg/dL  - Reporting: Weekly via [employer] Glucose Flowsheet     DIET   Meal Plan Recommendations:  - Daily Calories/Carbohydrate/Protein: 2000 calorie (CHO: 45-15-60-15-60-30) (PRO: 2/3-1-3/4-1-3/4-2)   - Appropriate amounts of CHO, PRO, and Fat at each meal and snack.   - CHO exchange list, and portion sizes for both CHO and PRO  - How to read a food label  - Suggested meal/snack options to increase nutrition and maintain consistent meal and snack intakes  - Eat every 2.0-3.5 hours while awake  - Go no longer than 8-10 hours fasting overnight until first meal of the day.     PHYSICAL ACTIVITY  Currently physically active?  Intentional 1-2x a week    - Discussed benefits of physical activity to optimize blood glucose control, encouraged activity at patient is " "physically able.   - Instructed pt to always consult a physician prior to starting an exercise program.   - Recommend 20-30 minutes daily.     Patient Stated Goal: \"I will walk 10 minutes after each meal daily\"  Expected Compliance: good  Barriers to Learning/Change: No Barriers  Diabetes Self Management Support Plan (outside of ongoing care): Spouse/Family     Follow-up: Return in 1 week (on 2025) for Class 2 w/ Sarah Wiley RD.   - Patient instructed to bring 3 day food diary and/or write down foods associated with elevated after meal blood sugars  - Call ([phone]) with any questions or concerns.    Start Time: 9:00am  End Time: 10:00am    Sarah Wiley RD   Diabetes Educator  [facility]etal [facility]  Diabetes and Pregnancy Program  [address] [place] [address], [address]  [place] [address]    Virtual Regular VisitName: Ciera Maria      : 1990      MRN: [id number]  Encounter Provider: Sarah Wiley RD  Encounter Date: 2025   Encounter department: [facility]  [facility] [place]  :  Assessment & Plan  Gestational diabetes mellitus (GDM) in third trimester, gestational diabetes method of control unspecified    Lab Results   Component Value Date    HGBA1C 6.3 (H) 2025       Orders:    Ambulatory Referral to Maternal Fetal Medicine    [employer] glucose flowsheet    30 weeks gestation of pregnancy    Orders:    Ambulatory Referral to Maternal Fetal Medicine    [employer] glucose flowsheet    Multigravida in third trimester    Orders:    Ambulatory Referral to Maternal Fetal Medicine    [employer] glucose flowsheet    Gestational diabetes mellitus (GDM) in third trimester, gestational diabetes method of control unspecified    Lab Results   Component Value Date    HGBA1C 6.3 (H) 2025          30 weeks gestation of pregnancy         Multigravida in third trimester               History of Present Illness     HPI  Review of Systems    Objective   LMP 2024     Physical " Exam    Administrative Statements   Encounter provider Sarah Wiley RD    The Patient is located at Home and in the following state in which I hold an active license [place].    The patient was identified by name and date of birth. Ciear Chen Maria was informed that this is a telemedicine visit and that the visit is being conducted through the Epic Embedded platform. She agrees to proceed..  My office door was closed. No one else was in the room.  She acknowledged consent and understanding of privacy and security of the video platform. The patient has agreed to participate and understands they can discontinue the visit at any time.    I have spent a total time of 60 minutes in caring for this patient on the day of the visit/encounter

## 2025-04-04 NOTE — PATIENT INSTRUCTIONS
CLASS 1   The Diabetes and Pregnancy Program   [facility] - Maternal Fetal Medicine    Diabetes Team  - GUILLERMO Edwards CDE   - Sarah (Suzanne Wiley RD MS  - Lana Parr RD CDE MPH  - Kristy Laguna RDN, LDN    Contact  - Reply to a previous message from the diabetes team  - Send a message to Adela Argueta  - Call [phone] (recommended if high priority)    Gestational Diabetes    CHECKING BLOOD SUGARS  Carry your meter and testing supplies with you at all times. Bring your glucose meter to all your appointments in our [facility] office.     Your prescription for a glucometer, test strips, and lancets  A request for a glucose meter, test strips and lancets was sent to the provider for approval. Once your prescriptions are approved by the provider, your prescription will be sent electronically to your pharmacy.     The provider is seeing patients in the office today so orders allow time for your prescriptions to be approved. We recommend calling your pharmacy to verify your medication was received electronically and is ready for pick-up before going to pharmacy.     Issues with insurance coverage?   Check your formulary on your insurance website or call the number on your insurance card to determine the preferred brand glucometer, testing strips, and lancets. Some examples of brands include One Touch, Contour, Accuchek, Freestyle [place]). Notify the diabetes team of your preferred brand and a new order will be placed.    Need to purchase over the counter?   We recommend either the [employer] ReliOn Prime or [facility] Advanced. Please notify the diabetes team if you will be using one of these meters.    Check out online coupons for testing strips:  - One Touch Verio: RX Finder  Automatic Savings for Diabetes Supplies  OneTouch®   - Contour Next: Contour® NEXT Test Strips Discounts  [facility]     How to Check Blood Sugars:  1) Wash your hands. We recommend unscented soap and water to avoid  "potentially inaccurate readings.   Alcohol can cause false elevated glucose readings if not fully dried and may dry your skin with continued use.  2) Gather your glucose meter kit and supplies.  3) Prepare your meter by inserting a new test strip. This will automatically turn on your meter. Use a new test strip each time. Avoid using  testing strips.  4) Prepare your lancing device by inserting the lancet. After the lancet is securely in place, twist off the top to reveal needle. Reattach lancing device top.  5) Once lancing device top is reattached, you are now ready to prick the side of your fingertip to get a blood sample. You can adjust the depth setting as needed. We recommend to start at \"4\".   6) Apply the blood sample to test strip according to instructions.   7) Properly dispose of your lancet. Use a new lancet every time. Make sure your sharp container is: heavy duty plastic, puncture-resistant lid, upright and stable, leak resistant, properly labeled.   8) Record your blood sugar      Instructions   One Touch Verio: OneTouch Verio Flex®  Blood Glucose Meter  OneTouch®   Contour Next: Instructional Videos - [facility]     Frequency: 4 Times Daily     Timing:   Fasting   Test when you wake up before you have anything to eat or drink  At least 8hrs but no more than 10hrs from your bedtime snack  Exceeding 10hrs may result in inaccurate readings  2 hrs after the first bite of your meal (breakfast, lunch, dinner) **do not test for snacks    Goals:    Fasting  60-95   1 Hour   After Meals <140   2 Hours   After Meals <120   *please inform member of diabetes team if you begin testing 1hr blood sugars    REPORTING BLOOD SUGARS:   Please only pick ONE way to report to our team. Choose the best option for you.     [facility] Blood Glucose Flow sheet under \"Track my Health\"   Remember to click \"save\" for your readings to automatically upload into Epic.   [facility] Message with " Attachment(s)  Send a picture of a written blood sugar log   To: GUILLERMO Edwards (Medical Question - Non Urgent)  You may include up to 3 images per message  Leave Voicemail at [phone]   Include: Name, Date of Birth, and Date + Blood Sugars    The diabetes team will review your blood sugar log weekly till delivery.             MEAL PLAN AND DIET INSTRUCTIONS    *Carbohydrates: Sources of food that increase your blood sugar (include: starches, fruits, milk, desserts, starchy vegetables such as corn, peas, squash)    Meal Plan (3 Meals and 3 Snacks)  Outlines grams of carbohydrates to have at each meal and snack  Minimum Carbohydrate Intake Daily (avoid ketosis): 175g *to be distributed throughout day as instructed in meal plan  Include Protein at Every Meal and Snack  Eat all 3 meals and 3 snacks  Eating every 2 to 3.5 hours during the day.   Preferably at the same times every day.   Avoid going long periods of time without eating.        Be sure to have bedtime snack that includes at least 30 grams of carbohydrates and 2 ounces (14 grams) of protein.  Refer to Food Lists in Booklet (pages 15-17)   Each food listed is equal to 15g (1 Carbohydrate Serving)  Read Food Label   Check Total Carbohydrate  15g = 1 Carbohydrate Serving  Check Serving Size!   The total carbohydrate amount listed is based on 1 serving size  Be careful as many items contain more than one serving per package  Check Total Sugars  Choose items with <15g per serving of total sugar    Exercise:  If ok by your OB try adding a 20-30 minute walk after dinner to help keep fasting blood sugar within range.  Try incorporating at least 10 minutes of walking after each meal  Resource: Exercise During Pregnancy  ACOG     Additional Information: (to be reviewed at class 2)  Continue to follow up with your OB and MFM providers as recommended.  Always have glucose available for hypoglycemia, use 15 by 15 rule. (Page 29 in booklet)  While sick or feeling  ill, follow our sick day guidelines in our GDM booklet. (Page 28 in booklet)  For travel and dining out tips refer to your GDM booklet. See attachment (Page 31 in booklet)    Class 2 Information: Approximately 1 week following Class 1  Bring 3 Day Food Log (everything you eat and drink for each meal and snack) or write down meals associated with elevated after meal blood sugars  Will review diet more in depth and provide feedback     Remember: Importance of maintaining tight control of blood sugars during pregnancy = decrease risk factors including fetal macrosomia; birth injury; risk of ; polyhydramnios; pre-term labor; pre-eclampsia;  hypoglycemia; jaundice and stillbirth.    Any questions give us a call at [phone] or send us a [employer] message to GUILLERMO Edwards.     Sarah Wiley RD  Diabetes Educator   The Diabetes and Pregnancy Program  At [facility] - Maternal Fetal Medicine     Thank you for choosing us for your  care today.  If you have any questions about your ultrasound or care, please do not hesitate to contact us or your primary obstetrician.        Some general instructions for your pregnancy are:    Exercise: Aim for 150 minutes per week of regular exercise.  Walking is great!  Nutrition: Choose healthy sources of calcium, iron, and protein.  Avoid ultraprocessed foods and added sugar.  Learn about Preeclampsia: preeclampsia is a common, potentially serious high blood pressure complication in pregnancy.  A blood pressure of 140mmHg (systolic or top number) or 90mmHg (diastolic or bottom number) should be evaluated by your doctor.  Aspirin is sometimes prescribed in early pregnancy to prevent preeclampsia in women with risk factors - ask your obstetrician if you should be on this medication.  For more resources, visit:  https://www.highriskpregnancyinfo.org/preeclampsia  If you smoke, please try to quit completely but also try to reduce your smoking by  as much as possible (as soon as possible).  Do not vape.  Please also avoid cannabis products.  Other warning signs to watch out for in pregnancy or postpartum: chest pain, obstructed breathing or shortness of breath, seizures, thoughts of hurting yourself or your baby, bleeding, a painful or swollen leg, fever, or headache (see [facility] POST-BIRTH Warning Signs campaign).  If these happen call 911.  Itching is also not normal in pregnancy and if you experience this, especially over your hands and feet, potentially worse at night, notify your doctors.

## 2025-04-07 ENCOUNTER — ROUTINE PRENATAL (OUTPATIENT)
Dept: PERINATAL CARE | Facility: OTHER | Age: 35
End: 2025-04-07
Payer: COMMERCIAL

## 2025-04-07 VITALS
DIASTOLIC BLOOD PRESSURE: 54 MMHG | HEIGHT: 64 IN | HEART RATE: 87 BPM | SYSTOLIC BLOOD PRESSURE: 98 MMHG | BODY MASS INDEX: 39.98 KG/M2 | WEIGHT: 234.2 LBS

## 2025-04-07 DIAGNOSIS — Z3A.31 31 WEEKS GESTATION OF PREGNANCY: Primary | ICD-10-CM

## 2025-04-07 DIAGNOSIS — O24.410 DIET CONTROLLED GESTATIONAL DIABETES MELLITUS (GDM) IN THIRD TRIMESTER: ICD-10-CM

## 2025-04-07 PROCEDURE — 76825 ECHO EXAM OF FETAL HEART: CPT | Performed by: OBSTETRICS & GYNECOLOGY

## 2025-04-07 PROCEDURE — 76827 ECHO EXAM OF FETAL HEART: CPT | Performed by: OBSTETRICS & GYNECOLOGY

## 2025-04-07 PROCEDURE — 93325 DOPPLER ECHO COLOR FLOW MAPG: CPT | Performed by: OBSTETRICS & GYNECOLOGY

## 2025-04-07 NOTE — PROGRESS NOTES
Please refer to the [facility] ultrasound report in Ob Procedures for additional information regarding today's visit

## 2025-04-07 NOTE — LETTER
April 9, 2025     Yoli Beth MD  [address] [place] [address]  [address] [place] [address]    Patient: Ciera Maria   YOB: 1990   Date of Visit: 4/7/2025       Dear Dr. Yoli Beth MD:    Thank you for referring Ciera Maria to me for evaluation. Below are my notes for this consultation.    If you have questions, please do not hesitate to call me. I look forward to following your patient along with you.         Sincerely,        Frederic Rosenberg MD        CC: No Recipients    Frederic Rosenberg MD  4/7/2025  6:00 PM  Sign when Signing Visit  Please refer to the [facility] ultrasound report in Ob Procedures for additional information regarding today's visit

## 2025-04-15 NOTE — PATIENT INSTRUCTIONS
Thank you for choosing us for your  care today.  If you have any questions about your ultrasound or care, please do not hesitate to contact us or your primary obstetrician.        Some general instructions for your pregnancy are:    Exercise: Aim for 150 minutes per week of regular exercise.  Walking is great!  Nutrition: Choose healthy sources of calcium, iron, and protein.  Avoid ultraprocessed foods and added sugar.  Learn about Preeclampsia: preeclampsia is a common, potentially serious high blood pressure complication in pregnancy.  A blood pressure of 140mmHg (systolic or top number) or 90mmHg (diastolic or bottom number) should be evaluated by your doctor.  Aspirin is sometimes prescribed in early pregnancy to prevent preeclampsia in women with risk factors - ask your obstetrician if you should be on this medication.  For more resources, visit:  https://www.highriskpregnancyinfo.org/preeclampsia  If you smoke, please try to quit completely but also try to reduce your smoking by as much as possible (as soon as possible).  Do not vape.  Please also avoid cannabis products.  Other warning signs to watch out for in pregnancy or postpartum: chest pain, obstructed breathing or shortness of breath, seizures, thoughts of hurting yourself or your baby, bleeding, a painful or swollen leg, fever, or headache (see [facility] POST-BIRTH Warning Signs campaign).  If these happen call 911.  Itching is also not normal in pregnancy and if you experience this, especially over your hands and feet, potentially worse at night, notify your doctors.     Kick Counts in Pregnancy   AMBULATORY CARE:   Kick counts  measure how much your baby is moving in your womb. A kick from your baby can be felt as a twist, turn, swish, roll, or jab. It is common to feel your baby kicking at 26 to 28 weeks of pregnancy. You may feel your baby kick as early as 20 weeks of pregnancy. You may want to start counting at 28 weeks.   Contact your  doctor immediately if:   You feel a change in the number of kicks or movements of your baby.      You feel fewer than 10 kicks within 2 hours.      You have questions or concerns about your baby's movements.     Why measure kick counts:  Your baby's movement may provide information about your baby's health. He or she may move less, or not at all, if there are problems. Your baby may move less if he or she is not getting enough oxygen or nutrition from the placenta. Do not smoke while you are pregnant. Smoking decreases the amount of oxygen that gets to your baby. Talk to your healthcare provider if you need help to quit smoking. Tell your healthcare provider as soon as you feel a change in your baby's movements.  When to measure kick counts:   Measure kick counts at the same time every day.       Measure kick counts when your baby is awake and most active. Your baby may be most active in the evening.     How to measure kick counts:  Check that your baby is awake before you measure kick counts. You can wake up your baby by lightly pushing on your belly, walking, or drinking something cold. Your healthcare provider may tell you different ways to measure kick counts. You may be told to do the following:  Use a chart or clock to keep track of the time you start and finish counting.      Sit in a chair or lie on your left side.      Place your hands on the largest part of your belly.      Count until you reach 10 kicks. Write down how much time it takes to count 10 kicks.      It may take 30 minutes to 2 hours to count 10 kicks. It should not take more than 2 hours to count 10 kicks.     Follow up with your doctor as directed:  Write down your questions so you remember to ask them during your visits.   © Copyright [employer] 2023 Information is for End User's use only and may not be sold, redistributed or otherwise used for commercial purposes.  The above information is an  only. It is not intended as  medical advice for individual conditions or treatments. Talk to your doctor, nurse or pharmacist before following any medical regimen to see if it is safe and effective for you. Nonstress Test for Pregnancy   WHAT YOU NEED TO KNOW:   What do I need to know about a nonstress test?  A nonstress test measures your baby's heart rate and movements. Nonstress means that no stress will be placed on your baby during the test.  How do I prepare for a nonstress test?  Your healthcare provider will talk to you about how to prepare for this test. Your provider may tell you to eat and drink plenty of liquids before your test. If you smoke, you may be asked not to smoke within 2 hours before the test. Your provider will also tell you which medicines to take or not take on the day of your test.  What will happen during a nonstress test?  You may be asked to lie down or recline back for the test on a bed. One or 2 belts with sensors will be placed around your abdomen. Your baby's heart rate will be recorded with a machine. If your baby does not move, your baby may be asleep. Your healthcare provider may make a noise near your abdomen to try to wake your baby. The test usually takes about 20 minutes, but can take longer if your baby needs to be awakened.        What do I need to know about the test results?  Your baby will be expected to move at least 2 times for a certain amount of time. Your baby's heart rate will be expected to go up by a certain number of beats per minute during movement. If your baby does not move as expected, the test may need to be repeated or you may need other tests.  CARE AGREEMENT:   You have the right to help plan your care. Learn about your health condition and how it may be treated. Discuss treatment options with your healthcare providers to decide what care you want to receive. You always have the right to refuse treatment. The above information is an  only. It is not intended as  medical advice for individual conditions or treatments. Talk to your doctor, nurse or pharmacist before following any medical regimen to see if it is safe and effective for you.  © 2023 Information is for End User's use only and may not be sold, redistributed or otherwise used for commercial purposes. Thank you for choosing us for your  care today.  If you have any questions about your ultrasound or care, please do not hesitate to contact us or your primary obstetrician.        Some general instructions for your pregnancy are:    Exercise: Aim for 150 minutes per week of regular exercise.  Walking is great!  Nutrition: Choose healthy sources of calcium, iron, and protein.  Avoid ultraprocessed foods and added sugar.  Learn about Preeclampsia: preeclampsia is a common, potentially serious high blood pressure complication in pregnancy.  A blood pressure of 140mmHg (systolic or top number) or 90mmHg (diastolic or bottom number) should be evaluated by your doctor.  Aspirin is sometimes prescribed in early pregnancy to prevent preeclampsia in women with risk factors - ask your obstetrician if you should be on this medication.  For more resources, visit:  https://www.highriskpregnancyinfo.org/preeclampsia  If you smoke, please try to quit completely but also try to reduce your smoking by as much as possible (as soon as possible).  Do not vape.  Please also avoid cannabis products.  Other warning signs to watch out for in pregnancy or postpartum: chest pain, obstructed breathing or shortness of breath, seizures, thoughts of hurting yourself or your baby, bleeding, a painful or swollen leg, fever, or headache (see [facility] POST-BIRTH Warning Signs campaign).  If these happen call 911.  Itching is also not normal in pregnancy and if you experience this, especially over your hands and feet, potentially worse at night, notify your doctors.

## 2025-04-16 ENCOUNTER — ULTRASOUND (OUTPATIENT)
Dept: PERINATAL CARE | Facility: CLINIC | Age: 35
End: 2025-04-16
Payer: COMMERCIAL

## 2025-04-16 VITALS
HEIGHT: 64 IN | HEART RATE: 68 BPM | DIASTOLIC BLOOD PRESSURE: 68 MMHG | BODY MASS INDEX: 39.67 KG/M2 | OXYGEN SATURATION: 98 % | WEIGHT: 232.4 LBS | SYSTOLIC BLOOD PRESSURE: 122 MMHG

## 2025-04-16 DIAGNOSIS — O09.292 HISTORY OF PRE-ECLAMPSIA IN PRIOR PREGNANCY, CURRENTLY PREGNANT IN SECOND TRIMESTER: ICD-10-CM

## 2025-04-16 DIAGNOSIS — Z3A.32 32 WEEKS GESTATION OF PREGNANCY: Primary | ICD-10-CM

## 2025-04-16 DIAGNOSIS — O36.63X0 MACROSOMIA OF FETUS AFFECTING MANAGEMENT OF MOTHER IN THIRD TRIMESTER, SINGLE OR UNSPECIFIED FETUS: ICD-10-CM

## 2025-04-16 DIAGNOSIS — O99.213 OBESITY AFFECTING PREGNANCY IN THIRD TRIMESTER, UNSPECIFIED OBESITY TYPE: ICD-10-CM

## 2025-04-16 DIAGNOSIS — O24.419 GESTATIONAL DIABETES MELLITUS (GDM) IN THIRD TRIMESTER, GESTATIONAL DIABETES METHOD OF CONTROL UNSPECIFIED: ICD-10-CM

## 2025-04-16 DIAGNOSIS — Z36.89 ENCOUNTER FOR ULTRASOUND TO CHECK FETAL GROWTH: ICD-10-CM

## 2025-04-16 PROCEDURE — 59025 FETAL NON-STRESS TEST: CPT | Performed by: STUDENT IN AN ORGANIZED HEALTH CARE EDUCATION/TRAINING PROGRAM

## 2025-04-16 PROCEDURE — 99214 OFFICE O/P EST MOD 30 MIN: CPT | Performed by: STUDENT IN AN ORGANIZED HEALTH CARE EDUCATION/TRAINING PROGRAM

## 2025-04-16 PROCEDURE — 76816 OB US FOLLOW-UP PER FETUS: CPT | Performed by: STUDENT IN AN ORGANIZED HEALTH CARE EDUCATION/TRAINING PROGRAM

## 2025-04-16 NOTE — LETTER
2025     Sarah Wiley RD    Patient: Ciera Maria   YOB: 1990   Date of Visit: 2025       Dear Sarah Wiley RD:    Thank you for referring Ciera Maria to me for evaluation. Below are my notes for this consultation.    She has GMDA1. Her control is poor. She would benefit from insulin. She has an appointment with you on . I recommend this appointment include insulin teaching/start. She is open to insulin and used it in a prior pregnancy.    If you have questions, please do not hesitate to call me. I look forward to following your patient along with you.         Sincerely,        Leanna Meehan MD        CC: No Recipients    Leanna Meehan MD  2025  9:45 AM  Sign when Signing Visit  [facility]: Ms. Maria was seen today for NST (found under the pregnancy episode) which I reviewed the RN assessment and agree, and fetal growth ultrasound (see ultrasound report under OB procedures tab).         MDM:   I. Diagnoses/Problems addressed:  Low  II.  Data: Moderate  III.  Risk of morbidity: Low    Please don't hesitate to contact our office with any concerns or questions.  -Leanna Meehan MD

## 2025-04-16 NOTE — PROGRESS NOTES
[facility]: Ms. Maria was seen today for NST (found under the pregnancy episode) which I reviewed the RN assessment and agree, and fetal growth ultrasound (see ultrasound report under OB procedures tab).         MDM:   I. Diagnoses/Problems addressed:  Low  II.  Data: Moderate  III.  Risk of morbidity: Low    Please don't hesitate to contact our office with any concerns or questions.  -Leanna Meehan MD

## 2025-04-16 NOTE — PROGRESS NOTES
Non-Stress Testing:    Non-Stress test, equipment, procedure, and expected outcomes explained. Reviewed fetal kick counts and when to call OB.Verified patient understanding of fetal kick counts with teach back method. Patient reports feeling daily fetal movements. Patient has no questions or concerns.     Reviewed non-stress test with Dr. Meehan in-person

## 2025-04-17 ENCOUNTER — ROUTINE PRENATAL (OUTPATIENT)
Dept: OBGYN CLINIC | Facility: MEDICAL CENTER | Age: 35
End: 2025-04-17

## 2025-04-17 VITALS — WEIGHT: 234.9 LBS | BODY MASS INDEX: 40.32 KG/M2 | DIASTOLIC BLOOD PRESSURE: 70 MMHG | SYSTOLIC BLOOD PRESSURE: 120 MMHG

## 2025-04-17 DIAGNOSIS — O09.292 HISTORY OF PRE-ECLAMPSIA IN PRIOR PREGNANCY, CURRENTLY PREGNANT IN SECOND TRIMESTER: ICD-10-CM

## 2025-04-17 DIAGNOSIS — O36.63X0 MACROSOMIA OF FETUS AFFECTING MANAGEMENT OF MOTHER IN THIRD TRIMESTER, SINGLE OR UNSPECIFIED FETUS: ICD-10-CM

## 2025-04-17 DIAGNOSIS — O24.410 DIET CONTROLLED GESTATIONAL DIABETES MELLITUS (GDM) IN THIRD TRIMESTER: Primary | ICD-10-CM

## 2025-04-17 DIAGNOSIS — Z86.32 HISTORY OF GESTATIONAL DIABETES IN PRIOR PREGNANCY, CURRENTLY PREGNANT IN SECOND TRIMESTER: ICD-10-CM

## 2025-04-17 DIAGNOSIS — O09.292 HISTORY OF GESTATIONAL DIABETES IN PRIOR PREGNANCY, CURRENTLY PREGNANT IN SECOND TRIMESTER: ICD-10-CM

## 2025-04-17 DIAGNOSIS — Z3A.32 32 WEEKS GESTATION OF PREGNANCY: ICD-10-CM

## 2025-04-17 PROCEDURE — PNV: Performed by: STUDENT IN AN ORGANIZED HEALTH CARE EDUCATION/TRAINING PROGRAM

## 2025-04-17 NOTE — ASSESSMENT & PLAN NOTE
- Needs to start insulin based on hyperglycemia, has follow up appointment tomorrow    Lab Results   Component Value Date    HGBA1C 6.3 (H) 03/31/2025

## 2025-04-17 NOTE — PATIENT INSTRUCTIONS
INSULIN EDUCATION    Thank you for attending our insulin education class.     For a quick recap on what you learned today you can review the [employer] educational video for insulin pens. https://www.[employer]/how-to-use-basaglar    Medications are being recommended to decrease the risk of side effects resulting from hyperglycemia in pregnancy including macrosomia,  hypoglycemia, polyhydramnios, pre-term labor and stillbirth.    Insulin Education:  You will receive 5 pens from the pharmacy  Each insulin pen has 300 units in one pen. Can deliver up to 80 units at one time.   When opening a new pen, remove one insulin pen 15 minutes up to 2 hours before administering to bring insulin to room temperature.   Once you open an insulin pen, it is only good for 28 days at room temperature.   Insulin can be titrated every 3-5 days as needed to control blood sugars.  Insulin doses vary as the pregnancy progresses depending on your weekly blood sugars.     Preparing your pen:  Remove pen cap.  Wipe rubber seal with alcohol wipe. Let dry.  Make sure insulin is clear and colorless. Check expiration date. (Do not use if it's cloudy, colored, or had particles or clumps in it.)  Select a new needle each time. Remove tab, connect and twist on top of insulin pen.   Remove outer and inner needle shields that help protect needle. Keep outer shield to recap your used needle once you are done.     Priming, dosing and injecting:  Perform a 2 unit test dose before each injection. Repeat priming steps until you see insulin at tip of needle. (Do not repeat more than 4 times. If after 4 times you do not see insulin at tip of needle, remove and insert a new needle)   Turn your dose knob to the prescribed units. (Do not count clicks, make sure the number and line matches your prescribed dose)  Choose your site. Remember to rotate injection sites and stay away from stretch marks or scars.   Wipe injection site clean with alcohol and  let dry completely.   Insert needle into your skin, press dose knob and inject slowly. Count to 10 before removing needle from skin.     Clean up:  Replace outer needle shield to cover the needle back up. Unscrew capped needle and throw it away in sharps container.  Make sure your sharp container is: heavy duty plastic, puncture-resistant lid, upright and stable, leak resistant, properly labeled.     Hypoglycemia:  Test your blood sugar at 3:00 am for first 3 mornings following insulin start to monitor for hypoglycemia.   Goal range for 2-3 am:  mg/dL.  Treat low blood sugars <60 or <80 using the 15-15 rule  Always have glucose available for hypoglycemia, use 15 by 15 rule. You can find the 15:15 rule in our GDM booklet.     Scheduling:  Non-stress testing two times weekly and SHOLA testing beginning at 32 weeks gestation. Call [phone] to schedule if not already scheduled.   Ultrasounds every 4 weeks at the [facility]. Call [phone] to schedule if not already scheduled.     Reporting Blood Sugars:  When adding your blood sugar readings to your glucose flow sheet please ADD UNITS of insulin. This will allow our team see what day you started and when you made any possible adjustments. Important for our team to know in regards to making any changes since it takes 3-5 days to see a difference. We would appreciate this and thank you for all you do to communicate with our team.     Blood Sugar Ranges:  Fastin-90 mg/dL   Before meals:  mg/dL  1 hour after the start of each meal: 140 mg/dL or <   2 hours after start of each meal: 120 mg/dL or <  2-3 am:  mg/dL    Continue Recommendations:  Testing Blood Sugars: 4 times daily (fasting and 1 or 2hrs after the start of each meal) - as instructed    Reporting Blood Sugars: via [employer] Glucose Flowsheet on a weekly basis until you deliver.  Meal Plan: 3 meals and 3 snacks daily.   Eating every 2 to 3.5 hours during the day.    Be sure to have bedtime snack that includes at least 15 grams of carbohydrates and 2 to 3 servings of protein.  Minimum of total carbohydrates of 175 grams daily.   Carbohydrates always need to be paired with protein.   Physical Activity: If ok by your OB try adding a 20-30 minute walk in evening after dinner to help keep fasting glucose within range.  Continue follow up with OB and MFM as recommended.  Stay in close contact with diabetes education team.   While sick or feeling ill, follow our sick day guidelines in our GDM booklet.   For travel and dining out tips refer to our GDM booklet.    If you have any questions or concerns, please do not hesitate to call us at [phone].      Sarah Wiley RD   Diabetes Educator  [facility]   at [facility]     Thank you for choosing us for your  care today.  If you have any questions about your ultrasound or care, please do not hesitate to contact us or your primary obstetrician.        Some general instructions for your pregnancy are:    Exercise: Aim for 150 minutes per week of regular exercise.  Walking is great!  Nutrition: Choose healthy sources of calcium, iron, and protein.  Avoid ultraprocessed foods and added sugar.  Learn about Preeclampsia: preeclampsia is a common, potentially serious high blood pressure complication in pregnancy.  A blood pressure of 140mmHg (systolic or top number) or 90mmHg (diastolic or bottom number) should be evaluated by your doctor.  Aspirin is sometimes prescribed in early pregnancy to prevent preeclampsia in women with risk factors - ask your obstetrician if you should be on this medication.  For more resources, visit:  https://www.highriskpregnancyinfo.org/preeclampsia  If you smoke, please try to quit completely but also try to reduce your smoking by as much as possible (as soon as possible).  Do not vape.  Please also avoid cannabis products.  Other  warning signs to watch out for in pregnancy or postpartum: chest pain, obstructed breathing or shortness of breath, seizures, thoughts of hurting yourself or your baby, bleeding, a painful or swollen leg, fever, or headache (see [facility] POST-BIRTH Warning Signs campaign).  If these happen call 911.  Itching is also not normal in pregnancy and if you experience this, especially over your hands and feet, potentially worse at night, notify your doctors.

## 2025-04-17 NOTE — PROGRESS NOTES
Name: Ciera Maria      : 1990      MRN: [id number]  Encounter Provider: Sabra Limon MD  Encounter Date: 2025   Encounter department: [facility]  :  Assessment & Plan  Diet controlled gestational diabetes mellitus (GDM) in third trimester  - Needs to start insulin based on hyperglycemia, has follow up appointment tomorrow    Lab Results   Component Value Date    HGBA1C 6.3 (H) 2025            History of gestational diabetes in prior pregnancy, currently pregnant in second trimester         Macrosomia of fetus affecting management of mother in third trimester, single or unspecified fetus  - Follow up discussion after next growth US  - Pelvis proven to 7 lb 15 oz  - Discussed thresholds where CS would be recommended (4500 grams)       History of pre-eclampsia in prior pregnancy, currently pregnant in second trimester         32 weeks gestation of pregnancy             History of Present Illness   The patient denies leakage of fluid, pelvic pain, or vaginal bleeding.  Reports fetal movement.        Ciera Maria is a 35 y.o. female who presents for routine prenatal care at 32w2d    History obtained from: patient    Review of Systems   Constitutional:  Negative for chills and fever.   Respiratory:  Negative for cough and shortness of breath.    Cardiovascular:  Negative for chest pain and leg swelling.   Gastrointestinal:  Negative for abdominal pain, nausea and vomiting.   Genitourinary:  Negative for dysuria, frequency and urgency.   Neurological:  Negative for dizziness, light-headedness and headaches.     Medical History Reviewed by provider this encounter:     .     Objective   /70   Wt 107 kg (234 lb 14.4 oz)   LMP 2024   BMI 40.32 kg/m²      Physical Exam  Constitutional:       Appearance: Normal appearance.   HENT:      Head: Normocephalic and atraumatic.   Cardiovascular:      Rate and Rhythm: Normal rate.   Pulmonary:       Effort: Pulmonary effort is normal.   Skin:     General: Skin is warm.   Neurological:      General: No focal deficit present.      Mental Status: She is alert.   Psychiatric:         Mood and Affect: Mood normal.             Sabra Limon MD  [facility]  [facility]  4/17/2025 4:46 PM

## 2025-04-17 NOTE — ASSESSMENT & PLAN NOTE
- Follow up discussion after next growth US  - Pelvis proven to 7 lb 15 oz  - Discussed thresholds where CS would be recommended (4500 grams)

## 2025-04-18 ENCOUNTER — TELEMEDICINE (OUTPATIENT)
Dept: PERINATAL CARE | Facility: CLINIC | Age: 35
End: 2025-04-18
Attending: OBSTETRICS & GYNECOLOGY
Payer: COMMERCIAL

## 2025-04-18 DIAGNOSIS — O24.414 INSULIN CONTROLLED GESTATIONAL DIABETES MELLITUS (GDM) IN THIRD TRIMESTER: Primary | ICD-10-CM

## 2025-04-18 DIAGNOSIS — Z3A.32 32 WEEKS GESTATION OF PREGNANCY: ICD-10-CM

## 2025-04-18 DIAGNOSIS — Z86.32 HISTORY OF INSULIN CONTROLLED GESTATIONAL DIABETES MELLITUS (GDM): ICD-10-CM

## 2025-04-18 PROCEDURE — G0108 DIAB MANAGE TRN  PER INDIV: HCPCS

## 2025-04-18 RX ORDER — INSULIN GLARGINE-YFGN 100 [IU]/ML
INJECTION, SOLUTION SUBCUTANEOUS
Qty: 15 ML | Refills: 0 | Status: SHIPPED | OUTPATIENT
Start: 2025-04-18 | End: 2025-06-10

## 2025-04-18 RX ORDER — PEN NEEDLE, DIABETIC 30 GX3/16"
NEEDLE, DISPOSABLE MISCELLANEOUS
Qty: 100 EACH | Refills: 3 | Status: SHIPPED | OUTPATIENT
Start: 2025-04-18 | End: 2025-06-10

## 2025-04-18 NOTE — PATIENT INSTRUCTIONS
Thank you for choosing us for your  care today.  If you have any questions about your ultrasound or care, please do not hesitate to contact us or your primary obstetrician.        Some general instructions for your pregnancy are:    Exercise: Aim for 150 minutes per week of regular exercise.  Walking is great!  Nutrition: Choose healthy sources of calcium, iron, and protein.  Avoid ultraprocessed foods and added sugar.  Learn about Preeclampsia: preeclampsia is a common, potentially serious high blood pressure complication in pregnancy.  A blood pressure of 140mmHg (systolic or top number) or 90mmHg (diastolic or bottom number) should be evaluated by your doctor.  Aspirin is sometimes prescribed in early pregnancy to prevent preeclampsia in women with risk factors - ask your obstetrician if you should be on this medication.  For more resources, visit:  https://www.highriskpregnancyinfo.org/preeclampsia  If you smoke, please try to quit completely but also try to reduce your smoking by as much as possible (as soon as possible).  Do not vape.  Please also avoid cannabis products.  Other warning signs to watch out for in pregnancy or postpartum: chest pain, obstructed breathing or shortness of breath, seizures, thoughts of hurting yourself or your baby, bleeding, a painful or swollen leg, fever, or headache (see [facility] Sign[facility]).  If these happen call 911.  Itching is also not normal in pregnancy and if you experience this, especially over your hands and feet, potentially worse at night, notify your doctors.     Kick Counts in Pregnancy   AMBULATORY CARE:   Kick counts  measure how much your baby is moving in your womb. A kick from your baby can be felt as a twist, turn, swish, roll, or jab. It is common to feel your baby kicking at 26 to 28 weeks of pregnancy. You may feel your baby kick as early as 20 weeks of pregnancy. You may want to start counting at 28 weeks.   Contact your  doctor immediately if:   You feel a change in the number of kicks or movements of your baby.      You feel fewer than 10 kicks within 2 hours.      You have questions or concerns about your baby's movements.     Why measure kick counts:  Your baby's movement may provide information about your baby's health. He or she may move less, or not at all, if there are problems. Your baby may move less if he or she is not getting enough oxygen or nutrition from the placenta. Do not smoke while you are pregnant. Smoking decreases the amount of oxygen that gets to your baby. Talk to your healthcare provider if you need help to quit smoking. Tell your healthcare provider as soon as you feel a change in your baby's movements.  When to measure kick counts:   Measure kick counts at the same time every day.       Measure kick counts when your baby is awake and most active. Your baby may be most active in the evening.     How to measure kick counts:  Check that your baby is awake before you measure kick counts. You can wake up your baby by lightly pushing on your belly, walking, or drinking something cold. Your healthcare provider may tell you different ways to measure kick counts. You may be told to do the following:  Use a chart or clock to keep track of the time you start and finish counting.      Sit in a chair or lie on your left side.      Place your hands on the largest part of your belly.      Count until you reach 10 kicks. Write down how much time it takes to count 10 kicks.      It may take 30 minutes to 2 hours to count 10 kicks. It should not take more than 2 hours to count 10 kicks.     Follow up with your doctor as directed:  Write down your questions so you remember to ask them during your visits.   © Copyright [employer] 2023 Information is for End User's use only and may not be sold, redistributed or otherwise used for commercial purposes.  The above information is an  only. It is not intended as  medical advice for individual conditions or treatments. Talk to your doctor, nurse or pharmacist before following any medical regimen to see if it is safe and effective for you. Nonstress Test for Pregnancy   WHAT YOU NEED TO KNOW:   What do I need to know about a nonstress test?  A nonstress test measures your baby's heart rate and movements. Nonstress means that no stress will be placed on your baby during the test.  How do I prepare for a nonstress test?  Your healthcare provider will talk to you about how to prepare for this test. Your provider may tell you to eat and drink plenty of liquids before your test. If you smoke, you may be asked not to smoke within 2 hours before the test. Your provider will also tell you which medicines to take or not take on the day of your test.  What will happen during a nonstress test?  You may be asked to lie down or recline back for the test on a bed. One or 2 belts with sensors will be placed around your abdomen. Your baby's heart rate will be recorded with a machine. If your baby does not move, your baby may be asleep. Your healthcare provider may make a noise near your abdomen to try to wake your baby. The test usually takes about 20 minutes, but can take longer if your baby needs to be awakened.        What do I need to know about the test results?  Your baby will be expected to move at least 2 times for a certain amount of time. Your baby's heart rate will be expected to go up by a certain number of beats per minute during movement. If your baby does not move as expected, the test may need to be repeated or you may need other tests.  CARE AGREEMENT:   You have the right to help plan your care. Learn about your health condition and how it may be treated. Discuss treatment options with your healthcare providers to decide what care you want to receive. You always have the right to refuse treatment. The above information is an  only. It is not intended as  medical advice for individual conditions or treatments. Talk to your doctor, nurse or pharmacist before following any medical regimen to see if it is safe and effective for you.  ©  [employer]2023 Information is for End User's use only and may not be sold, redistributed or otherwise used for commercial purposes. Thank you for choosing us for your  care today.  If you have any questions about your ultrasound or care, please do not hesitate to contact us or your primary obstetrician.        Some general instructions for your pregnancy are:    Exercise: Aim for 150 minutes per week of regular exercise.  Walking is great!  Nutrition: Choose healthy sources of calcium, iron, and protein.  Avoid ultraprocessed foods and added sugar.  Learn about Preeclampsia: preeclampsia is a common, potentially serious high blood pressure complication in pregnancy.  A blood pressure of 140mmHg (systolic or top number) or 90mmHg (diastolic or bottom number) should be evaluated by your doctor.  Aspirin is sometimes prescribed in early pregnancy to prevent preeclampsia in women with risk factors - ask your obstetrician if you should be on this medication.  For more resources, visit:  https://www.highriskpregnancyinfo.org/preeclampsia  If you smoke, please try to quit completely but also try to reduce your smoking by as much as possible (as soon as possible).  Do not vape.  Please also avoid cannabis products.  Other warning signs to watch out for in pregnancy or postpartum: chest pain, obstructed breathing or shortness of breath, seizures, thoughts of hurting yourself or your baby, bleeding, a painful or swollen leg, fever, or headache (see [facility] POST-BIRTH Warning Signs campaign).  If these happen call 911.  Itching is also not normal in pregnancy and if you experience this, especially over your hands and feet, potentially worse at night, notify your doctors.     Thank you for choosing us for your  care today.   If you have any questions about your ultrasound or care, please do not hesitate to contact us or your primary obstetrician.        Some general instructions for your pregnancy are:    Exercise: Aim for 150 minutes per week of regular exercise.  Walking is great!  Nutrition: Choose healthy sources of calcium, iron, and protein.  Avoid ultraprocessed foods and added sugar.  Learn about Preeclampsia: preeclampsia is a common, potentially serious high blood pressure complication in pregnancy.  A blood pressure of 140mmHg (systolic or top number) or 90mmHg (diastolic or bottom number) should be evaluated by your doctor.  Aspirin is sometimes prescribed in early pregnancy to prevent preeclampsia in women with risk factors - ask your obstetrician if you should be on this medication.  For more resources, visit:

## 2025-04-18 NOTE — PROGRESS NOTES
CLASS 2 - Individual  (virtual visit)    Thank you for referring your patient to [facility] Diabetes and Pregnancy [facility].     Ciera Maria is a  35 y.o. female who presents today unaccompanied for Gestational Diabetes, Patient Education, and Virtual Regular Visit  Patient is at 32w3d gestation, Estimated Date of Delivery: 6/10/25.     Visit Diagnosis:  Encounter Diagnosis     ICD-10-CM    1. Insulin controlled gestational diabetes mellitus (GDM) in third trimester  O24.414       2. 32 weeks gestation of pregnancy  Z3A.32       3. History of insulin controlled gestational diabetes mellitus (GDM)  Z86.32     Was taking long-acting insulin once daily at bedtime         Reviewed and updated the following from patients medical record: PMH, Problem List, Allergies, and Current Medications.    Labs  GDM LABS: See Class 1 Note    A1C:  Lab Results   Component Value Date/Time    HGBA1C 6.3 (H) 03/31/2025 09:24 AM    HGBA1C 5.6 09/05/2024 09:34 AM    HGBA1C 5.5 08/26/2023 11:15 AM    HGBA1C 5.3 08/01/2015 11:25 AM        Labs Ordered This Visit: None    Current Medications:    Current Outpatient Medications:     albuterol (ProAir HFA) 90 mcg/act inhaler, Inhale 2 puffs every 6 (six) hours as needed for wheezing, Disp: 8.5 g, Rfl: 1    ascorbic acid (VITAMIN C) 500 mg tablet, Take 500 mg by mouth daily, Disp: , Rfl:     aspirin 81 mg chewable tablet, Chew 162 mg daily every night, Disp: , Rfl:     Blood Glucose Monitoring Suppl (Contour Next EZ) w/Device KIT, Test x4 Daily or as instructed, Disp: 1 kit, Rfl: 0    clotrimazole-betamethasone (LOTRISONE) 1-0.05 % cream, Apply topically 2 (two) times a day, Disp: 45 g, Rfl: 1    Contour Next Test test strip, Test 4 Times Daily or as instructed, Disp: 100 strip, Rfl: 5    docusate sodium (COLACE) 100 mg capsule, Take 100 mg by mouth 2 (two) times a day, Disp: , Rfl:     famotidine (Pepcid) 20 mg tablet, Take 20 mg by mouth as needed for heartburn or  "indigestion, Disp: , Rfl:     ferrous sulfate 324 (65 Fe) mg, Take 1 tablet (324 mg total) by mouth every other day, Disp: 90 tablet, Rfl: 0    Microlet Lancets MISC, Use 4 a Day or as instructed, Disp: 100 each, Rfl: 5    Prenatal Vit-Fe Fumarate-FA (PRENATAL PO), Take 1 tablet by mouth in the morning, Disp: , Rfl:     Probiotic Product (PROBIOTIC DAILY PO), Take 1 tablet by mouth in the morning, Disp: , Rfl:      Anthropometrics:  Ht Readings from Last 1 Encounters:   25 5' 4\" (1.626 m)      Wt Readings from Last 3 Encounters:   25 107 kg (234 lb 14.4 oz)   25 105 kg (232 lb 6.4 oz)   25 106 kg (234 lb 3.2 oz)      Pre-Gravid Wt Pre-Gravid BMI TWG   93.4 kg (206 lb) 35.34 13.1 kg (28 lb 14.4 oz)     Total Pregnancy Weight Gain Recommendations: 5 kg (11 lb)-9 kg (19 lb)   Current Wt Status Compared to Recommendations: Exceeding -- Recommended to maintain wt for remainder of pregnancy    Most Recent Ultrasound Results:  Findings: US (25; 32w1d) AC: 99%, EFW: 92%, NML SHOLA    BLOOD GLUCOSE MONITORING:   Timing/Frequency of SMB x per day (Fasting, 2 hour after start of each meal)  Goals: (Fasting) 60-90mg/dL // (2hr PP) <120mg/dL  Method of Reporting Blood Sugars: Chatous Glucose Flowsheet    BG LOG:         Review of Blood Glucose Log:   FBG = Not well controlled  Post-Prandial BG = Not well controlled    MEAL PLAN     Review of Patient's Current Diet: Pt states she is having 3 meals and 2 snacks daily. She has been skipping the bedtime snack due to feeling too full. Due to skipping the bedtime snack, she has been fasting for greater than 10hrs overnight. She typically has dinner at 6-7pm and goes to bed around 11-11:30pm. Dinner typically includes a CHO (rice or potatoes), PRO (chicken or steak) and VEG. She feels she may feel too full due to drinking a lot of water. She feels confident in her ability to determine appropriate portions of carbohydrate and protein. She is avoiding sugar " "sweetened beverages. Dines out about once a week but makes sure to choose the best option available.     Diet Recommendations:  - Continue meal plan: 2000 calorie (CHO:45-15-60-15-60-30) (PRO: /3-1-3/-1-3/4-2)  - Reviewed appropriate portions of carbohydrate and protein at each meal and snack  - Try a protein shake for a bedtime snack  - Instructed to test fasting blood sugar at least 8hrs fasting (no food; only water); Avoid exceeding 10hrs overnight fasting  - Avoid exceeding 3.5hrs without eating during wake hours  - Carry glucometer and fast-acting carbohydrate at all times  - Use the 15-15 rule to treat a low blood sugar <60 or <80 with symptoms. Call [phone] to notify the diabetes team if you experience a blood sugar <60.  - Contact diabetes team with any questions/concerns     Additional Topics Reviewed:    INSULIN EDUCATION    Begin Semglee 32 units once daily at bedtime (10-11pm)  Contact diabetes team on Monday (25) to assess starting meal-time insulin    The patient was instructed on the following:  Side effects of hyperglycemia in pregnancy including macrosomia,  hypoglycemia, polyhydramnios, pre-term labor and stillbirth.  Insulin Administration Time: Once daily at bedtime   Insulin Action: Approx. 24hours   How to Inject Insulin  Injection Sites     Monitoring:  Hypoglycemia signs, symptoms and treatment.   Advised patient to test blood sugar at 3:00 am for first 3 mornings following insulin start to monitor for hypoglycemia  Input in \"Night-time Glucose\" on [employer] Glucose Flowsheet  Increase in maternal-fetal surveillance with insulin initiation.  Non-stress testing twice weekly and SHOLA once weekly beginning at 32 weeks gestation.        Continue ultrasounds every 4 weeks at the [facility]  Continue to test blood sugars 4 time daily:   Fasting (goal 60 mg/dl to 90 mg/dl)  2hrs After the START of each meal (goal less than 120 mg/dl).  Labs: HbA1c every 6 to " "8 weeks    Reporting Blood Sugars:   Diabetes team will continue to review blood sugars once weekly till delivery  Pt instructed to message diabetes team via [employer] message every 3-5 days for insulin adjustment if fasting blood sugars remain >90    Sick day Guidelines:   Advised that sickness will raise blood sugar   If blood sugar is > 160 mg/dL twice in one day call doctor  If on diabetes medications, continue as instructed   If unable to consume normal meal plan, instructed to remain well hydrated   Hypoglycemia & Treatment Guidelines:  Reviewed what hypoglycemia is, signs and symptoms, and how to treat via the 15:15 rule.  Post-Partum Guidelines:  Completion of 75 gm CHO 2 hr gtt at 6 weeks post-partum to check for Type 2 DM diagnosis  Breastfeeding Guidelines:  Continue GDM meal plan plus additional 350-500 calories daily  Examples of protein and carbohydrate snacks provided.  Stay hydrated by drinking 8-10 (8 oz.) fluids daily.    Patient Stated Goal:  \" I will walk 10min after each meal daily\"  Goal Assessment: On track    Diabetes Self Management Support Plan outside of ongoing care: Spouse/Family    Barriers to Learning/Change: No Barriers  Expected Compliance: good    Date to report blood sugars: Weekly   Follow up:  Return in about 3 days (around 2025) for Notify diabetes team if sugars remain above goal.     Begin Time: 11:00am  End Time: 12:00pm    It was a pleasure working with them today. Please feel free to call ([phone]) with any questions or concerns.    Sarah Wiley RD   Diabetes Educator  [facility]  Diabetes and Pregnancy Program  [address], [address]  [place] [address]    Virtual Regular VisitName: Ciera Maria      : 1990      MRN: [id number]  Encounter Provider: Sarah Wiley RD  Encounter Date: 2025   Encounter department: [facility]  :  Assessment & Plan  Insulin controlled gestational diabetes " mellitus (GDM) in third trimester    Lab Results   Component Value Date    HGBA1C 6.3 (H) 03/31/2025            32 weeks gestation of pregnancy         History of insulin controlled gestational diabetes mellitus (GDM)         32 weeks gestation of pregnancy               History of Present Illness     HPI  Review of Systems    Objective   LMP 09/11/2024     Physical Exam    Administrative Statements   Encounter provider Sarah Wiley RD    The Patient is located at Home and in the following state in which I hold an active license [place].    The patient was identified by name and date of birth. Ciera Chen Crow was informed that this is a telemedicine visit and that the visit is being conducted through the Epic Embedded platform. She agrees to proceed..  My office door was closed. No one else was in the room.  She acknowledged consent and understanding of privacy and security of the video platform. The patient has agreed to participate and understands they can discontinue the visit at any time.    I have spent a total time of 60 minutes in caring for this patient on the day of the visit/encounter

## 2025-04-20 PROBLEM — O24.414 INSULIN CONTROLLED GESTATIONAL DIABETES MELLITUS (GDM) IN THIRD TRIMESTER: Status: ACTIVE | Noted: 2025-04-03

## 2025-04-21 ENCOUNTER — ULTRASOUND (OUTPATIENT)
Dept: PERINATAL CARE | Facility: CLINIC | Age: 35
End: 2025-04-21
Payer: COMMERCIAL

## 2025-04-21 VITALS
SYSTOLIC BLOOD PRESSURE: 120 MMHG | HEIGHT: 64 IN | DIASTOLIC BLOOD PRESSURE: 64 MMHG | HEART RATE: 89 BPM | BODY MASS INDEX: 40.46 KG/M2 | WEIGHT: 237 LBS

## 2025-04-21 DIAGNOSIS — Z3A.32 32 WEEKS GESTATION OF PREGNANCY: ICD-10-CM

## 2025-04-21 DIAGNOSIS — O24.414 INSULIN CONTROLLED GESTATIONAL DIABETES MELLITUS (GDM) IN THIRD TRIMESTER: Primary | ICD-10-CM

## 2025-04-21 PROCEDURE — 76818 FETAL BIOPHYS PROFILE W/NST: CPT | Performed by: STUDENT IN AN ORGANIZED HEALTH CARE EDUCATION/TRAINING PROGRAM

## 2025-04-21 NOTE — PROGRESS NOTES
This patient received  care under my supervision on 25 at 32w6d gestational age at [facility].   BPP is 8/10.   -Leanna Meehan MD

## 2025-04-21 NOTE — PROGRESS NOTES
Repeat Non-Stress Testing:    Patient verbalizes +FM. Pt denies ALL:               Leaking of fluid   Contractions   Vaginal bleeding   Decreased fetal movement    Patient is performing daily kick counts. Patient has no questions or concerns.   NST strip reviewed by Dr. Meehan in-person and ordered BPP for today's visit

## 2025-04-24 NOTE — PATIENT INSTRUCTIONS
Thank you for choosing us for your  care today.  If you have any questions about your ultrasound or care, please do not hesitate to contact us or your primary obstetrician.        Some general instructions for your pregnancy are:    Exercise: Aim for 150 minutes per week of regular exercise.  Walking is great!  Nutrition: Choose healthy sources of calcium, iron, and protein.  Avoid ultraprocessed foods and added sugar.  Learn about Preeclampsia: preeclampsia is a common, potentially serious high blood pressure complication in pregnancy.  A blood pressure of 140mmHg (systolic or top number) or 90mmHg (diastolic or bottom number) should be evaluated by your doctor.  Aspirin is sometimes prescribed in early pregnancy to prevent preeclampsia in women with risk factors - ask your obstetrician if you should be on this medication.  For more resources, visit:  https://www.highriskpregnancyinfo.org/preeclampsia  If you smoke, please try to quit completely but also try to reduce your smoking by as much as possible (as soon as possible).  Do not vape.  Please also avoid cannabis products.  Other warning signs to watch out for in pregnancy or postpartum: chest pain, obstructed breathing or shortness of breath, seizures, thoughts of hurting yourself or your baby, bleeding, a painful or swollen leg, fever, or headache (see [facility] Sign[facility]).  If these happen call 911.  Itching is also not normal in pregnancy and if you experience this, especially over your hands and feet, potentially worse at night, notify your doctors.     Kick Counts in Pregnancy   AMBULATORY CARE:   Kick counts  measure how much your baby is moving in your womb. A kick from your baby can be felt as a twist, turn, swish, roll, or jab. It is common to feel your baby kicking at 26 to 28 weeks of pregnancy. You may feel your baby kick as early as 20 weeks of pregnancy. You may want to start counting at 28 weeks.   Contact your  doctor immediately if:   You feel a change in the number of kicks or movements of your baby.      You feel fewer than 10 kicks within 2 hours.      You have questions or concerns about your baby's movements.     Why measure kick counts:  Your baby's movement may provide information about your baby's health. He or she may move less, or not at all, if there are problems. Your baby may move less if he or she is not getting enough oxygen or nutrition from the placenta. Do not smoke while you are pregnant. Smoking decreases the amount of oxygen that gets to your baby. Talk to your healthcare provider if you need help to quit smoking. Tell your healthcare provider as soon as you feel a change in your baby's movements.  When to measure kick counts:   Measure kick counts at the same time every day.       Measure kick counts when your baby is awake and most active. Your baby may be most active in the evening.     How to measure kick counts:  Check that your baby is awake before you measure kick counts. You can wake up your baby by lightly pushing on your belly, walking, or drinking something cold. Your healthcare provider may tell you different ways to measure kick counts. You may be told to do the following:  Use a chart or clock to keep track of the time you start and finish counting.      Sit in a chair or lie on your left side.      Place your hands on the largest part of your belly.      Count until you reach 10 kicks. Write down how much time it takes to count 10 kicks.      It may take 30 minutes to 2 hours to count 10 kicks. It should not take more than 2 hours to count 10 kicks.     Follow up with your doctor as directed:  Write down your questions so you remember to ask them during your visits.   © Copyright [employer] 2023 Information is for End User's use only and may not be sold, redistributed or otherwise used for commercial purposes.  The above information is an  only. It is not intended as  medical advice for individual conditions or treatments. Talk to your doctor, nurse or pharmacist before following any medical regimen to see if it is safe and effective for you. Nonstress Test for Pregnancy   WHAT YOU NEED TO KNOW:   What do I need to know about a nonstress test?  A nonstress test measures your baby's heart rate and movements. Nonstress means that no stress will be placed on your baby during the test.  How do I prepare for a nonstress test?  Your healthcare provider will talk to you about how to prepare for this test. Your provider may tell you to eat and drink plenty of liquids before your test. If you smoke, you may be asked not to smoke within 2 hours before the test. Your provider will also tell you which medicines to take or not take on the day of your test.  What will happen during a nonstress test?  You may be asked to lie down or recline back for the test on a bed. One or 2 belts with sensors will be placed around your abdomen. Your baby's heart rate will be recorded with a machine. If your baby does not move, your baby may be asleep. Your healthcare provider may make a noise near your abdomen to try to wake your baby. The test usually takes about 20 minutes, but can take longer if your baby needs to be awakened.        What do I need to know about the test results?  Your baby will be expected to move at least 2 times for a certain amount of time. Your baby's heart rate will be expected to go up by a certain number of beats per minute during movement. If your baby does not move as expected, the test may need to be repeated or you may need other tests.  CARE AGREEMENT:   You have the right to help plan your care. Learn about your health condition and how it may be treated. Discuss treatment options with your healthcare providers to decide what care you want to receive. You always have the right to refuse treatment. The above information is an  only. It is not intended as  medical advice for individual conditions or treatments. Talk to your doctor, nurse or pharmacist before following any medical regimen to see if it is safe and effective for you.  © 2023 Information is for End User's use only and may not be sold, redistributed or otherwise used for commercial purposes. Thank you for choosing us for your  care today.  If you have any questions about your ultrasound or care, please do not hesitate to contact us or your primary obstetrician.        Some general instructions for your pregnancy are:    Exercise: Aim for 150 minutes per week of regular exercise.  Walking is great!  Nutrition: Choose healthy sources of calcium, iron, and protein.  Avoid ultraprocessed foods and added sugar.  Learn about Preeclampsia: preeclampsia is a common, potentially serious high blood pressure complication in pregnancy.  A blood pressure of 140mmHg (systolic or top number) or 90mmHg (diastolic or bottom number) should be evaluated by your doctor.  Aspirin is sometimes prescribed in early pregnancy to prevent preeclampsia in women with risk factors - ask your obstetrician if you should be on this medication.  For more resources, visit:  https://www.highriskpregnancyinfo.org/preeclampsia  If you smoke, please try to quit completely but also try to reduce your smoking by as much as possible (as soon as possible).  Do not vape.  Please also avoid cannabis products.  Other warning signs to watch out for in pregnancy or postpartum: chest pain, obstructed breathing or shortness of breath, seizures, thoughts of hurting yourself or your baby, bleeding, a painful or swollen leg, fever, or headache (see [facility] POST-BIRTH Warning Signs campaign).  If these happen call 911.  Itching is also not normal in pregnancy and if you experience this, especially over your hands and feet, potentially worse at night, notify your doctors.

## 2025-04-25 ENCOUNTER — TREATMENT (OUTPATIENT)
Dept: PERINATAL CARE | Facility: CLINIC | Age: 35
End: 2025-04-25

## 2025-04-25 ENCOUNTER — ROUTINE PRENATAL (OUTPATIENT)
Dept: PERINATAL CARE | Facility: CLINIC | Age: 35
End: 2025-04-25
Payer: COMMERCIAL

## 2025-04-25 VITALS
BODY MASS INDEX: 40.39 KG/M2 | HEART RATE: 108 BPM | WEIGHT: 236.6 LBS | HEIGHT: 64 IN | SYSTOLIC BLOOD PRESSURE: 116 MMHG | DIASTOLIC BLOOD PRESSURE: 80 MMHG

## 2025-04-25 DIAGNOSIS — O24.414 INSULIN CONTROLLED GESTATIONAL DIABETES MELLITUS (GDM) IN THIRD TRIMESTER: Primary | ICD-10-CM

## 2025-04-25 DIAGNOSIS — Z3A.33 33 WEEKS GESTATION OF PREGNANCY: Primary | ICD-10-CM

## 2025-04-25 DIAGNOSIS — Z3A.33 33 WEEKS GESTATION OF PREGNANCY: ICD-10-CM

## 2025-04-25 DIAGNOSIS — O24.414 INSULIN CONTROLLED GESTATIONAL DIABETES MELLITUS (GDM) IN THIRD TRIMESTER: ICD-10-CM

## 2025-04-25 PROCEDURE — 59025 FETAL NON-STRESS TEST: CPT | Performed by: OBSTETRICS & GYNECOLOGY

## 2025-04-25 RX ORDER — INSULIN GLARGINE 100 [IU]/ML
38 INJECTION, SOLUTION SUBCUTANEOUS
COMMUNITY

## 2025-04-25 NOTE — PROGRESS NOTES
Repeat Non-Stress Testing:    Patient verbalizes +FM. Pt denies ALL:               Leaking of fluid   Contractions   Vaginal bleeding   Decreased fetal movement    Patient is performing daily kick counts. Patient has no questions or concerns.   NST strip reviewed by Dr. Winston in-person.

## 2025-04-25 NOTE — PATIENT INSTRUCTIONS
-Increase Lantus from 32 to 38 units at bedtime  -Always have a bedtime snack with at least 15 grams of carbohydrates and 2 to 3 ounces or 14 to 21 grams of protein  -Keep fasting to no more than 8-10 hours overnight.   -Send a message again on Monday, 4/28/25 for readings to be reviewed again  -Always have glucose available for hypoglycemia, use 15:15 rule, previously reviewed (instructions are on page 25 of your diabetes booklet)  -Call [phone] if blood sugar is < 60 mg/dL, 160 mg/dL twice in the same day, or <200 mg/dL (after washing hands a second time and retesting)

## 2025-04-25 NOTE — PROGRESS NOTES
Ciera Maria is a 35 y.o. year-old female para  with an GUY of 6/10/2025, by Ultrasound at 33w3d weeks gestation.     Diabetes Type: Gestational        Current Outpatient Medications on File Prior to Visit   Medication Sig    albuterol (ProAir HFA) 90 mcg/act inhaler Inhale 2 puffs every 6 (six) hours as needed for wheezing    ascorbic acid (VITAMIN C) 500 mg tablet Take 500 mg by mouth daily    aspirin 81 mg chewable tablet Chew 162 mg daily every night    Blood Glucose Monitoring Suppl (Contour Next EZ) w/Device KIT Test x4 Daily or as instructed    clotrimazole-betamethasone (LOTRISONE) 1-0.05 % cream Apply topically 2 (two) times a day    Contour Next Test test strip Test 4 Times Daily or as instructed    docusate sodium (COLACE) 100 mg capsule Take 100 mg by mouth 2 (two) times a day    famotidine (Pepcid) 20 mg tablet Take 20 mg by mouth as needed for heartburn or indigestion    ferrous sulfate 324 (65 Fe) mg Take 1 tablet (324 mg total) by mouth every other day    insulin glargine (LANTUS) 100 units/mL subcutaneous injection Inject 38 Units under the skin daily at bedtime 32 units as per patient    Insulin Pen Needle (Pen Needles) 31G X 5 MM MISC Use with insulin pen once daily at bedtime    Microlet Lancets MISC Use 4 a Day or as instructed    Prenatal Vit-Fe Fumarate-FA (PRENATAL PO) Take 1 tablet by mouth in the morning    Probiotic Product (PROBIOTIC DAILY PO) Take 1 tablet by mouth in the morning    Semglee, yfgn, 100 UNIT/ML SOPN Inject 32 units once daily at bedtime (10-11pm); Titrate as instructed (Patient not taking: Reported on 2025)        Current insulin regimen: Lantus 32 units, once daily at bedtime        Most recent A1C:   Lab Results   Component Value Date    HGBA1C 6.3 (H) 2025     Goal for A1c for this patient is 5.6% or less. Recommend a repeat hemoglobin A1c: No     Last US date: 25  Findings: AC 99%, EFW 92%, Amniotic Fluid = Normal     Review of recent  blood sugars reveals:  Continued fasting and post meal elevations    4/25/25 Fasting blood sugar: 133          Changes made to regimen today: Increase Lantus from 32 to 38 units at bedtime        Patient Instructions Reviewed  Check blood sugars 4 times daily (fasting and 2 hours after first bite of each meal)  Send a message again on Monday, 4/28/25 for readings to be reviewed again   Call office if blood sugar < 60 mg/dL, 160 mg/dL twice in the same day, or <200 mg/dL (after washing hands a second time and retesting)  Goal for blood sugars 60 -120 mg/dl     Kristy Laguna, ANNIAN, LDN

## 2025-04-25 NOTE — PROGRESS NOTES
Nonstress test at 33-3/7 weeks.    Indication A2 GDM.    Interpretation reactive.    Plan.    12 times a week nonstress test once a week SHOLA Daily fetal movement counts.    Karl Winston MD, MSCE    Maternal-fetal medicine

## 2025-04-29 NOTE — PATIENT INSTRUCTIONS
Thank you for choosing us for your  care today.  If you have any questions about your ultrasound or care, please do not hesitate to contact us or your primary obstetrician.        Some general instructions for your pregnancy are:    Exercise: Aim for 150 minutes per week of regular exercise.  Walking is great!  Nutrition: Choose healthy sources of calcium, iron, and protein.  Avoid ultraprocessed foods and added sugar.  Learn about Preeclampsia: preeclampsia is a common, potentially serious high blood pressure complication in pregnancy.  A blood pressure of 140mmHg (systolic or top number) or 90mmHg (diastolic or bottom number) should be evaluated by your doctor.  Aspirin is sometimes prescribed in early pregnancy to prevent preeclampsia in women with risk factors - ask your obstetrician if you should be on this medication.  For more resources, visit:  https://[email or web address]  If you smoke, please try to quit completely but also try to reduce your smoking by as much as possible (as soon as possible).  Do not vape.  Please also avoid cannabis products.  Other warning signs to watch out for in pregnancy or postpartum: chest pain, obstructed breathing or shortness of breath, seizures, thoughts of hurting yourself or your baby, bleeding, a painful or swollen leg, fever, or headache (see [facility]).  If these happen call 911.  Itching is also not normal in pregnancy and if you experience this, especially over your hands and feet, potentially worse at night, notify your doctors.     Kick Counts in Pregnancy   AMBULATORY CARE:   Kick counts  measure how much your baby is moving in your womb. A kick from your baby can be felt as a twist, turn, swish, roll, or jab. It is common to feel your baby kicking at 26 to 28 weeks of pregnancy. You may feel your baby kick as early as 20 weeks of pregnancy. You may want to start counting at 28 weeks.   Contact your  doctor immediately if:   You feel a change in the number of kicks or movements of your baby.      You feel fewer than 10 kicks within 2 hours.      You have questions or concerns about your baby's movements.     Why measure kick counts:  Your baby's movement may provide information about your baby's health. He or she may move less, or not at all, if there are problems. Your baby may move less if he or she is not getting enough oxygen or nutrition from the placenta. Do not smoke while you are pregnant. Smoking decreases the amount of oxygen that gets to your baby. Talk to your healthcare provider if you need help to quit smoking. Tell your healthcare provider as soon as you feel a change in your baby's movements.  When to measure kick counts:   Measure kick counts at the same time every day.       Measure kick counts when your baby is awake and most active. Your baby may be most active in the evening.     How to measure kick counts:  Check that your baby is awake before you measure kick counts. You can wake up your baby by lightly pushing on your belly, walking, or drinking something cold. Your healthcare provider may tell you different ways to measure kick counts. You may be told to do the following:  Use a chart or clock to keep track of the time you start and finish counting.      Sit in a chair or lie on your left side.      Place your hands on the largest part of your belly.      Count until you reach 10 kicks. Write down how much time it takes to count 10 kicks.      It may take 30 minutes to 2 hours to count 10 kicks. It should not take more than 2 hours to count 10 kicks.     Follow up with your doctor as directed:  Write down your questions so you remember to ask them during your visits.   © Copyright [employer] 2023 Information is for End User's use only and may not be sold, redistributed or otherwise used for commercial purposes.  The above information is an  only. It is not intended as  medical advice for individual conditions or treatments. Talk to your doctor, nurse or pharmacist before following any medical regimen to see if it is safe and effective for you. Nonstress Test for Pregnancy   WHAT YOU NEED TO KNOW:   What do I need to know about a nonstress test?  A nonstress test measures your baby's heart rate and movements. Nonstress means that no stress will be placed on your baby during the test.  How do I prepare for a nonstress test?  Your healthcare provider will talk to you about how to prepare for this test. Your provider may tell you to eat and drink plenty of liquids before your test. If you smoke, you may be asked not to smoke within 2 hours before the test. Your provider will also tell you which medicines to take or not take on the day of your test.  What will happen during a nonstress test?  You may be asked to lie down or recline back for the test on a bed. One or 2 belts with sensors will be placed around your abdomen. Your baby's heart rate will be recorded with a machine. If your baby does not move, your baby may be asleep. Your healthcare provider may make a noise near your abdomen to try to wake your baby. The test usually takes about 20 minutes, but can take longer if your baby needs to be awakened.        What do I need to know about the test results?  Your baby will be expected to move at least 2 times for a certain amount of time. Your baby's heart rate will be expected to go up by a certain number of beats per minute during movement. If your baby does not move as expected, the test may need to be repeated or you may need other tests.  CARE AGREEMENT:   You have the right to help plan your care. Learn about your health condition and how it may be treated. Discuss treatment options with your healthcare providers to decide what care you want to receive. You always have the right to refuse treatment. The above information is an  only. It is not intended as  medical advice for individual conditions or treatments. Talk to your doctor, nurse or pharmacist before following any medical regimen to see if it is safe and effective for you.  © 2023 Information is for End User's use only and may not be sold, redistributed or otherwise used for commercial purposes. Thank you for choosing us for your  care today.  If you have any questions about your ultrasound or care, please do not hesitate to contact us or your primary obstetrician.        Some general instructions for your pregnancy are:    Exercise: Aim for 150 minutes per week of regular exercise.  Walking is great!  Nutrition: Choose healthy sources of calcium, iron, and protein.  Avoid ultraprocessed foods and added sugar.  Learn about Preeclampsia: preeclampsia is a common, potentially serious high blood pressure complication in pregnancy.  A blood pressure of 140mmHg (systolic or top number) or 90mmHg (diastolic or bottom number) should be evaluated by your doctor.  Aspirin is sometimes prescribed in early pregnancy to prevent preeclampsia in women with risk factors - ask your obstetrician if you should be on this medication.  For more resources, visit:  https://[email or web address]  If you smoke, please try to quit completely but also try to reduce your smoking by as much as possible (as soon as possible).  Do not vape.  Please also avoid cannabis products.  Other warning signs to watch out for in pregnancy or postpartum: chest pain, obstructed breathing or shortness of breath, seizures, thoughts of hurting yourself or your baby, bleeding, a painful or swollen leg, fever, or headache (see [facility] POST-BIRTH Warning Signs campaign).  If these happen call 911.  Itching is also not normal in pregnancy and if you experience this, especially over your hands and feet, potentially worse at night, notify your doctors.

## 2025-04-30 ENCOUNTER — ULTRASOUND (OUTPATIENT)
Dept: PERINATAL CARE | Facility: CLINIC | Age: 35
End: 2025-04-30
Payer: COMMERCIAL

## 2025-04-30 ENCOUNTER — ROUTINE PRENATAL (OUTPATIENT)
Dept: OBGYN CLINIC | Facility: MEDICAL CENTER | Age: 35
End: 2025-04-30

## 2025-04-30 ENCOUNTER — TREATMENT (OUTPATIENT)
Dept: PERINATAL CARE | Facility: CLINIC | Age: 35
End: 2025-04-30

## 2025-04-30 VITALS — WEIGHT: 237 LBS | DIASTOLIC BLOOD PRESSURE: 70 MMHG | BODY MASS INDEX: 40.68 KG/M2 | SYSTOLIC BLOOD PRESSURE: 124 MMHG

## 2025-04-30 VITALS
HEIGHT: 64 IN | BODY MASS INDEX: 40.67 KG/M2 | SYSTOLIC BLOOD PRESSURE: 128 MMHG | WEIGHT: 238.2 LBS | DIASTOLIC BLOOD PRESSURE: 70 MMHG | HEART RATE: 72 BPM

## 2025-04-30 DIAGNOSIS — Z3A.34 34 WEEKS GESTATION OF PREGNANCY: ICD-10-CM

## 2025-04-30 DIAGNOSIS — O24.414 INSULIN CONTROLLED GESTATIONAL DIABETES MELLITUS (GDM) IN THIRD TRIMESTER: ICD-10-CM

## 2025-04-30 DIAGNOSIS — O99.213 OBESITY AFFECTING PREGNANCY IN THIRD TRIMESTER, UNSPECIFIED OBESITY TYPE: ICD-10-CM

## 2025-04-30 DIAGNOSIS — O36.63X0 MACROSOMIA OF FETUS AFFECTING MANAGEMENT OF MOTHER IN THIRD TRIMESTER, SINGLE OR UNSPECIFIED FETUS: ICD-10-CM

## 2025-04-30 DIAGNOSIS — O24.414 INSULIN CONTROLLED GESTATIONAL DIABETES MELLITUS (GDM) IN THIRD TRIMESTER: Primary | ICD-10-CM

## 2025-04-30 DIAGNOSIS — O09.292 HISTORY OF PRE-ECLAMPSIA IN PRIOR PREGNANCY, CURRENTLY PREGNANT IN SECOND TRIMESTER: ICD-10-CM

## 2025-04-30 DIAGNOSIS — Z3A.34 34 WEEKS GESTATION OF PREGNANCY: Primary | ICD-10-CM

## 2025-04-30 DIAGNOSIS — Z36.89 ENCOUNTER FOR ULTRASOUND TO CHECK FETAL GROWTH: ICD-10-CM

## 2025-04-30 PROCEDURE — 59025 FETAL NON-STRESS TEST: CPT | Performed by: STUDENT IN AN ORGANIZED HEALTH CARE EDUCATION/TRAINING PROGRAM

## 2025-04-30 PROCEDURE — 76816 OB US FOLLOW-UP PER FETUS: CPT | Performed by: STUDENT IN AN ORGANIZED HEALTH CARE EDUCATION/TRAINING PROGRAM

## 2025-04-30 PROCEDURE — PNV: Performed by: OBSTETRICS & GYNECOLOGY

## 2025-04-30 PROCEDURE — 99213 OFFICE O/P EST LOW 20 MIN: CPT | Performed by: STUDENT IN AN ORGANIZED HEALTH CARE EDUCATION/TRAINING PROGRAM

## 2025-04-30 NOTE — PROGRESS NOTES
Ciera Maria is a 35 y.o. year-old female para  with an GUY of 6/10/2025, by Ultrasound at 34w1d weeks gestation.     Diabetes Type: Gestational        Current Outpatient Medications on File Prior to Visit   Medication Sig    albuterol (ProAir HFA) 90 mcg/act inhaler Inhale 2 puffs every 6 (six) hours as needed for wheezing    ascorbic acid (VITAMIN C) 500 mg tablet Take 500 mg by mouth daily    aspirin 81 mg chewable tablet Chew 162 mg daily every night    Blood Glucose Monitoring Suppl (Contour Next EZ) w/Device KIT Test x4 Daily or as instructed    clotrimazole-betamethasone (LOTRISONE) 1-0.05 % cream Apply topically 2 (two) times a day    Contour Next Test test strip Test 4 Times Daily or as instructed    docusate sodium (COLACE) 100 mg capsule Take 100 mg by mouth 2 (two) times a day    famotidine (Pepcid) 20 mg tablet Take 20 mg by mouth as needed for heartburn or indigestion    ferrous sulfate 324 (65 Fe) mg Take 1 tablet (324 mg total) by mouth every other day    insulin glargine (LANTUS) 100 units/mL subcutaneous injection Inject 38 Units under the skin daily at bedtime 32 units as per patient (Patient taking differently: Inject 38 Units under the skin daily at bedtime Inject 38 units once daily at bedtime.)    Insulin Pen Needle (Pen Needles) 31G X 5 MM MISC Use with insulin pen once daily at bedtime    Microlet Lancets MISC Use 4 a Day or as instructed    Prenatal Vit-Fe Fumarate-FA (PRENATAL PO) Take 1 tablet by mouth in the morning    Probiotic Product (PROBIOTIC DAILY PO) Take 1 tablet by mouth in the morning    Camille, garrisongn, 100 UNIT/ML SOPN Inject 32 units once daily at bedtime (10-11pm); Titrate as instructed (Patient not taking: Reported on 2025)        Current insulin regimen: Insulin Glargine 38 units once daily, at bedtime        Most recent A1C:   Lab Results   Component Value Date    HGBA1C 6.3 (H) 2025     Goal for A1c for this patient is 5.6% or less. Recommend a  repeat hemoglobin A1c: No     Last US date: 4/16/25  Findings: AC 99%, EFW 92%, Amniotic Fluid = normal     Review of recent blood sugars reveals:  Continued fasting elevations >90 mg/dL and post-meal elevations >120 mg/dL          Changes made to regimen today:   Increase Insulin Glargine from 38 to 44 units at bedtime (split into two separate injections of 22 units, changing the needle between doses and moving the second injection 3 inches from the first)        Patient Instructions Reviewed  -Check blood sugars 4 times daily (fasting and 2 hours after first bite of each meal)  -Message diabetes team on Monday 5/05/25 for readings to be reviewed again  -Call office if blood sugar < 60 mg/dL, 160 mg/dL twice in the same day, or <200 mg/dL (after washing hands a second time and retesting)  *Goal for blood sugars 60 -120 mg/dl     Kristy Laguna RDN, LDN

## 2025-04-30 NOTE — PROGRESS NOTES
[facility]: Ms. Maria was seen today for NST (found under the pregnancy episode) which I reviewed the RN assessment and agree, and fetal growth ultrasound (see ultrasound report under OB procedures tab).         MDM:   I. Diagnoses/Problems addressed:  Low  II.  Data: Limited  III.  Risk of morbidity: Low    Please don't hesitate to contact our office with any concerns or questions.  -Leanna Meehan MD

## 2025-04-30 NOTE — PROGRESS NOTES
Repeat Non-Stress Testing:    Patient verbalizes +FM. Pt denies ALL:               Leaking of fluid   Contractions   Vaginal bleeding   Decreased fetal movement    Patient is performing daily kick counts. Patient has no questions or concerns.   NST strip reviewed by Dr. Meehan in-person.

## 2025-04-30 NOTE — PATIENT INSTRUCTIONS
Thank you for choosing us for your  care today.  If you have any questions about your ultrasound or care, please do not hesitate to contact us or your primary obstetrician.        Some general instructions for your pregnancy are:    Exercise: Aim for 150 minutes per week of regular exercise.  Walking is great!  Nutrition: Choose healthy sources of calcium, iron, and protein.  Avoid ultraprocessed foods and added sugar.  Learn about Preeclampsia: preeclampsia is a common, potentially serious high blood pressure complication in pregnancy.  A blood pressure of 140mmHg (systolic or top number) or 90mmHg (diastolic or bottom number) should be evaluated by your doctor.  Aspirin is sometimes prescribed in early pregnancy to prevent preeclampsia in women with risk factors - ask your obstetrician if you should be on this medication.  For more resources, visit:  [email or web address]  If you smoke, please try to quit completely but also try to reduce your smoking by as much as possible (as soon as possible).  Do not vape.  Please also avoid cannabis products.  Other warning signs to watch out for in pregnancy or postpartum: chest pain, obstructed breathing or shortness of breath, seizures, thoughts of hurting yourself or your baby, bleeding, a painful or swollen leg, fever, or headache (see [facility]).  If these happen call 911.  Itching is also not normal in pregnancy and if you experience this, especially over your hands and feet, potentially worse at night, notify your doctors.     Kick Counts in Pregnancy   AMBULATORY CARE:   Kick counts  measure how much your baby is moving in your womb. A kick from your baby can be felt as a twist, turn, swish, roll, or jab. It is common to feel your baby kicking at 26 to 28 weeks of pregnancy. You may feel your baby kick as early as 20 weeks of pregnancy. You may want to start counting at 28 weeks.   Contact your  doctor immediately if:   You feel a change in the number of kicks or movements of your baby.      You feel fewer than 10 kicks within 2 hours.      You have questions or concerns about your baby's movements.     Why measure kick counts:  Your baby's movement may provide information about your baby's health. He or she may move less, or not at all, if there are problems. Your baby may move less if he or she is not getting enough oxygen or nutrition from the placenta. Do not smoke while you are pregnant. Smoking decreases the amount of oxygen that gets to your baby. Talk to your healthcare provider if you need help to quit smoking. Tell your healthcare provider as soon as you feel a change in your baby's movements.  When to measure kick counts:   Measure kick counts at the same time every day.       Measure kick counts when your baby is awake and most active. Your baby may be most active in the evening.     How to measure kick counts:  Check that your baby is awake before you measure kick counts. You can wake up your baby by lightly pushing on your belly, walking, or drinking something cold. Your healthcare provider may tell you different ways to measure kick counts. You may be told to do the following:  Use a chart or clock to keep track of the time you start and finish counting.      Sit in a chair or lie on your left side.      Place your hands on the largest part of your belly.      Count until you reach 10 kicks. Write down how much time it takes to count 10 kicks.      It may take 30 minutes to 2 hours to count 10 kicks. It should not take more than 2 hours to count 10 kicks.     Follow up with your doctor as directed:  Write down your questions so you remember to ask them during your visits.   © Copyright [employer] 2023 Information is for End User's use only and may not be sold, redistributed or otherwise used for commercial purposes.  The above information is an  only. It is not intended as  medical advice for individual conditions or treatments. Talk to your doctor, nurse or pharmacist before following any medical regimen to see if it is safe and effective for you. Nonstress Test for Pregnancy   WHAT YOU NEED TO KNOW:   What do I need to know about a nonstress test?  A nonstress test measures your baby's heart rate and movements. Nonstress means that no stress will be placed on your baby during the test.  How do I prepare for a nonstress test?  Your healthcare provider will talk to you about how to prepare for this test. Your provider may tell you to eat and drink plenty of liquids before your test. If you smoke, you may be asked not to smoke within 2 hours before the test. Your provider will also tell you which medicines to take or not take on the day of your test.  What will happen during a nonstress test?  You may be asked to lie down or recline back for the test on a bed. One or 2 belts with sensors will be placed around your abdomen. Your baby's heart rate will be recorded with a machine. If your baby does not move, your baby may be asleep. Your healthcare provider may make a noise near your abdomen to try to wake your baby. The test usually takes about 20 minutes, but can take longer if your baby needs to be awakened.        What do I need to know about the test results?  Your baby will be expected to move at least 2 times for a certain amount of time. Your baby's heart rate will be expected to go up by a certain number of beats per minute during movement. If your baby does not move as expected, the test may need to be repeated or you may need other tests.  CARE AGREEMENT:   You have the right to help plan your care. Learn about your health condition and how it may be treated. Discuss treatment options with your healthcare providers to decide what care you want to receive. You always have the right to refuse treatment. The above information is an  only. It is not intended as  medical advice for individual conditions or treatments. Talk to your doctor, nurse or pharmacist before following any medical regimen to see if it is safe and effective for you.  © 2023 Information is for End User's use only and may not be sold, redistributed or otherwise used for commercial purposes. Thank you for choosing us for your  care today.  If you have any questions about your ultrasound or care, please do not hesitate to contact us or your primary obstetrician.        Some general instructions for your pregnancy are:    Exercise: Aim for 150 minutes per week of regular exercise.  Walking is great!  Nutrition: Choose healthy sources of calcium, iron, and protein.  Avoid ultraprocessed foods and added sugar.  Learn about Preeclampsia: preeclampsia is a common, potentially serious high blood pressure complication in pregnancy.  A blood pressure of 140mmHg (systolic or top number) or 90mmHg (diastolic or bottom number) should be evaluated by your doctor.  Aspirin is sometimes prescribed in early pregnancy to prevent preeclampsia in women with risk factors - ask your obstetrician if you should be on this medication.  For more resources, visit:  https://[email or web address]  If you smoke, please try to quit completely but also try to reduce your smoking by as much as possible (as soon as possible).  Do not vape.  Please also avoid cannabis products.  Other warning signs to watch out for in pregnancy or postpartum: chest pain, obstructed breathing or shortness of breath, seizures, thoughts of hurting yourself or your baby, bleeding, a painful or swollen leg, fever, or headache (see [facility] POST-BIRTH Warning Signs campaign).  If these happen call 911.  Itching is also not normal in pregnancy and if you experience this, especially over your hands and feet, potentially worse at night, notify your doctors.

## 2025-04-30 NOTE — PATIENT INSTRUCTIONS
Due top contnued fasting and post-meal elevations:    -Increase Insulin Glargine from 38 to 44 units at bedtime (split into two separate injections of 22 units, changing the needle between doses and moving the second injection 3 inches from the first)  -Always have a bedtime snack with at least 15 grams of carbohydrates and 2 to 3 ounces or 14 to 21 grams of protein  -Keep fasting to no more than 8-10 hours overnight.   -Send a message again on Monday, 5/05/25 for readings to be reviewed again  -Always have glucose available for hypoglycemia, use 15:15 rule, previously reviewed (instructions are on page 25 of your diabetes booklet)

## 2025-05-01 NOTE — ASSESSMENT & PLAN NOTE
Lab Results   Component Value Date    HGBA1C 6.3 (H) 03/31/2025          following with MFM   Interested  on IOL at  39 weeks  the latest  unless  ok  to induce earlier she is in  agreement

## 2025-05-01 NOTE — PROGRESS NOTES
Name: Ciera Maria      : 1990      MRN: [id number]  Encounter Provider: Yoli Beth MD  Encounter Date: 2025   Encounter department: [facility]  :  Assessment & Plan  Insulin controlled gestational diabetes mellitus (GDM) in third trimester    Lab Results   Component Value Date    HGBA1C 6.3 (H) 2025          following with MFM   Interested  on IOL at  39 weeks  the latest  unless  ok  to induce earlier she is in  agreement    Macrosomia of fetus affecting management of mother in third trimester, single or unspecified fetus       we discussed  shoulder dystocia risks    We discussed  maneuvers  that  could be  performed  in event one encountered as well as  possible risks to baby and  mom   Verbalized understanding   History of pre-eclampsia in prior pregnancy, currently pregnant in second trimester       normotensive   No signs or  symptoms    34 weeks gestation of pregnancy       FKC and  PTL precautions reviewed         History of Present Illness   HPI  Ciera Maria is a 35 y.o. female who presents for routine  PNV   No LOF or  VB no  contractions   +FM   Increased  insulin to 44units        Review of Systems       Objective   /70   Wt 108 kg (237 lb)   LMP 2024   BMI 40.68 kg/m²      Physical Exam  Vitals reviewed.   Constitutional:       Appearance: Normal appearance.   HENT:      Head: Normocephalic and atraumatic.      Nose: Nose normal.   Eyes:      Conjunctiva/sclera: Conjunctivae normal.   Pulmonary:      Effort: Pulmonary effort is normal.   Abdominal:      Comments: Gravid  non tender     Neurological:      General: No focal deficit present.      Mental Status: She is alert and oriented to person, place, and time.   Psychiatric:         Mood and Affect: Mood normal.         Behavior: Behavior normal.

## 2025-05-01 NOTE — ASSESSMENT & PLAN NOTE
we discussed  shoulder dystocia risks    We discussed  maneuvers  that  could be  performed  in event one encountered as well as  possible risks to baby and  mom   Verbalized understanding

## 2025-05-02 ENCOUNTER — ROUTINE PRENATAL (OUTPATIENT)
Dept: PERINATAL CARE | Facility: CLINIC | Age: 35
End: 2025-05-02
Payer: COMMERCIAL

## 2025-05-02 VITALS
WEIGHT: 237.8 LBS | HEIGHT: 64 IN | SYSTOLIC BLOOD PRESSURE: 116 MMHG | HEART RATE: 94 BPM | DIASTOLIC BLOOD PRESSURE: 70 MMHG | BODY MASS INDEX: 40.6 KG/M2

## 2025-05-02 DIAGNOSIS — O24.414 INSULIN CONTROLLED GESTATIONAL DIABETES MELLITUS (GDM) IN THIRD TRIMESTER: ICD-10-CM

## 2025-05-02 DIAGNOSIS — Z3A.34 34 WEEKS GESTATION OF PREGNANCY: Primary | ICD-10-CM

## 2025-05-02 PROCEDURE — 59025 FETAL NON-STRESS TEST: CPT | Performed by: NURSE PRACTITIONER

## 2025-05-02 NOTE — PROGRESS NOTES
[facility]: Ms. Maria was seen today at 34w3d gestational age for NST (found under the pregnancy episode) which I reviewed the RN assessment and agree.       Kayla LI

## 2025-05-02 NOTE — PROGRESS NOTES
Repeat Non-Stress Testing:    Patient verbalizes +FM. Pt denies ALL:               Leaking of fluid   Contractions   Vaginal bleeding   Decreased fetal movement    Patient is performing daily kick counts. Patient has no questions or concerns.   NST strip reviewed by GUILLERMO Neves in-person.

## 2025-05-05 ENCOUNTER — TREATMENT (OUTPATIENT)
Dept: PERINATAL CARE | Facility: CLINIC | Age: 35
End: 2025-05-05

## 2025-05-05 DIAGNOSIS — Z3A.34 34 WEEKS GESTATION OF PREGNANCY: ICD-10-CM

## 2025-05-05 DIAGNOSIS — O24.414 INSULIN CONTROLLED GESTATIONAL DIABETES MELLITUS (GDM) IN THIRD TRIMESTER: Primary | ICD-10-CM

## 2025-05-05 NOTE — PROGRESS NOTES
Ciera Maria is a 35 y.o. year-old female para  with an GUY of 6/10/2025, by Ultrasound at 34w6d weeks gestation.     Diabetes Type: Gestational        Current Outpatient Medications on File Prior to Visit   Medication Sig    albuterol (ProAir HFA) 90 mcg/act inhaler Inhale 2 puffs every 6 (six) hours as needed for wheezing    ascorbic acid (VITAMIN C) 500 mg tablet Take 500 mg by mouth daily    aspirin 81 mg chewable tablet Chew 162 mg daily every night    Blood Glucose Monitoring Suppl (Contour Next EZ) w/Device KIT Test x4 Daily or as instructed    clotrimazole-betamethasone (LOTRISONE) 1-0.05 % cream Apply topically 2 (two) times a day    Contour Next Test test strip Test 4 Times Daily or as instructed    docusate sodium (COLACE) 100 mg capsule Take 100 mg by mouth 2 (two) times a day    famotidine (Pepcid) 20 mg tablet Take 20 mg by mouth as needed for heartburn or indigestion    ferrous sulfate 324 (65 Fe) mg Take 1 tablet (324 mg total) by mouth every other day    insulin glargine (LANTUS) 100 units/mL subcutaneous injection Inject 38 Units under the skin daily at bedtime 32 units as per patient (Patient taking differently: Inject 44 Units under the skin daily at bedtime 44units as per patient)    Insulin Pen Needle (Pen Needles) 31G X 5 MM MISC Use with insulin pen once daily at bedtime    Microlet Lancets MISC Use 4 a Day or as instructed    Prenatal Vit-Fe Fumarate-FA (PRENATAL PO) Take 1 tablet by mouth in the morning    Probiotic Product (PROBIOTIC DAILY PO) Take 1 tablet by mouth in the morning        Current insulin regimen: Insuline Glargine 44 units once daily, at bedtime        Most recent A1C:   Lab Results   Component Value Date    HGBA1C 6.3 (H) 2025     Goal for A1c for this patient is 5.6% or less. Recommend a repeat hemoglobin A1c:  No     Last US date: 25  Findings: AC 98%, EFW 92% (possible macrosomia), Amniotic Fluid = Normal     Review of recent blood sugars  reveals: Elevated fasting and post-meal blood sugars    5/05/25: Fasting - 136  5/04/25: Fasting - forgot to test          Changes made to regimen today:   -Increase Insulin Glargine from 44 to 52 units (split into 2 separate injections of 26 units, each)  -Start 4 units of Humalog before Breakfast   -Start 4 units of Humalog before Dinner   Will request patient schedule 30 minute visit for diet review, due to post-meal elevations        Patient Instructions Reviewed  -Continue to check blood sugars 4 times daily (fasting and 2 hours after first bite of each meal) and report readings in [facility] (Track My Health)  -Message diabetes team on Thursday 5/08 to review sugars again  -Call office if blood sugar < 60 mg/dL, 160 mg/dL twice in the same day, or <200 mg/dL (after washing hands a second time and retesting)  *Goal for blood sugars 60 -120 mg/dl     Kristy Laguna, ANNIAN, LDN

## 2025-05-06 ENCOUNTER — ULTRASOUND (OUTPATIENT)
Dept: PERINATAL CARE | Facility: CLINIC | Age: 35
End: 2025-05-06
Payer: COMMERCIAL

## 2025-05-06 VITALS
OXYGEN SATURATION: 98 % | BODY MASS INDEX: 41.15 KG/M2 | WEIGHT: 241 LBS | DIASTOLIC BLOOD PRESSURE: 66 MMHG | HEIGHT: 64 IN | HEART RATE: 84 BPM | SYSTOLIC BLOOD PRESSURE: 122 MMHG

## 2025-05-06 DIAGNOSIS — Z3A.35 35 WEEKS GESTATION OF PREGNANCY: ICD-10-CM

## 2025-05-06 DIAGNOSIS — O24.414 INSULIN CONTROLLED GESTATIONAL DIABETES MELLITUS (GDM) IN THIRD TRIMESTER: Primary | ICD-10-CM

## 2025-05-06 PROCEDURE — 59025 FETAL NON-STRESS TEST: CPT | Performed by: PHYSICIAN ASSISTANT

## 2025-05-06 PROCEDURE — 99214 OFFICE O/P EST MOD 30 MIN: CPT | Performed by: PHYSICIAN ASSISTANT

## 2025-05-06 PROCEDURE — 76815 OB US LIMITED FETUS(S): CPT | Performed by: PHYSICIAN ASSISTANT

## 2025-05-06 RX ORDER — INSULIN LISPRO 100 [IU]/ML
4 INJECTION, SOLUTION INTRAVENOUS; SUBCUTANEOUS
Qty: 15 ML | Refills: 0 | Status: CANCELLED | OUTPATIENT
Start: 2025-05-06 | End: 2025-06-10

## 2025-05-06 RX ORDER — INSULIN ASPART 100 [IU]/ML
4 INJECTION, SOLUTION INTRAVENOUS; SUBCUTANEOUS
Qty: 15 ML | Refills: 0 | Status: SHIPPED | OUTPATIENT
Start: 2025-05-06

## 2025-05-06 NOTE — PROGRESS NOTES
[facility]:     Ms. Maria was seen today at 35w0d gestational age for NST (found under the pregnancy episode). I reviewed the RN assessment and agree. She was also seen for SHOLA (see ultrasound report under OB procedures tab).     Anahi Castlilo PA-C      I spent 10 minutes devoted to patient care (3 min chart preparation, 4 minutes face to face and 3 minutes documenting).

## 2025-05-06 NOTE — PROGRESS NOTES
Repeat Non-Stress Testing:    Patient verbalizes +FM. Pt denies ALL:               Leaking of fluid   Contractions   Vaginal bleeding   Decreased fetal movement    Patient is performing daily kick counts. Patient has no questions or concerns.   NST strip reviewed by FABIEN Mariscal in-person.

## 2025-05-08 ENCOUNTER — TELEPHONE (OUTPATIENT)
Dept: OBGYN CLINIC | Facility: MEDICAL CENTER | Age: 35
End: 2025-05-08

## 2025-05-09 ENCOUNTER — ROUTINE PRENATAL (OUTPATIENT)
Dept: PERINATAL CARE | Facility: CLINIC | Age: 35
End: 2025-05-09
Payer: COMMERCIAL

## 2025-05-09 VITALS
BODY MASS INDEX: 41.35 KG/M2 | SYSTOLIC BLOOD PRESSURE: 114 MMHG | HEIGHT: 64 IN | WEIGHT: 242.2 LBS | HEART RATE: 104 BPM | DIASTOLIC BLOOD PRESSURE: 66 MMHG

## 2025-05-09 DIAGNOSIS — Z3A.35 35 WEEKS GESTATION OF PREGNANCY: ICD-10-CM

## 2025-05-09 DIAGNOSIS — O24.414 INSULIN CONTROLLED GESTATIONAL DIABETES MELLITUS (GDM) IN THIRD TRIMESTER: ICD-10-CM

## 2025-05-09 DIAGNOSIS — Z3A.35 35 WEEKS GESTATION OF PREGNANCY: Primary | ICD-10-CM

## 2025-05-09 DIAGNOSIS — O24.414 INSULIN CONTROLLED GESTATIONAL DIABETES MELLITUS (GDM) IN THIRD TRIMESTER: Primary | ICD-10-CM

## 2025-05-09 PROCEDURE — 59025 FETAL NON-STRESS TEST: CPT | Performed by: STUDENT IN AN ORGANIZED HEALTH CARE EDUCATION/TRAINING PROGRAM

## 2025-05-09 RX ORDER — INSULIN GLARGINE 100 [IU]/ML
INJECTION, SOLUTION SUBCUTANEOUS
Qty: 15 ML | Refills: 0 | Status: SHIPPED | OUTPATIENT
Start: 2025-05-09 | End: 2025-06-10

## 2025-05-09 NOTE — PROGRESS NOTES
Repeat Non-Stress Testing:    Patient verbalizes +FM. Pt denies ALL:               Leaking of fluid   Contractions   Vaginal bleeding   Decreased fetal movement    Patient is performing daily kick counts. Patient has no questions or concerns.   NST strip reviewed by Dr. Meehan in-person.  
Clothing

## 2025-05-09 NOTE — PATIENT INSTRUCTIONS
Thank you for choosing us for your  care today.  If you have any questions about your ultrasound or care, please do not hesitate to contact us or your primary obstetrician.        Some general instructions for your pregnancy are:    Exercise: Aim for 150 minutes per week of regular exercise.  Walking is great!  Nutrition: Choose healthy sources of calcium, iron, and protein.  Avoid ultraprocessed foods and added sugar.  Learn about Preeclampsia: preeclampsia is a common, potentially serious high blood pressure complication in pregnancy.  A blood pressure of 140mmHg (systolic or top number) or 90mmHg (diastolic or bottom number) should be evaluated by your doctor.  Aspirin is sometimes prescribed in early pregnancy to prevent preeclampsia in women with risk factors - ask your obstetrician if you should be on this medication.  For more resources, visit:  https://www.highriskpregnancyinfo.org/preeclampsia  If you smoke, please try to quit completely but also try to reduce your smoking by as much as possible (as soon as possible).  Do not vape.  Please also avoid cannabis products.  Other warning signs to watch out for in pregnancy or postpartum: chest pain, obstructed breathing or shortness of breath, seizures, thoughts of hurting yourself or your baby, bleeding, a painful or swollen leg, fever, or headache (see [facility] POST-BIRTH Warning Signs campaign).  If these happen call 911.  Itching is also not normal in pregnancy and if you experience this, especially over your hands and feet, potentially worse at night, notify your doctors.     Kick Counts in Pregnancy   AMBULATORY CARE:   Kick counts  measure how much your baby is moving in your womb. A kick from your baby can be felt as a twist, turn, swish, roll, or jab. It is common to feel your baby kicking at 26 to 28 weeks of pregnancy. You may feel your baby kick as early as 20 weeks of pregnancy. You may want to start counting at 28 weeks.   Contact your  doctor immediately if:   You feel a change in the number of kicks or movements of your baby.      You feel fewer than 10 kicks within 2 hours.      You have questions or concerns about your baby's movements.     Why measure kick counts:  Your baby's movement may provide information about your baby's health. He or she may move less, or not at all, if there are problems. Your baby may move less if he or she is not getting enough oxygen or nutrition from the placenta. Do not smoke while you are pregnant. Smoking decreases the amount of oxygen that gets to your baby. Talk to your healthcare provider if you need help to quit smoking. Tell your healthcare provider as soon as you feel a change in your baby's movements.  When to measure kick counts:   Measure kick counts at the same time every day.       Measure kick counts when your baby is awake and most active. Your baby may be most active in the evening.     How to measure kick counts:  Check that your baby is awake before you measure kick counts. You can wake up your baby by lightly pushing on your belly, walking, or drinking something cold. Your healthcare provider may tell you different ways to measure kick counts. You may be told to do the following:  Use a chart or clock to keep track of the time you start and finish counting.      Sit in a chair or lie on your left side.      Place your hands on the largest part of your belly.      Count until you reach 10 kicks. Write down how much time it takes to count 10 kicks.      It may take 30 minutes to 2 hours to count 10 kicks. It should not take more than 2 hours to count 10 kicks.     Follow up with your doctor as directed:  Write down your questions so you remember to ask them during your visits.   © Copyright [employer] 2023 Information is for End User's use only and may not be sold, redistributed or otherwise used for commercial purposes.  The above information is an  only. It is not intended as  medical advice for individual conditions or treatments. Talk to your doctor, nurse or pharmacist before following any medical regimen to see if it is safe and effective for you. Nonstress Test for Pregnancy   WHAT YOU NEED TO KNOW:   What do I need to know about a nonstress test?  A nonstress test measures your baby's heart rate and movements. Nonstress means that no stress will be placed on your baby during the test.  How do I prepare for a nonstress test?  Your healthcare provider will talk to you about how to prepare for this test. Your provider may tell you to eat and drink plenty of liquids before your test. If you smoke, you may be asked not to smoke within 2 hours before the test. Your provider will also tell you which medicines to take or not take on the day of your test.  What will happen during a nonstress test?  You may be asked to lie down or recline back for the test on a bed. One or 2 belts with sensors will be placed around your abdomen. Your baby's heart rate will be recorded with a machine. If your baby does not move, your baby may be asleep. Your healthcare provider may make a noise near your abdomen to try to wake your baby. The test usually takes about 20 minutes, but can take longer if your baby needs to be awakened.        What do I need to know about the test results?  Your baby will be expected to move at least 2 times for a certain amount of time. Your baby's heart rate will be expected to go up by a certain number of beats per minute during movement. If your baby does not move as expected, the test may need to be repeated or you may need other tests.  CARE AGREEMENT:   You have the right to help plan your care. Learn about your health condition and how it may be treated. Discuss treatment options with your healthcare providers to decide what care you want to receive. You always have the right to refuse treatment. The above information is an  only. It is not intended as  medical advice for individual conditions or treatments. Talk to your doctor, nurse or pharmacist before following any medical regimen to see if it is safe and effective for you.  © 2023 Information is for End User's use only and may not be sold, redistributed or otherwise used for commercial purposes. Thank you for choosing us for your  care today.  If you have any questions about your ultrasound or care, please do not hesitate to contact us or your primary obstetrician.        Some general instructions for your pregnancy are:    Exercise: Aim for 150 minutes per week of regular exercise.  Walking is great!  Nutrition: Choose healthy sources of calcium, iron, and protein.  Avoid ultraprocessed foods and added sugar.  Learn about Preeclampsia: preeclampsia is a common, potentially serious high blood pressure complication in pregnancy.  A blood pressure of 140mmHg (systolic or top number) or 90mmHg (diastolic or bottom number) should be evaluated by your doctor.  Aspirin is sometimes prescribed in early pregnancy to prevent preeclampsia in women with risk factors - ask your obstetrician if you should be on this medication.  For more resources, visit:  https://www.highriskpregnancyinfo.org/preeclampsia  If you smoke, please try to quit completely but also try to reduce your smoking by as much as possible (as soon as possible).  Do not vape.  Please also avoid cannabis products.  Other warning signs to watch out for in pregnancy or postpartum: chest pain, obstructed breathing or shortness of breath, seizures, thoughts of hurting yourself or your baby, bleeding, a painful or swollen leg, fever, or headache (see [facility] POST-BIRTH Warning Signs campaign).  If these happen call 911.  Itching is also not normal in pregnancy and if you experience this, especially over your hands and feet, potentially worse at night, notify your doctors.

## 2025-05-09 NOTE — PATIENT INSTRUCTIONS
Thank you for choosing us for your  care today.  If you have any questions about your ultrasound or care, please do not hesitate to contact us or your primary obstetrician.        Some general instructions for your pregnancy are:    Exercise: Aim for 150 minutes per week of regular exercise.  Walking is great!  Nutrition: Choose healthy sources of calcium, iron, and protein.  Avoid ultraprocessed foods and added sugar.  Learn about Preeclampsia: preeclampsia is a common, potentially serious high blood pressure complication in pregnancy.  A blood pressure of 140mmHg (systolic or top number) or 90mmHg (diastolic or bottom number) should be evaluated by your doctor.  Aspirin is sometimes prescribed in early pregnancy to prevent preeclampsia in women with risk factors - ask your obstetrician if you should be on this medication.  For more resources, visit:  https://www.highriskpregnancyinfo.org/preeclampsia  If you smoke, please try to quit completely but also try to reduce your smoking by as much as possible (as soon as possible).  Do not vape.  Please also avoid cannabis products.  Other warning signs to watch out for in pregnancy or postpartum: chest pain, obstructed breathing or shortness of breath, seizures, thoughts of hurting yourself or your baby, bleeding, a painful or swollen leg, fever, or headache (see [facility] POST-[facility] Warning Sign[facility]).  If these happen call 911.  Itching is also not normal in pregnancy and if you experience this, especially over your hands and feet, potentially worse at night, notify your doctors.     Kick Counts in Pregnancy   AMBULATORY CARE:   Kick counts  measure how much your baby is moving in your womb. A kick from your baby can be felt as a twist, turn, swish, roll, or jab. It is common to feel your baby kicking at 26 to 28 weeks of pregnancy. You may feel your baby kick as early as 20 weeks of pregnancy. You may want to start counting at 28 weeks.   Contact your  doctor immediately if:   You feel a change in the number of kicks or movements of your baby.      You feel fewer than 10 kicks within 2 hours.      You have questions or concerns about your baby's movements.     Why measure kick counts:  Your baby's movement may provide information about your baby's health. He or she may move less, or not at all, if there are problems. Your baby may move less if he or she is not getting enough oxygen or nutrition from the placenta. Do not smoke while you are pregnant. Smoking decreases the amount of oxygen that gets to your baby. Talk to your healthcare provider if you need help to quit smoking. Tell your healthcare provider as soon as you feel a change in your baby's movements.  When to measure kick counts:   Measure kick counts at the same time every day.       Measure kick counts when your baby is awake and most active. Your baby may be most active in the evening.     How to measure kick counts:  Check that your baby is awake before you measure kick counts. You can wake up your baby by lightly pushing on your belly, walking, or drinking something cold. Your healthcare provider may tell you different ways to measure kick counts. You may be told to do the following:  Use a chart or clock to keep track of the time you start and finish counting.      Sit in a chair or lie on your left side.      Place your hands on the largest part of your belly.      Count until you reach 10 kicks. Write down how much time it takes to count 10 kicks.      It may take 30 minutes to 2 hours to count 10 kicks. It should not take more than 2 hours to count 10 kicks.     Follow up with your doctor as directed:  Write down your questions so you remember to ask them during your visits.   © Copyright [employer] 2023 Information is for End User's use only and may not be sold, redistributed or otherwise used for commercial purposes.  The above information is an  only. It is not intended as  medical advice for individual conditions or treatments. Talk to your doctor, nurse or pharmacist before following any medical regimen to see if it is safe and effective for you. Nonstress Test for Pregnancy   WHAT YOU NEED TO KNOW:   What do I need to know about a nonstress test?  A nonstress test measures your baby's heart rate and movements. Nonstress means that no stress will be placed on your baby during the test.  How do I prepare for a nonstress test?  Your healthcare provider will talk to you about how to prepare for this test. Your provider may tell you to eat and drink plenty of liquids before your test. If you smoke, you may be asked not to smoke within 2 hours before the test. Your provider will also tell you which medicines to take or not take on the day of your test.  What will happen during a nonstress test?  You may be asked to lie down or recline back for the test on a bed. One or 2 belts with sensors will be placed around your abdomen. Your baby's heart rate will be recorded with a machine. If your baby does not move, your baby may be asleep. Your healthcare provider may make a noise near your abdomen to try to wake your baby. The test usually takes about 20 minutes, but can take longer if your baby needs to be awakened.        What do I need to know about the test results?  Your baby will be expected to move at least 2 times for a certain amount of time. Your baby's heart rate will be expected to go up by a certain number of beats per minute during movement. If your baby does not move as expected, the test may need to be repeated or you may need other tests.  CARE AGREEMENT:   You have the right to help plan your care. Learn about your health condition and how it may be treated. Discuss treatment options with your healthcare providers to decide what care you want to receive. You always have the right to refuse treatment. The above information is an  only. It is not intended as  medical advice for individual conditions or treatments. Talk to your doctor, nurse or pharmacist before following any medical regimen to see if it is safe and effective for you.  © Copyright [employer] 2023 Information is for End User's use only and may not be sold, redistributed or otherwise used for commercial purposes.

## 2025-05-12 ENCOUNTER — ROUTINE PRENATAL (OUTPATIENT)
Dept: PERINATAL CARE | Facility: CLINIC | Age: 35
End: 2025-05-12
Payer: COMMERCIAL

## 2025-05-12 VITALS
HEART RATE: 88 BPM | HEIGHT: 64 IN | SYSTOLIC BLOOD PRESSURE: 106 MMHG | DIASTOLIC BLOOD PRESSURE: 58 MMHG | BODY MASS INDEX: 40.94 KG/M2 | WEIGHT: 239.8 LBS

## 2025-05-12 DIAGNOSIS — O24.414 INSULIN CONTROLLED GESTATIONAL DIABETES MELLITUS (GDM) IN THIRD TRIMESTER: ICD-10-CM

## 2025-05-12 DIAGNOSIS — Z3A.35 35 WEEKS GESTATION OF PREGNANCY: Primary | ICD-10-CM

## 2025-05-12 PROBLEM — A49.1 GBS (GROUP B STREPTOCOCCUS) INFECTION: Status: ACTIVE | Noted: 2025-05-12

## 2025-05-12 PROCEDURE — 59025 FETAL NON-STRESS TEST: CPT | Performed by: OBSTETRICS & GYNECOLOGY

## 2025-05-12 NOTE — PROGRESS NOTES
Repeat Non-Stress Testing:    Patient verbalizes +FM. Pt denies ALL:               Leaking of fluid   Contractions   Vaginal bleeding   Decreased fetal movement    Patient is performing daily kick counts. Patient has no questions or concerns.   NST strip reviewed by Dr. Parikh in-person.

## 2025-05-12 NOTE — PROGRESS NOTES
This patient received  care under my supervision at [facility].  NST is reactive.  -Carolyne Parikh MD

## 2025-05-13 PROBLEM — Z3A.37 37 WEEKS GESTATION OF PREGNANCY: Status: ACTIVE | Noted: 2025-01-11

## 2025-05-13 NOTE — PROGRESS NOTES
Routine Prenatal Visit  [facility] [address], [place], [place]      OB/GYN Prenatal Visit    ASSESSMENT / PLAN:  1. 35 weeks gestation of pregnancy  2. History of pre-eclampsia in prior pregnancy, currently pregnant in second trimester  Assessment & Plan:  Baseline labs are normal   3. Insulin controlled gestational diabetes mellitus (GDM) in third trimester  Assessment & Plan:  Currently checking in with mfm  She has uncontrolled sugars  Recommend IOL at 38 weeks   Message sent         Lab Results   Component Value Date    HGBA1C 6.3 (H) 2025     4. Macrosomia of fetus affecting management of mother in third trimester, single or unspecified fetus  Assessment & Plan:  EFW is 92%  AC is 99% at 32 weeks    Has repeat growth in 4 weeks      Shoulder dystocia discussed again and the concerns for such       5. Obesity in pregnancy  Assessment & Plan:  Starting BMI 35  15.3 kg (33 lb 12.8 oz)      6. GBS (group B streptococcus) infection  Assessment & Plan:  Treat in labor         SUBJECTIVE:  Ciera Maria is a 35 y.o.  at 37 here for prenatal visit.  She has no obstetric complaints and denies pelvic pain, cramping/contractions, vaginal bleeding, loss of fluid, or decreased fetal movement.       The following portions of the patient's history were reviewed and updated as appropriate: allergies, current medications, past family history, past medical history, obstetric history, gynecologic history, past social history, past surgical history and problem list.      Objective:  /60   Wt 109 kg (240 lb)   LMP 2024   BMI 41.20 kg/m²   Pregravid Weight/BMI: 93.4 kg (206 lb) (BMI 35.34)  Current Weight: 109 kg (240 lb)   Total Weight Gain: 15.4 kg (34 lb)   Pre- Vitals      Flowsheet Row Most Recent Value   Prenatal Assessment    Fetal Heart Rate 141   Movement Present   Prenatal Vitals    Blood Pressure 110/60   Weight - Scale 109 kg (240 lb)   Urine  Albumin/Glucose    Dilation/Effacement/Station    Cervical Dilation .5   Cervical Effacement 50   Fetal Station -3   Vaginal Drainage    Draining Fluid No   Edema    LLE Edema None   RLE Edema None   Facial Edema None               Physical Exam:    General: Well appearing, no distress  Respiratory: Unlabored breathing  Cardiovascular: Regular rate.  Abdomen: Soft, gravid, nontender  Fundal Height: Appropriate for gestational age.  Extremities: Warm and well perfused.  Non tender.      Kareem Vela MD

## 2025-05-13 NOTE — ASSESSMENT & PLAN NOTE
EFW is 92%  AC is 99% at 32 weeks    Has repeat growth in 4 weeks      Shoulder dystocia discussed again and the concerns for such

## 2025-05-13 NOTE — ASSESSMENT & PLAN NOTE
Currently checking in with mfm  She has uncontrolled sugars  Recommend IOL at 38 weeks   Message sent         Lab Results   Component Value Date    HGBA1C 6.3 (H) 03/31/2025

## 2025-05-14 ENCOUNTER — TELEPHONE (OUTPATIENT)
Dept: OBGYN CLINIC | Facility: CLINIC | Age: 35
End: 2025-05-14

## 2025-05-14 NOTE — TELEPHONE ENCOUNTER
Who called:STAFF     Is the patient Pregnant ? yes  If so, How many weeks? 37 wks    Reason for the Call: to reschedule patients 05/21 appt    Action Taken: LVM to reschedule appt    Outcome/Plan/ Recommendations:  To reschedule patients appt to another day.

## 2025-05-15 ENCOUNTER — ROUTINE PRENATAL (OUTPATIENT)
Dept: OBGYN CLINIC | Facility: MEDICAL CENTER | Age: 35
End: 2025-05-15

## 2025-05-15 VITALS — SYSTOLIC BLOOD PRESSURE: 110 MMHG | WEIGHT: 240 LBS | BODY MASS INDEX: 41.2 KG/M2 | DIASTOLIC BLOOD PRESSURE: 60 MMHG

## 2025-05-15 DIAGNOSIS — O09.292 HISTORY OF PRE-ECLAMPSIA IN PRIOR PREGNANCY, CURRENTLY PREGNANT IN SECOND TRIMESTER: ICD-10-CM

## 2025-05-15 DIAGNOSIS — O24.414 INSULIN CONTROLLED GESTATIONAL DIABETES MELLITUS (GDM) IN THIRD TRIMESTER: ICD-10-CM

## 2025-05-15 DIAGNOSIS — O99.210 OBESITY IN PREGNANCY: ICD-10-CM

## 2025-05-15 DIAGNOSIS — Z3A.35 35 WEEKS GESTATION OF PREGNANCY: Primary | ICD-10-CM

## 2025-05-15 DIAGNOSIS — O36.63X0 MACROSOMIA OF FETUS AFFECTING MANAGEMENT OF MOTHER IN THIRD TRIMESTER, SINGLE OR UNSPECIFIED FETUS: ICD-10-CM

## 2025-05-15 DIAGNOSIS — A49.1 GBS (GROUP B STREPTOCOCCUS) INFECTION: ICD-10-CM

## 2025-05-15 PROCEDURE — PNV: Performed by: OBSTETRICS & GYNECOLOGY

## 2025-05-15 NOTE — PATIENT INSTRUCTIONS
Thank you for choosing us for your  care today.  If you have any questions about your ultrasound or care, please do not hesitate to contact us or your primary obstetrician.        Some general instructions for your pregnancy are:    Exercise: Aim for 150 minutes per week of regular exercise.  Walking is great!  Nutrition: Choose healthy sources of calcium, iron, and protein.  Avoid ultraprocessed foods and added sugar.  Learn about Preeclampsia: preeclampsia is a common, potentially serious high blood pressure complication in pregnancy.  A blood pressure of 140mmHg (systolic or top number) or 90mmHg (diastolic or bottom number) should be evaluated by your doctor.  Aspirin is sometimes prescribed in early pregnancy to prevent preeclampsia in women with risk factors - ask your obstetrician if you should be on this medication.  For more resources, visit:  https://www.highriskpregnancyinfo.org/preeclampsia  If you smoke, please try to quit completely but also try to reduce your smoking by as much as possible (as soon as possible).  Do not vape.  Please also avoid cannabis products.  Other warning signs to watch out for in pregnancy or postpartum: chest pain, obstructed breathing or shortness of breath, seizures, thoughts of hurting yourself or your baby, bleeding, a painful or swollen leg, fever, or headache (see [facility]).  If these happen call 911.  Itching is also not normal in pregnancy and if you experience this, especially over your hands and feet, potentially worse at night, notify your doctors.     Kick Counts in Pregnancy   AMBULATORY CARE:   Kick counts  measure how much your baby is moving in your womb. A kick from your baby can be felt as a twist, turn, swish, roll, or jab. It is common to feel your baby kicking at 26 to 28 weeks of pregnancy. You may feel your baby kick as early as 20 weeks of pregnancy. You may want to start counting at 28 weeks.   Contact your  doctor immediately if:   You feel a change in the number of kicks or movements of your baby.      You feel fewer than 10 kicks within 2 hours.      You have questions or concerns about your baby's movements.     Why measure kick counts:  Your baby's movement may provide information about your baby's health. He or she may move less, or not at all, if there are problems. Your baby may move less if he or she is not getting enough oxygen or nutrition from the placenta. Do not smoke while you are pregnant. Smoking decreases the amount of oxygen that gets to your baby. Talk to your healthcare provider if you need help to quit smoking. Tell your healthcare provider as soon as you feel a change in your baby's movements.  When to measure kick counts:   Measure kick counts at the same time every day.       Measure kick counts when your baby is awake and most active. Your baby may be most active in the evening.     How to measure kick counts:  Check that your baby is awake before you measure kick counts. You can wake up your baby by lightly pushing on your belly, walking, or drinking something cold. Your healthcare provider may tell you different ways to measure kick counts. You may be told to do the following:  Use a chart or clock to keep track of the time you start and finish counting.      Sit in a chair or lie on your left side.      Place your hands on the largest part of your belly.      Count until you reach 10 kicks. Write down how much time it takes to count 10 kicks.      It may take 30 minutes to 2 hours to count 10 kicks. It should not take more than 2 hours to count 10 kicks.     Follow up with your doctor as directed:  Write down your questions so you remember to ask them during your visits.   © Copyright [employer] 2023 Information is for End User's use only and may not be sold, redistributed or otherwise used for commercial purposes.  The above information is an  only. It is not intended as  medical advice for individual conditions or treatments. Talk to your doctor, nurse or pharmacist before following any medical regimen to see if it is safe and effective for you. Nonstress Test for Pregnancy   WHAT YOU NEED TO KNOW:   What do I need to know about a nonstress test?  A nonstress test measures your baby's heart rate and movements. Nonstress means that no stress will be placed on your baby during the test.  How do I prepare for a nonstress test?  Your healthcare provider will talk to you about how to prepare for this test. Your provider may tell you to eat and drink plenty of liquids before your test. If you smoke, you may be asked not to smoke within 2 hours before the test. Your provider will also tell you which medicines to take or not take on the day of your test.  What will happen during a nonstress test?  You may be asked to lie down or recline back for the test on a bed. One or 2 belts with sensors will be placed around your abdomen. Your baby's heart rate will be recorded with a machine. If your baby does not move, your baby may be asleep. Your healthcare provider may make a noise near your abdomen to try to wake your baby. The test usually takes about 20 minutes, but can take longer if your baby needs to be awakened.        What do I need to know about the test results?  Your baby will be expected to move at least 2 times for a certain amount of time. Your baby's heart rate will be expected to go up by a certain number of beats per minute during movement. If your baby does not move as expected, the test may need to be repeated or you may need other tests.  CARE AGREEMENT:   You have the right to help plan your care. Learn about your health condition and how it may be treated. Discuss treatment options with your healthcare providers to decide what care you want to receive. You always have the right to refuse treatment. The above information is an  only. It is not intended as  medical advice for individual conditions or treatments. Talk to your doctor, nurse or pharmacist before following any medical regimen to see if it is safe and effective for you.  © 2023 Information is for End User's use only and may not be sold, redistributed or otherwise used for commercial purposes. Thank you for choosing us for your  care today.  If you have any questions about your ultrasound or care, please do not hesitate to contact us or your primary obstetrician.        Some general instructions for your pregnancy are:    Exercise: Aim for 150 minutes per week of regular exercise.  Walking is great!  Nutrition: Choose healthy sources of calcium, iron, and protein.  Avoid ultraprocessed foods and added sugar.  Learn about Preeclampsia: preeclampsia is a common, potentially serious high blood pressure complication in pregnancy.  A blood pressure of 140mmHg (systolic or top number) or 90mmHg (diastolic or bottom number) should be evaluated by your doctor.  Aspirin is sometimes prescribed in early pregnancy to prevent preeclampsia in women with risk factors - ask your obstetrician if you should be on this medication.  For more resources, visit:  https://www.highriskpregnancyinfo.org/preeclampsia  If you smoke, please try to quit completely but also try to reduce your smoking by as much as possible (as soon as possible).  Do not vape.  Please also avoid cannabis products.  Other warning signs to watch out for in pregnancy or postpartum: chest pain, obstructed breathing or shortness of breath, seizures, thoughts of hurting yourself or your baby, bleeding, a painful or swollen leg, fever, or headache (see [facility] POST-BIRTH Warning Signs campaign).  If these happen call 911.  Itching is also not normal in pregnancy and if you experience this, especially over your hands and feet, potentially worse at night, notify your doctors.

## 2025-05-16 ENCOUNTER — ULTRASOUND (OUTPATIENT)
Dept: PERINATAL CARE | Facility: CLINIC | Age: 35
End: 2025-05-16
Payer: COMMERCIAL

## 2025-05-16 VITALS
WEIGHT: 239.6 LBS | HEART RATE: 90 BPM | HEIGHT: 64 IN | SYSTOLIC BLOOD PRESSURE: 126 MMHG | BODY MASS INDEX: 40.9 KG/M2 | DIASTOLIC BLOOD PRESSURE: 68 MMHG

## 2025-05-16 DIAGNOSIS — Z3A.36 36 WEEKS GESTATION OF PREGNANCY: Primary | ICD-10-CM

## 2025-05-16 DIAGNOSIS — O24.414 INSULIN CONTROLLED GESTATIONAL DIABETES MELLITUS (GDM) IN THIRD TRIMESTER: ICD-10-CM

## 2025-05-16 PROCEDURE — 59025 FETAL NON-STRESS TEST: CPT | Performed by: STUDENT IN AN ORGANIZED HEALTH CARE EDUCATION/TRAINING PROGRAM

## 2025-05-16 PROCEDURE — 99214 OFFICE O/P EST MOD 30 MIN: CPT | Performed by: STUDENT IN AN ORGANIZED HEALTH CARE EDUCATION/TRAINING PROGRAM

## 2025-05-16 PROCEDURE — 76815 OB US LIMITED FETUS(S): CPT | Performed by: STUDENT IN AN ORGANIZED HEALTH CARE EDUCATION/TRAINING PROGRAM

## 2025-05-16 NOTE — LETTER
May 16, 2025      DIABETES EDUCATORS AND NUTRITIONISTS    Patient: Ciera Maria   YOB: 1990   Date of Visit: 2025       Dear   DIABETES EDUCATORS AND NUTRITIONISTS:    Thank you for referring Ciera Maria to me for evaluation. Below are my notes for this consultation.    Due to hyperglycemia, lantus was changed from 55u to 64u nightly.    If you have questions, please do not hesitate to call me. I look forward to following your patient along with you.         Sincerely,        Leanna Meehan MD        CC: No Recipients    Leanna Meehan MD  2025  9:57 AM  Sign when Signing Visit  [facility]: Ms. Maria was seen today for NST (found under the pregnancy episode) which I reviewed the RN assessment and agree, and SHOLA (see ultrasound report under OB procedures tab).       MDM:   I. Diagnoses/Problems addressed:  Moderate  II.  Data: Moderate  III.  Risk of morbidity: Moderate    Please don't hesitate to contact our office with any concerns or questions.  -Leanna Meehan MD

## 2025-05-16 NOTE — PROGRESS NOTES
[facility]: Ms. Maria was seen today for NST (found under the pregnancy episode) which I reviewed the RN assessment and agree, and SHOLA (see ultrasound report under OB procedures tab).       MDM:   I. Diagnoses/Problems addressed:  Moderate  II.  Data: Moderate  III.  Risk of morbidity: Moderate    Please don't hesitate to contact our office with any concerns or questions.  -Leanna Meehan MD

## 2025-05-19 ENCOUNTER — PATIENT MESSAGE (OUTPATIENT)
Dept: OBGYN CLINIC | Facility: MEDICAL CENTER | Age: 35
End: 2025-05-19

## 2025-05-19 ENCOUNTER — ROUTINE PRENATAL (OUTPATIENT)
Dept: PERINATAL CARE | Facility: CLINIC | Age: 35
End: 2025-05-19
Payer: COMMERCIAL

## 2025-05-19 ENCOUNTER — TREATMENT (OUTPATIENT)
Dept: PERINATAL CARE | Facility: CLINIC | Age: 35
End: 2025-05-19

## 2025-05-19 VITALS
HEIGHT: 64 IN | BODY MASS INDEX: 41.07 KG/M2 | HEART RATE: 86 BPM | SYSTOLIC BLOOD PRESSURE: 110 MMHG | WEIGHT: 240.6 LBS | DIASTOLIC BLOOD PRESSURE: 62 MMHG

## 2025-05-19 DIAGNOSIS — Z3A.36 36 WEEKS GESTATION OF PREGNANCY: Primary | ICD-10-CM

## 2025-05-19 DIAGNOSIS — Z3A.36 36 WEEKS GESTATION OF PREGNANCY: ICD-10-CM

## 2025-05-19 DIAGNOSIS — O24.414 INSULIN CONTROLLED GESTATIONAL DIABETES MELLITUS (GDM) IN THIRD TRIMESTER: Primary | ICD-10-CM

## 2025-05-19 DIAGNOSIS — Z79.4 INSULIN DOSE CHANGED (HCC): ICD-10-CM

## 2025-05-19 DIAGNOSIS — O24.414 INSULIN CONTROLLED GESTATIONAL DIABETES MELLITUS (GDM) IN THIRD TRIMESTER: ICD-10-CM

## 2025-05-19 PROCEDURE — 59025 FETAL NON-STRESS TEST: CPT | Performed by: STUDENT IN AN ORGANIZED HEALTH CARE EDUCATION/TRAINING PROGRAM

## 2025-05-19 PROCEDURE — 99214 OFFICE O/P EST MOD 30 MIN: CPT | Performed by: STUDENT IN AN ORGANIZED HEALTH CARE EDUCATION/TRAINING PROGRAM

## 2025-05-19 NOTE — LETTER
May 19, 2025     Kareem Vela MD  [address] [place] [address]  [address] [place] [address]    Patient: Ciera Maria   YOB: 1990   Date of Visit: 5/19/2025       Dear Dr. Kareem Vela MD:    Thank you for referring Ciera Maria to me for evaluation. Below are my notes for this consultation.    She is a GDMA2 at 36w6d. Her control remains poor despite increasing insulin doses. Delivery is recommended anytime after 37w0d. She is scheduled at 38w1d, which can be moved sooner to the 37th week.    She sees you on 5/21.    If you have questions, please do not hesitate to call me. I look forward to following your patient along with you.         Sincerely,        Leanna Meehan MD        CC: No Recipients    Leanna Meehan MD  5/19/2025  9:52 AM  Sign when Signing Visit  Ciera and I discussed her gestational diabetes A2.  Her current insulin regimen is 64 units Lantus nightly and NovoLog 4 units with breakfast and dinner.  Her glucose log was reviewed.  Despite escalating insulin doses, her control is poor.  She reports a fasting of 111 today, which is the lowest she has noted this pregnancy.  She reports adhering to a diabetic diet to the best of her ability.    38-week delivery was initially planned due to suboptimal glycemic control.  Given her current degree of elevations despite frequent insulin increases, delivery is recommended as early as 37w0d.  Her induction, currently scheduled for 38w1d, can be moved to a sooner available date.    Leanna Meehan MD  Maternal-Fetal Medicine

## 2025-05-19 NOTE — PATIENT INSTRUCTIONS
Changes made to regimen today: (per Adela Argueta; discussed patient over Epic secure chat)  - Increase Lantus from 64 to 80 units (split into 2 separate injections of 40 units, each). Set an alarm to inject at the same time every night.   - Increase Novolog from 4-0-4 to 10-4-10 units before meals 1-2-3 (B/L/D). Inject 15 minutes before meal. Hold if not eating.     Additional Instructions:   - Be consistent with timing of meals and snacks. Eat every 2-3.5hrs. Avoid exceeding 3.5hrs without eating.  - Always have a bedtime snack including 15g of carbohydrate and 2-3oz of protein. Review your booklet for examples.   - Fast (no food/only water) at least 8hrs but no more than 10hrs overnight.  - Use the 15-15 rule to treat a low blood sugar <60 or <80 with symptoms. Call [phone] to notify the diabetes team if you experience a blood sugar <60.  - Continue to check blood sugars 4 times daily (fasting and 2 hours after first bite of each meal) and report readings in [employer] (Track My Health)  - Message diabetes team on Thursday 5/22 to review sugars again  - Call office if blood sugar < 60 mg/dL, 160 mg/dL twice in the same day, or <200 mg/dL (after washing hands a second time and retesting)  *Goal for blood sugars 60 -120 mg/dl

## 2025-05-19 NOTE — PROGRESS NOTES
Ciera Maria is a 35 y.o. year-old female para  with an GUY of 6/10/2025, by Ultrasound at 36w6d weeks gestation.     Diabetes Type: Gestational      Current Outpatient Medications on File Prior to Visit   Medication Sig    albuterol (ProAir HFA) 90 mcg/act inhaler Inhale 2 puffs every 6 (six) hours as needed for wheezing    ascorbic acid (VITAMIN C) 500 mg tablet Take 500 mg by mouth in the morning.    aspirin 81 mg chewable tablet Chew 162 mg in the morning. every night. (Patient not taking: Reported on 2025)    Blood Glucose Monitoring Suppl (Contour Next EZ) w/Device KIT Test x4 Daily or as instructed    clotrimazole-betamethasone (LOTRISONE) 1-0.05 % cream Apply topically 2 (two) times a day (Patient taking differently: Apply topically in the morning and in the evening. PRN .)    Contour Next Test test strip Test 4 Times Daily or as instructed    docusate sodium (COLACE) 100 mg capsule Take 100 mg by mouth in the morning and 100 mg in the evening.    famotidine (Pepcid) 20 mg tablet Take 20 mg by mouth as needed for heartburn or indigestion    ferrous sulfate 324 (65 Fe) mg Take 1 tablet (324 mg total) by mouth every other day    Insulin Pen Needle (Pen Needles) 31G X 5 MM MISC Use with insulin pen once daily at bedtime    Lantus SoloStar 100 units/mL SOPN Inject 52 units once daily at bedtime; Titrate as instructed (Patient taking differently: Inject 64 units once daily at bedtime; Titrate as instructed)    Microlet Lancets MISC Use 4 a Day or as instructed    NovoLOG FlexPen 100 units/mL injection pen Inject 4 Units under the skin 2 (two) times a day before meals - Inject 4 units, 15 minutes before the start of breakfast and dinner. Hold if not eating. Titrate dose as instructed.    Prenatal Vit-Fe Fumarate-FA (PRENATAL PO) Take 1 tablet by mouth in the morning    Probiotic Product (PROBIOTIC DAILY PO) Take 1 tablet by mouth in the morning     Current insulin regimen:   Lantus 64 units once  "daily, at bedtime  Novolog 4 units before breakfast and dinner     Most recent A1C:   Lab Results   Component Value Date    HGBA1C 6.3 (H) 03/31/2025     Goal for A1c for this patient is 5.6% or less.      Last US date: 4/30/25  Findings: AC 98%, EFW 92% (possible macrosomia), Amniotic Fluid = Normal     Review of recent blood sugars reveals: Elevated fasting and post-meal blood sugars    (5/19/2025) per pt: \"I started Lantus 64 on Friday night 5/16/25 per Dr. Meehan's instructions.  I have missed my dinner dose of Novolog on 5/17/25 and 5/18/25.\"        Changes made to regimen today: (per Adela Argueta; discussed patient over Epic secure chat)  - Increase Lantus from 64 to 80 units (split into 2 separate injections of 40 units, each). Set an alarm to inject at the same time every night.   - Increase Novolog from 4-0-4 to 10-4-10 units before meals 1-2-3 (B/L/D). Inject 15 minutes before meal. Hold if not eating.     Additional Instructions:   - Be consistent with timing of meals and snacks. Eat every 2-3.5hrs. Avoid exceeding 3.5hrs without eating.  - Always have a bedtime snack including 15g of carbohydrate and 2-3oz of protein. Review your booklet for examples.   - Fast (no food/only water) at least 8hrs but no more than 10hrs overnight.  - Use the 15-15 rule to treat a low blood sugar <60 or <80 with symptoms. Call [phone] to notify the diabetes team if you experience a blood sugar <60.  - Continue to check blood sugars 4 times daily (fasting and 2 hours after first bite of each meal) and report readings in [employer] (Track My Health)  - Message diabetes team on Thursday 5/22 to review sugars again  - Call office if blood sugar < 60 mg/dL, 160 mg/dL twice in the same day, or <200 mg/dL (after washing hands a second time and retesting)  *Goal for blood sugars 60 -120 mg/dl     Sarah Wiley MS RD  Diabetes Educator  [facility]  [facility] [place]  [address], [address] [place] [address]   Dept: [phone]  Dept Fax: [phone]

## 2025-05-19 NOTE — PROGRESS NOTES
Ciera and I discussed her gestational diabetes A2.  Her current insulin regimen is 64 units Lantus nightly and NovoLog 4 units with breakfast and dinner.  Her glucose log was reviewed.  Despite escalating insulin doses, her control is poor.  She reports a fasting of 111 today, which is the lowest she has noted this pregnancy.  She reports adhering to a diabetic diet to the best of her ability.    38-week delivery was initially planned due to suboptimal glycemic control.  Given her current degree of elevations despite frequent insulin increases, delivery is recommended as early as 37w0d.  Her induction, currently scheduled for 38w1d, can be moved to a sooner available date.    Leanna Meehan MD  Maternal-Fetal Medicine

## 2025-05-21 ENCOUNTER — ROUTINE PRENATAL (OUTPATIENT)
Dept: OBGYN CLINIC | Facility: MEDICAL CENTER | Age: 35
End: 2025-05-21

## 2025-05-21 VITALS — DIASTOLIC BLOOD PRESSURE: 60 MMHG | WEIGHT: 241 LBS | SYSTOLIC BLOOD PRESSURE: 104 MMHG | BODY MASS INDEX: 41.37 KG/M2

## 2025-05-21 DIAGNOSIS — A49.1 GBS (GROUP B STREPTOCOCCUS) INFECTION: ICD-10-CM

## 2025-05-21 DIAGNOSIS — O09.292 HISTORY OF PRE-ECLAMPSIA IN PRIOR PREGNANCY, CURRENTLY PREGNANT IN SECOND TRIMESTER: ICD-10-CM

## 2025-05-21 DIAGNOSIS — Z3A.37 37 WEEKS GESTATION OF PREGNANCY: Primary | ICD-10-CM

## 2025-05-21 DIAGNOSIS — O36.63X0 MACROSOMIA OF FETUS AFFECTING MANAGEMENT OF MOTHER IN THIRD TRIMESTER, SINGLE OR UNSPECIFIED FETUS: ICD-10-CM

## 2025-05-21 DIAGNOSIS — O24.414 INSULIN CONTROLLED GESTATIONAL DIABETES MELLITUS (GDM) IN THIRD TRIMESTER: ICD-10-CM

## 2025-05-21 DIAGNOSIS — O99.210 OBESITY IN PREGNANCY: ICD-10-CM

## 2025-05-21 PROCEDURE — PNV: Performed by: OBSTETRICS & GYNECOLOGY

## 2025-05-21 NOTE — PROGRESS NOTES
Routine Prenatal Visit  [facility] [address], [place], [place]      OB/GYN Prenatal Visit    ASSESSMENT / PLAN:  1. 37 weeks gestation of pregnancy  2. GBS (group B streptococcus) infection  Assessment & Plan:  Treat in labor   3. History of pre-eclampsia in prior pregnancy, currently pregnant in second trimester  Assessment & Plan:  Baseline labs are normal   4. Insulin controlled gestational diabetes mellitus (GDM) in third trimester  Assessment & Plan:  Currently checking in with mfm  She has uncontrolled sugars  Recommend IOL at 37 weeks - per MFM lorenzo note on 25           Lab Results   Component Value Date    HGBA1C 6.3 (H) 2025     5. Macrosomia of fetus affecting management of mother in third trimester, single or unspecified fetus  Assessment & Plan:  EFW is 92%  AC is 99% at 32 weeks    Has repeat growth in 4 weeks      Shoulder dystocia discussed again and the concerns for such       6. Obesity in pregnancy  Assessment & Plan:  Starting BMI 35  15.7 kg (34 lb 9.6 oz)            SUBJECTIVE:  Ciera Maria is a 35 y.o.  at 37 here for prenatal visit.  She has no obstetric complaints and denies pelvic pain, cramping/contractions, vaginal bleeding, loss of fluid, or decreased fetal movement.         The following portions of the patient's history were reviewed and updated as appropriate: allergies, current medications, past family history, past medical history, obstetric history, gynecologic history, past social history, past surgical history and problem list.      Objective:  /60   Wt 109 kg (241 lb)   LMP 2024   BMI 41.37 kg/m²   Pregravid Weight/BMI: 93.4 kg (206 lb) (BMI 35.34)  Current Weight: 109 kg (241 lb)   Total Weight Gain: 15.9 kg (35 lb)   Pre-Kathryn Vitals      Flowsheet Row Most Recent Value   Prenatal Assessment    Fetal Heart Rate 152   Movement Present   Prenatal Vitals    Blood Pressure 104/60   Weight - Scale 109  kg (241 lb)   Urine Albumin/Glucose    Dilation/Effacement/Station    Cervical Dilation .5   Cervical Effacement 50   Fetal Station -4   Vaginal Drainage    Draining Fluid No   Edema    LLE Edema Trace   RLE Edema Trace   Facial Edema Trace               Physical Exam:    General: Well appearing, no distress  Respiratory: Unlabored breathing  Cardiovascular: Regular rate.  Abdomen: Soft, gravid, nontender  Fundal Height: Appropriate for gestational age.  Extremities: Warm and well perfused.  Non tender.      Kareem Vela MD

## 2025-05-21 NOTE — ASSESSMENT & PLAN NOTE
Currently checking in with mfm  She has uncontrolled sugars  Recommend IOL at 37 weeks - per MFM lorenzo note on 5/19/25           Lab Results   Component Value Date    HGBA1C 6.3 (H) 03/31/2025

## 2025-05-22 ENCOUNTER — APPOINTMENT (INPATIENT)
Dept: LABOR AND DELIVERY | Facility: HOSPITAL | Age: 35
End: 2025-05-22
Payer: COMMERCIAL

## 2025-05-22 ENCOUNTER — HOSPITAL ENCOUNTER (INPATIENT)
Facility: HOSPITAL | Age: 35
LOS: 3 days | Discharge: HOME | End: 2025-05-25
Attending: OBSTETRICS & GYNECOLOGY | Admitting: OBSTETRICS & GYNECOLOGY
Payer: COMMERCIAL

## 2025-05-22 ENCOUNTER — ANESTHESIA EVENT (INPATIENT)
Dept: LABOR AND DELIVERY | Facility: HOSPITAL | Age: 35
End: 2025-05-22
Payer: COMMERCIAL

## 2025-05-22 ENCOUNTER — HOSPITAL ENCOUNTER (OUTPATIENT)
Dept: LABOR AND DELIVERY | Facility: HOSPITAL | Age: 35
Discharge: HOME | End: 2025-05-22

## 2025-05-22 ENCOUNTER — ANESTHESIA (INPATIENT)
Dept: LABOR AND DELIVERY | Facility: HOSPITAL | Age: 35
End: 2025-05-22
Payer: COMMERCIAL

## 2025-05-22 DIAGNOSIS — Z3A.37 37 WEEKS GESTATION OF PREGNANCY: Primary | ICD-10-CM

## 2025-05-22 PROBLEM — O32.0XX0 UNSTABLE LIE OF FETUS: Status: ACTIVE | Noted: 2025-05-22

## 2025-05-22 LAB
ABO GROUP BLD: NORMAL
BLD GP AB SCN SERPL QL: NEGATIVE
ERYTHROCYTE [DISTWIDTH] IN BLOOD BY AUTOMATED COUNT: 14.9 % (ref 11.6–15.1)
GLUCOSE SERPL-MCNC: 123 MG/DL (ref 65–140)
GLUCOSE SERPL-MCNC: 69 MG/DL (ref 65–140)
GLUCOSE SERPL-MCNC: 86 MG/DL (ref 65–140)
GLUCOSE SERPL-MCNC: 92 MG/DL (ref 65–140)
GLUCOSE SERPL-MCNC: 93 MG/DL (ref 65–140)
HCT VFR BLD AUTO: 33.2 % (ref 34.8–46.1)
HGB BLD-MCNC: 11.1 G/DL (ref 11.5–15.4)
HOLD SPECIMEN: YES
MCH RBC QN AUTO: 28.5 PG (ref 26.8–34.3)
MCHC RBC AUTO-ENTMCNC: 33.4 G/DL (ref 31.4–37.4)
MCV RBC AUTO: 85 FL (ref 82–98)
PLATELET # BLD AUTO: 212 THOUSANDS/UL (ref 149–390)
PMV BLD AUTO: 9.9 FL (ref 8.9–12.7)
RBC # BLD AUTO: 3.89 MILLION/UL (ref 3.81–5.12)
RH BLD: POSITIVE
SPECIMEN EXPIRATION DATE: NORMAL
TREPONEMA PALLIDUM IGG+IGM AB [PRESENCE] IN SERUM OR PLASMA BY IMMUNOASSAY: NORMAL
WBC # BLD AUTO: 11.18 THOUSAND/UL (ref 4.31–10.16)

## 2025-05-22 PROCEDURE — 80053 COMPREHEN METABOLIC PANEL: CPT

## 2025-05-22 PROCEDURE — 82948 REAGENT STRIP/BLOOD GLUCOSE: CPT

## 2025-05-22 PROCEDURE — 86901 BLOOD TYPING SEROLOGIC RH(D): CPT

## 2025-05-22 PROCEDURE — 86900 BLOOD TYPING SEROLOGIC ABO: CPT

## 2025-05-22 PROCEDURE — NC001 PR NO CHARGE: Performed by: OBSTETRICS & GYNECOLOGY

## 2025-05-22 PROCEDURE — 85027 COMPLETE CBC AUTOMATED: CPT

## 2025-05-22 PROCEDURE — 86780 TREPONEMA PALLIDUM: CPT

## 2025-05-22 PROCEDURE — 86850 RBC ANTIBODY SCREEN: CPT

## 2025-05-22 RX ORDER — ONDANSETRON 2 MG/ML
4 INJECTION INTRAMUSCULAR; INTRAVENOUS EVERY 6 HOURS PRN
Status: DISCONTINUED | OUTPATIENT
Start: 2025-05-22 | End: 2025-05-23

## 2025-05-22 RX ORDER — TERBUTALINE SULFATE 1 MG/ML
0.25 INJECTION SUBCUTANEOUS ONCE
Status: DISCONTINUED | OUTPATIENT
Start: 2025-05-22 | End: 2025-05-22

## 2025-05-22 RX ORDER — BUPIVACAINE HYDROCHLORIDE 2.5 MG/ML
30 INJECTION, SOLUTION EPIDURAL; INFILTRATION; INTRACAUDAL; PERINEURAL ONCE AS NEEDED
Status: DISCONTINUED | OUTPATIENT
Start: 2025-05-22 | End: 2025-05-22

## 2025-05-22 RX ORDER — SODIUM CHLORIDE, SODIUM LACTATE, POTASSIUM CHLORIDE, CALCIUM CHLORIDE 600; 310; 30; 20 MG/100ML; MG/100ML; MG/100ML; MG/100ML
125 INJECTION, SOLUTION INTRAVENOUS CONTINUOUS
Status: DISCONTINUED | OUTPATIENT
Start: 2025-05-22 | End: 2025-05-22

## 2025-05-22 RX ORDER — SODIUM CHLORIDE 9 MG/ML
125 INJECTION, SOLUTION INTRAVENOUS CONTINUOUS
Status: DISCONTINUED | OUTPATIENT
Start: 2025-05-22 | End: 2025-05-23

## 2025-05-22 RX ORDER — ALBUTEROL SULFATE 90 UG/1
2 INHALANT RESPIRATORY (INHALATION) EVERY 6 HOURS PRN
Status: DISCONTINUED | OUTPATIENT
Start: 2025-05-22 | End: 2025-05-25 | Stop reason: HOSPADM

## 2025-05-22 RX ADMIN — Medication 50 MCG: at 20:12

## 2025-05-22 RX ADMIN — SODIUM CHLORIDE 2.5 MILLION UNITS: 9 INJECTION, SOLUTION INTRAVENOUS at 19:48

## 2025-05-22 RX ADMIN — Medication 25 MCG: at 15:32

## 2025-05-22 RX ADMIN — SODIUM CHLORIDE 5 MILLION UNITS: 0.9 INJECTION, SOLUTION INTRAVENOUS at 15:45

## 2025-05-22 RX ADMIN — SODIUM CHLORIDE 125 ML/HR: 0.9 INJECTION, SOLUTION INTRAVENOUS at 15:02

## 2025-05-22 NOTE — PLAN OF CARE
Problem: PAIN - ADULT  Goal: Verbalizes/displays adequate comfort level or baseline comfort level  Description: Interventions:  - Encourage patient to monitor pain and request assistance  - Assess pain using appropriate pain scale  - Administer analgesics as ordered based on type and severity of pain and evaluate response  - Implement non-pharmacological measures as appropriate and evaluate response  - Consider cultural and social influences on pain and pain management  - Notify physician/advanced practitioner if interventions unsuccessful or patient reports new pain  - Educate patient/family on pain management process including their role and importance of  reporting pain   - Provide non-pharmacologic/complimentary pain relief interventions  Outcome: Progressing     Problem: INFECTION - ADULT  Goal: Absence or prevention of progression during hospitalization  Description: INTERVENTIONS:  - Assess and monitor for signs and symptoms of infection  - Monitor lab/diagnostic results  - Monitor all insertion sites, i.e. indwelling lines, tubes, and drains  - Monitor endotracheal if appropriate and nasal secretions for changes in amount and color  - Orange appropriate cooling/warming therapies per order  - Administer medications as ordered  - Instruct and encourage patient and family to use good hand hygiene technique  - Identify and instruct in appropriate isolation precautions for identified infection/condition  Outcome: Progressing  Goal: Absence of fever/infection during neutropenic period  Description: INTERVENTIONS:  - Monitor WBC  - Perform strict hand hygiene  - Limit to healthy visitors only  - No plants, dried, fresh or silk flowers with arita in patient room  Outcome: Progressing     Problem: SAFETY ADULT  Goal: Patient will remain free of falls  Description: INTERVENTIONS:  - Educate patient/family on patient safety including physical limitations  - Instruct patient to call for assistance with activity   -  Consider consulting OT/PT to assist with strengthening/mobility based on AM PAC & JH-HLM score  - Consult OT/PT to assist with strengthening/mobility   - Keep Call bell within reach  - Keep bed low and locked with side rails adjusted as appropriate  - Keep care items and personal belongings within reach  - Initiate and maintain comfort rounds  - Make Fall Risk Sign visible to staff  -Outcome: Progressing  Goal: Maintain or return to baseline ADL function  Description: INTERVENTIONS:  -  Assess patient's ability to carry out ADLs; assess patient's baseline for ADL function and identify physical deficits which impact ability to perform ADLs (bathing, care of mouth/teeth, toileting, grooming, dressing, etc.)  - Assess/evaluate cause of self-care deficits   - Assess range of motion  - Assess patient's mobility; develop plan if impaired  - Assess patient's need for assistive devices and provide as appropriate  - Encourage maximum independence but intervene and supervise when necessary  - Involve family in performance of ADLs  - Assess for home care needs following discharge   - Consider OT consult to assist with ADL evaluation and planning for discharge  - Provide patient education as appropriate  - Monitor functional capacity and physical performance, use of AM PAC & JH-HLM   - Monitor gait, balance and fatigue with ambulation    Outcome: Progressing  Goal: Maintains/Returns to pre admission functional level  Description: INTERVENTIONS:  - Perform AM-PAC 6 Click Basic Mobility/ Daily Activity assessment daily.  - Set and communicate daily mobility goal to care team and patient/family/caregiver.   - Collaborate with rehabilitation services on mobility goals if consulted    - Out of bed for toileting  - Record patient progress and toleration of activity level   Outcome: Progressing     Problem: DISCHARGE PLANNING  Goal: Discharge to home or other facility with appropriate resources  Description: INTERVENTIONS:  - Identify  barriers to discharge w/patient and caregiver  - Arrange for needed discharge resources and transportation as appropriate  - Identify discharge learning needs (meds, wound care, etc.)  - Arrange for interpretive services to assist at discharge as needed  - Refer to Case Management Department for coordinating discharge planning if the patient needs post-hospital services based on physician/advanced practitioner order or complex needs related to functional status, cognitive ability, or social support system  Outcome: Progressing     Problem: Knowledge Deficit  Goal: Patient/family/caregiver demonstrates understanding of disease process, treatment plan, medications, and discharge instructions  Description: Complete learning assessment and assess knowledge base.  Interventions:  - Provide teaching at level of understanding  - Provide teaching via preferred learning methods  Outcome: Progressing

## 2025-05-22 NOTE — ANESTHESIA PREPROCEDURE EVALUATION
Procedure:   SECTION () (Uterus)    Relevant Problems   GYN   (+) 37 weeks gestation of pregnancy      PULMONARY   (+) Asthma   (+) Mild persistent asthma without complication        Physical Exam    Airway     Mallampati score: II  TM Distance: >3 FB  Neck ROM: full      Cardiovascular  Rhythm: regular, Rate: normal, Pulse is palpable.     Dental       Pulmonary   Breath sounds clear to auscultation    Neurological    She appears oriented x3.      Other Findings  post-pubertal.      Anesthesia Plan  ASA Score- 2     Anesthesia Type- epidural with ASA Monitors.         Additional Monitors:     Airway Plan:            Plan Factors-    Chart reviewed.   Existing labs reviewed. Patient summary reviewed.    Patient is not a current smoker.              Induction-     Postoperative Plan- .   Monitoring Plan - Monitoring plan - standard ASA monitoring      Perioperative Resuscitation Plan - Level 1 - Full Code.       Informed Consent- Anesthetic plan and risks discussed with patient.        NPO Status:  No vitals data found for the desired time range.

## 2025-05-22 NOTE — ASSESSMENT & PLAN NOTE
Patient initially presenting for IOL in the setting of poorly controlled A2GDM  Found to be breech presentation on admission, was cephalic on Monday, transverse on 5/6  Discussed risks and benefits of external cephalic version with patient and factors contributing to a successful ECV  Patient would like to proceed with ECV. Last ate at 7:30 AM, plan for ECV at 4PM  If successful, plan for IOL, otherwise patient amenable to c section  Anesthesia aware, sips of water until 2PM and then NPO

## 2025-05-22 NOTE — OB LABOR/OXYTOCIN SAFETY PROGRESS
Labor Progress Note - Ciera Maria 35 y.o. female MRN: [id number]    Unit/Bed#: -01 Encounter: [id number]       Contraction Frequency (minutes): occasional  Contraction Intensity: Mild  Uterine Activity Characteristics: Regular  Cervical Dilation: Fingertip        Cervical Effacement: 50  Fetal Station: Ballotable  Baseline Rate (FHR): 115 bpm  Fetal Heart Rate (FHT): 115 BPM  FHR Category: 1               Vital Signs:   Vitals:    05/22/25 1903   BP: 116/64   Pulse: 73   Resp: 16   Temp: 98.1 °F (36.7 °C)   SpO2: 98%     Patient due for check now 4 hours s/p cytotec. Vertex presentation confirmed again with bedside TAUS. SVE unchanged. Plan for additional dose of cytotec. D/w Dr. Limon.     Rona Lim MD 5/22/2025 7:57 PM

## 2025-05-22 NOTE — H&P
H&P - OB/GYN   Name: Ciera Maria 35 y.o. female I MRN: [id number]  Unit/Bed#: -01 I Date of Admission: 5/22/2025   Date of Service: 5/22/2025 I Hospital Day: 0     Assessment & Plan  Unstable lie of fetus  Patient initially presenting for IOL in the setting of poorly controlled A2GDM  Found to be breech presentation on admission, was cephalic on Monday, transverse on 5/6  Discussed risks and benefits of external cephalic version with patient and factors contributing to a successful ECV  Patient would like to proceed with ECV. Last ate at 7:30 AM, plan for ECV at 4PM  If successful, plan for IOL, otherwise patient amenable to c section  Anesthesia aware, sips of water until 2PM and then NPO  Insulin controlled gestational diabetes mellitus (GDM) in third trimester  Lab Results   Component Value Date    HGBA1C 6.3 (H) 03/31/2025     Recent Labs     05/22/25  0935   POCGLU 123     Blood Sugar Average: Last 72 hrs:  (P) 123 - 2 hours postprandial    GDM protocol ordered  Patient on 10-4-10u Novolog with B/L/D, 80u Lantus qHS  Poorly controlled outpatient and reason for recommended delivery at 37 weeks  Macrosomia of fetus affecting management of mother in third trimester  Measurements at 34w1d:     AC             32.80 cm        36 weeks 5 days* (98%)  BPD             8.74 cm        35 weeks 2 days* (80%)  HC             32.39 cm        36 weeks 4 days* (77%)  Femur           6.77 cm        34 weeks 6 days* (58%)     EFW Hadlock 4   2840 grams - 6 lbs 4 oz                 (92%)     THE AVERAGE GESTATIONAL AGE is 35 weeks 6 days +/- 21 days.  37 weeks gestation of pregnancy  Up to date on prenatal care  GBS (group B streptococcus) infection  Will start penicillin prophylaxis if ECV successful  Mild persistent asthma without complication  Albuterol PRN ordered  History of pre-eclampsia in prior pregnancy, currently pregnant in second trimester  Blood pressures wnl this pregnancy  Continue to monitor for  symptoms of preeclampsia    History of Present Illness   Patient of: [facility]  Brief Summary: Ciera Maria  with an GUY of 6/10/2025, by Ultrasound at 37w2d presenting initially for induction of labor in the setting of poorly controlled A2GDM, though found to be breech presentation on admission.     Chief Complaint: here for IOL     HPI:  Contractions: None.   Leakage of fluid: None.   Bleeding: None.   Fetal movement: present.    Pregnancy complications: A2GDM, fetal macrosomia.     Review of Systems   Constitutional:  Negative for chills and fever.   HENT: Negative.     Respiratory:  Negative for cough and shortness of breath.    Cardiovascular:  Negative for chest pain.   Gastrointestinal:  Negative for abdominal pain, nausea and vomiting.   Genitourinary: Negative.  Negative for vaginal bleeding.   Musculoskeletal: Negative.    Neurological:  Negative for headaches.   Psychiatric/Behavioral: Negative.         Historical Information   Historical Information   Past Medical History[1]  Past Surgical History[2]  Social History[3]  E-Cigarette/Vaping    E-Cigarette Use Never User      E-Cigarette/Vaping Substances    Nicotine No     THC No     CBD No     Flavoring No     Other No     Unknown No      Social History[4]  Prior to Admission Medications   Prescriptions Last Dose Informant Patient Reported? Taking?   Blood Glucose Monitoring Suppl (Contour Next EZ) w/Device KIT   No No   Sig: Test x4 Daily or as instructed   Contour Next Test test strip   No No   Sig: Test 4 Times Daily or as instructed   Insulin Pen Needle (Pen Needles) 31G X 5 MM MISC   No No   Sig: Use with insulin pen once daily at bedtime   Lantus SoloStar 100 units/mL SOPN  Self No No   Sig: Inject 52 units once daily at bedtime; Titrate as instructed   Patient taking differently: Inject 80 units once daily at bedtime; Titrate as instructed   Microlet Lancets MISC   No No   Sig: Use 4 a Day or as instructed   NovoLOG  FlexPen 100 units/mL injection pen   No No   Sig: Inject 4 Units under the skin 2 (two) times a day before meals - Inject 4 units, 15 minutes before the start of breakfast and dinner. Hold if not eating. Titrate dose as instructed.   Prenatal Vit-Fe Fumarate-FA (PRENATAL PO)   Yes No   Sig: Take 1 tablet by mouth in the morning   Probiotic Product (PROBIOTIC DAILY PO)   Yes No   Sig: Take 1 tablet by mouth in the morning   albuterol (ProAir HFA) 90 mcg/act inhaler   No No   Sig: Inhale 2 puffs every 6 (six) hours as needed for wheezing   ascorbic acid (VITAMIN C) 500 mg tablet  Self Yes No   Sig: Take 500 mg by mouth in the morning.   aspirin 81 mg chewable tablet   Yes No   Sig: Chew 162 mg in the morning. every night.   Patient not taking: Reported on 2025   clotrimazole-betamethasone (LOTRISONE) 1-0.05 % cream  Self No No   Sig: Apply topically 2 (two) times a day   Patient taking differently: Apply topically in the morning and in the evening. PRN .   docusate sodium (COLACE) 100 mg capsule   Yes No   Sig: Take 100 mg by mouth in the morning and 100 mg in the evening.   famotidine (Pepcid) 20 mg tablet   Yes No   Sig: Take 20 mg by mouth as needed for heartburn or indigestion   ferrous sulfate 324 (65 Fe) mg   No No   Sig: Take 1 tablet (324 mg total) by mouth every other day      Facility-Administered Medications: None     Patient has no known allergies.  OB History    Para Term  AB Living   4 2 2  1 2   SAB IAB Ectopic Multiple Live Births   1   0 2      # Outcome Date GA Lbr Emigdio/2nd Weight Sex Type Anes PTL Lv   4 Current            3 SAB  7w0d          2 Term 17 37w2d / 00:18 3322 g (7 lb 5.2 oz) M Vag-Spont EPI N KITTY      Birth Comments: none   1 Term 11 40w0d  3487 g (7 lb 11 oz) M Vag-Spont EPI N KITTY      Obstetric Comments   Preeclampsia, third trimester     Baby complications/comments: fetal macrosomia    Objective :  Temp:  [98.3 °F (36.8 °C)] 98.3 °F (36.8 °C)  HR:   "[91] 91  BP: (104-125)/(60-78) 125/78  Resp:  [16] 16    Physical Exam  Constitutional:       General: She is not in acute distress.  HENT:      Head: Normocephalic and atraumatic.     Cardiovascular:      Rate and Rhythm: Normal rate.   Pulmonary:      Effort: Pulmonary effort is normal. No respiratory distress.   Abdominal:      General: There is no distension.      Palpations: Abdomen is soft.      Tenderness: There is no abdominal tenderness.     Skin:     General: Skin is warm and dry.     Neurological:      General: No focal deficit present.     Psychiatric:         Mood and Affect: Mood normal.          Contractions:  No contractions  Fetal heart rate  Baseline 150bpm / moderate variability / 15 x 15 accelerations present / no decelerations    Lab Results: I have reviewed the following results:  Strep Grp B PCR   Date Value Ref Range Status   03/16/2017 Positive for Beta Hemolytic Strep Grp B by PCR (A)  Final     No results found for: \"STREPGPB\"  RPR   Date Value Ref Range Status   03/27/2017 Non-Reactive Non-Reactive Final     HEPATITIS B SURFACE ANTIGEN   Date Value Ref Range Status   02/11/2014 Nonreactive (NR Nonreactive (NR),Nonreactive- confirmed (NR) Final     Comment:     Nonreactive (NR)  The above 1 analytes were performed by [facility] [place] [address]       Hepatitis B Surface Ag   Date Value Ref Range Status   12/03/2024 Non-reactive Non-Reactive Final     No components found for: \"EXTHEPBSAG\"  HEPATITIS C ANTIBODY   Date Value Ref Range Status   02/11/2014 Nonreactive (NR Nonreactive (NR) Final     Comment:     Nonreactive (NR)  The above 1 analytes were performed by [facility] [address]       Hepatitis C Ab   Date Value Ref Range Status   12/03/2024 Non-reactive Non-Reactive Final     Rubella IgG Quant   Date Value Ref Range Status   12/03/2024 48.0 >14.9 IU/mL Final     No results found for: \"EXTRUBELIGGQ\"  HIV-1/HIV-2 Ab   Date Value Ref Range " "Status   09/01/2016 Non-Reactive Non-Reactive Final     No components found for: \"GUGBRD6GC\"  N GONORRHOEAE, AMPLIFIED DNA   Date Value Ref Range Status   03/21/2014 Negative Negative Final     Comment:     The above 2 analytes were performed by [facility]  [address] [place] [id number]       N gonorrhoeae, DNA Probe   Date Value Ref Range Status   12/19/2024 Negative Negative Final   10/06/2016 N. gonorrhoeae Amplified DNA Negative N. gonorrhoeae Amplified DNA Negative Final     CHLAMYDIA,AMPLIFIED DNA PROBE   Date Value Ref Range Status   03/21/2014 Negative (quali Negative Final     Comment:     Negative (qualifier value)     Chlamydia, DNA Probe   Date Value Ref Range Status   10/06/2016 C. trachomatis Amplified DNA Negative C. trachomatis Amplified DNA Negative Final     Chlamydia trachomatis, DNA Probe   Date Value Ref Range Status   12/19/2024 Negative Negative Final       Type & Screen:  ABO Grouping   Date Value Ref Range Status   05/22/2025 O  Final     Rh Factor   Date Value Ref Range Status   05/22/2025 Positive  Final     No results found for: \"ANTIBODYSCR\"  Specimen Expiration Date   Date Value Ref Range Status   05/22/2025 [id number]  Final       Dennise Montes MD  OBGYN, PGY-2  05/22/25  10:42 AM         [1]   Past Medical History:  Diagnosis Date    Allergic rhinitis     Asthma     dx as child    Elevated hemoglobin A1c     Gestational hypertension     A2GDM 2nd pregnancy    H/O postpartum hemorrhage, currently pregnant     with both pregnancies    Headache     Hx of preeclampsia, prior pregnancy, currently pregnant     Iron deficiency anemia     PO Fe in 3rd trimester    Prediabetes 05/18/2023    Urinary tract infection     Varicella     had vaccine    Visual impairment     Vitamin D deficiency     PO D3 OTC   [2]   Past Surgical History:  Procedure Laterality Date    CHOLECYSTECTOMY      WISDOM TOOTH EXTRACTION     [3]   Social History  Tobacco Use    Smoking status: Never    " Smokeless tobacco: Never   Vaping Use    Vaping status: Never Used   Substance and Sexual Activity    Alcohol use: Not Currently     Comment: social - rarely    Drug use: No    Sexual activity: Yes     Partners: Male     Birth control/protection: None   [4]   Social History  Tobacco Use    Smoking status: Never    Smokeless tobacco: Never   Vaping Use    Vaping status: Never Used   Substance and Sexual Activity    Alcohol use: Not Currently     Comment: social - rarely    Drug use: No    Sexual activity: Yes     Partners: Male     Birth control/protection: None

## 2025-05-22 NOTE — ASSESSMENT & PLAN NOTE
Lab Results   Component Value Date    HGBA1C 6.3 (H) 03/31/2025     Recent Labs     05/22/25  0935   POCGLU 123     Blood Sugar Average: Last 72 hrs:  (P) 123 - 2 hours postprandial    GDM protocol ordered  Patient on 10-4-10u Novolog with B/L/D, 80u Lantus qHS  Poorly controlled outpatient and reason for recommended delivery at 37 weeks

## 2025-05-22 NOTE — OB LABOR/OXYTOCIN SAFETY PROGRESS
Oxytocin Safety Progress Check Note - Ciera Maria 35 y.o. female MRN: [id number]    Unit/Bed#: -01 Encounter: [id number]       Contraction Frequency (minutes): absent        Cervical Dilation: Fingertip        Cervical Effacement: 50  Fetal Station: Ballotable  Baseline Rate (FHR): 150 bpm  Fetal Heart Rate (FHT): 150 BPM  FHR Category: 1    Vital Signs:   Vitals:    05/22/25 0900   BP:    Pulse:    Resp:    Temp: 98.3 °F (36.8 °C)       Notes/comments:     Ciera endorsed feeling a big movement. On TAUS, she is vertex. Discussed that she has the potential for revert to breech again. On SVE she is 0.5/50/-4. Vaginal cytotec placed. D/W Dr. Limon.       Luzma Roberts MD 5/22/2025 3:30 PM

## 2025-05-22 NOTE — ASSESSMENT & PLAN NOTE
Measurements at 34w1d:     AC             32.80 cm        36 weeks 5 days* (98%)  BPD             8.74 cm        35 weeks 2 days* (80%)  HC             32.39 cm        36 weeks 4 days* (77%)  Femur           6.77 cm        34 weeks 6 days* (58%)     EFW Hadlock 4   2840 grams - 6 lbs 4 oz                 (92%)     THE AVERAGE GESTATIONAL AGE is 35 weeks 6 days +/- 21 days.

## 2025-05-23 ENCOUNTER — ANESTHESIA (INPATIENT)
Dept: ANESTHESIOLOGY | Facility: HOSPITAL | Age: 35
End: 2025-05-23
Payer: COMMERCIAL

## 2025-05-23 ENCOUNTER — ANESTHESIA EVENT (INPATIENT)
Dept: ANESTHESIOLOGY | Facility: HOSPITAL | Age: 35
End: 2025-05-23
Payer: COMMERCIAL

## 2025-05-23 LAB
ALBUMIN SERPL BCG-MCNC: 3.3 G/DL (ref 3.5–5)
ALP SERPL-CCNC: 131 U/L (ref 34–104)
ALT SERPL W P-5'-P-CCNC: 13 U/L (ref 7–52)
ANION GAP SERPL CALCULATED.3IONS-SCNC: 8 MMOL/L (ref 4–13)
AST SERPL W P-5'-P-CCNC: 14 U/L (ref 13–39)
BASE EXCESS BLDCOA CALC-SCNC: -5.6 MMOL/L (ref 3–11)
BASE EXCESS BLDCOV CALC-SCNC: -5.1 MMOL/L (ref 1–9)
BILIRUB SERPL-MCNC: 0.43 MG/DL (ref 0.2–1)
BUN SERPL-MCNC: 6 MG/DL (ref 5–25)
CALCIUM ALBUM COR SERPL-MCNC: 9.8 MG/DL (ref 8.3–10.1)
CALCIUM SERPL-MCNC: 9.2 MG/DL (ref 8.4–10.2)
CHLORIDE SERPL-SCNC: 106 MMOL/L (ref 96–108)
CO2 SERPL-SCNC: 23 MMOL/L (ref 21–32)
CREAT SERPL-MCNC: 0.53 MG/DL (ref 0.6–1.3)
GFR SERPL CREATININE-BSD FRML MDRD: 124 ML/MIN/1.73SQ M
GLUCOSE SERPL-MCNC: 106 MG/DL (ref 65–140)
GLUCOSE SERPL-MCNC: 146 MG/DL (ref 65–140)
GLUCOSE SERPL-MCNC: 62 MG/DL (ref 65–140)
GLUCOSE SERPL-MCNC: 64 MG/DL (ref 65–140)
GLUCOSE SERPL-MCNC: 67 MG/DL (ref 65–140)
GLUCOSE SERPL-MCNC: 74 MG/DL (ref 65–140)
GLUCOSE SERPL-MCNC: 75 MG/DL (ref 65–140)
GLUCOSE SERPL-MCNC: 77 MG/DL (ref 65–140)
GLUCOSE SERPL-MCNC: 77 MG/DL (ref 65–140)
HCO3 BLDCOA-SCNC: 20.2 MMOL/L (ref 17.3–27.3)
HCO3 BLDCOV-SCNC: 19 MMOL/L (ref 12.2–28.6)
O2 CT VFR BLDCOA CALC: 16.3 ML/DL
OXYHGB MFR BLDCOA: 66.4 %
OXYHGB MFR BLDCOV: 73.7 %
PCO2 BLDCOA: 40.7 MM[HG] (ref 30–60)
PCO2 BLDCOV: 33.4 MM HG (ref 27–43)
PH BLDCOA: 7.31 [PH] (ref 7.23–7.43)
PH BLDCOV: 7.37 [PH] (ref 7.19–7.49)
PO2 BLDCOA: 27.8 MM HG (ref 5–25)
PO2 BLDCOV: 31 MM HG (ref 15–45)
POTASSIUM SERPL-SCNC: 3.4 MMOL/L (ref 3.5–5.3)
PROT SERPL-MCNC: 6.2 G/DL (ref 6.4–8.4)
SAO2 % BLDCOV: 17.7 ML/DL
SODIUM SERPL-SCNC: 137 MMOL/L (ref 135–147)

## 2025-05-23 PROCEDURE — 59400 OBSTETRICAL CARE: CPT | Performed by: OBSTETRICS & GYNECOLOGY

## 2025-05-23 PROCEDURE — 82948 REAGENT STRIP/BLOOD GLUCOSE: CPT

## 2025-05-23 PROCEDURE — 0U7C7DJ DILATION OF CERVIX WITH INTRALUM DEV, TEMP, VIA OPENING: ICD-10-PCS | Performed by: OBSTETRICS & GYNECOLOGY

## 2025-05-23 PROCEDURE — 0HQ9XZZ REPAIR PERINEUM SKIN, EXTERNAL APPROACH: ICD-10-PCS | Performed by: OBSTETRICS & GYNECOLOGY

## 2025-05-23 PROCEDURE — 82805 BLOOD GASES W/O2 SATURATION: CPT | Performed by: OBSTETRICS & GYNECOLOGY

## 2025-05-23 PROCEDURE — 10907ZC DRAINAGE OF AMNIOTIC FLUID, THERAPEUTIC FROM PRODUCTS OF CONCEPTION, VIA NATURAL OR ARTIFICIAL OPENING: ICD-10-PCS | Performed by: OBSTETRICS & GYNECOLOGY

## 2025-05-23 PROCEDURE — 3E033VJ INTRODUCTION OF OTHER HORMONE INTO PERIPHERAL VEIN, PERCUTANEOUS APPROACH: ICD-10-PCS | Performed by: OBSTETRICS & GYNECOLOGY

## 2025-05-23 PROCEDURE — 3E0P7VZ INTRODUCTION OF HORMONE INTO FEMALE REPRODUCTIVE, VIA NATURAL OR ARTIFICIAL OPENING: ICD-10-PCS | Performed by: OBSTETRICS & GYNECOLOGY

## 2025-05-23 RX ORDER — POLYETHYLENE GLYCOL 3350 17 G/17G
17 POWDER, FOR SOLUTION ORAL DAILY PRN
Status: DISCONTINUED | OUTPATIENT
Start: 2025-05-24 | End: 2025-05-25 | Stop reason: HOSPADM

## 2025-05-23 RX ORDER — ROPIVACAINE HYDROCHLORIDE 2 MG/ML
INJECTION, SOLUTION EPIDURAL; INFILTRATION; PERINEURAL CONTINUOUS PRN
Status: DISCONTINUED | OUTPATIENT
Start: 2025-05-23 | End: 2025-05-23 | Stop reason: HOSPADM

## 2025-05-23 RX ORDER — OXYTOCIN/0.9 % SODIUM CHLORIDE 30/500 ML
1-30 PLASTIC BAG, INJECTION (ML) INTRAVENOUS
Status: DISCONTINUED | OUTPATIENT
Start: 2025-05-23 | End: 2025-05-23

## 2025-05-23 RX ORDER — IBUPROFEN 600 MG/1
600 TABLET, FILM COATED ORAL EVERY 6 HOURS
Status: DISCONTINUED | OUTPATIENT
Start: 2025-05-23 | End: 2025-05-25 | Stop reason: HOSPADM

## 2025-05-23 RX ORDER — LIDOCAINE HYDROCHLORIDE AND EPINEPHRINE 15; 5 MG/ML; UG/ML
INJECTION, SOLUTION EPIDURAL
Status: COMPLETED | OUTPATIENT
Start: 2025-05-23 | End: 2025-05-23

## 2025-05-23 RX ORDER — SIMETHICONE 80 MG
80 TABLET,CHEWABLE ORAL 4 TIMES DAILY PRN
Status: DISCONTINUED | OUTPATIENT
Start: 2025-05-23 | End: 2025-05-25 | Stop reason: HOSPADM

## 2025-05-23 RX ORDER — FENTANYL CITRATE 50 UG/ML
INJECTION, SOLUTION INTRAMUSCULAR; INTRAVENOUS
Status: COMPLETED
Start: 2025-05-23 | End: 2025-05-23

## 2025-05-23 RX ORDER — BENZOCAINE/MENTHOL 6 MG-10 MG
1 LOZENGE MUCOUS MEMBRANE DAILY PRN
Status: DISCONTINUED | OUTPATIENT
Start: 2025-05-23 | End: 2025-05-25 | Stop reason: HOSPADM

## 2025-05-23 RX ORDER — FENTANYL CITRATE 50 UG/ML
INJECTION, SOLUTION INTRAMUSCULAR; INTRAVENOUS AS NEEDED
Status: DISCONTINUED | OUTPATIENT
Start: 2025-05-23 | End: 2025-05-23 | Stop reason: HOSPADM

## 2025-05-23 RX ORDER — ROPIVACAINE HYDROCHLORIDE 5 MG/ML
INJECTION, SOLUTION EPIDURAL; INFILTRATION; PERINEURAL AS NEEDED
Status: DISCONTINUED | OUTPATIENT
Start: 2025-05-23 | End: 2025-05-23 | Stop reason: HOSPADM

## 2025-05-23 RX ORDER — ONDANSETRON 2 MG/ML
4 INJECTION INTRAMUSCULAR; INTRAVENOUS EVERY 8 HOURS PRN
Status: DISCONTINUED | OUTPATIENT
Start: 2025-05-23 | End: 2025-05-25 | Stop reason: HOSPADM

## 2025-05-23 RX ORDER — ACETAMINOPHEN 325 MG/1
650 TABLET ORAL EVERY 6 HOURS
Status: DISCONTINUED | OUTPATIENT
Start: 2025-05-23 | End: 2025-05-25 | Stop reason: HOSPADM

## 2025-05-23 RX ORDER — CALCIUM CARBONATE 500 MG/1
1000 TABLET, CHEWABLE ORAL DAILY PRN
Status: DISCONTINUED | OUTPATIENT
Start: 2025-05-23 | End: 2025-05-25 | Stop reason: HOSPADM

## 2025-05-23 RX ORDER — OXYTOCIN/0.9 % SODIUM CHLORIDE 30/500 ML
250 PLASTIC BAG, INJECTION (ML) INTRAVENOUS ONCE
Status: DISCONTINUED | OUTPATIENT
Start: 2025-05-23 | End: 2025-05-25 | Stop reason: HOSPADM

## 2025-05-23 RX ORDER — DIPHENHYDRAMINE HCL 25 MG
25 TABLET ORAL EVERY 6 HOURS PRN
Status: DISCONTINUED | OUTPATIENT
Start: 2025-05-23 | End: 2025-05-25 | Stop reason: HOSPADM

## 2025-05-23 RX ADMIN — ROPIVACAINE HYDROCHLORIDE 8 ML: 5 INJECTION EPIDURAL; INFILTRATION; PERINEURAL at 18:33

## 2025-05-23 RX ADMIN — SODIUM CHLORIDE 2.5 MILLION UNITS: 9 INJECTION, SOLUTION INTRAVENOUS at 12:11

## 2025-05-23 RX ADMIN — CALCIUM CARBONATE (ANTACID) CHEW TAB 500 MG 1000 MG: 500 CHEW TAB at 22:06

## 2025-05-23 RX ADMIN — ROPIVACAINE HYDROCHLORIDE 8 ML/HR: 2 INJECTION, SOLUTION EPIDURAL; INFILTRATION at 15:11

## 2025-05-23 RX ADMIN — ONDANSETRON 4 MG: 2 INJECTION INTRAMUSCULAR; INTRAVENOUS at 17:53

## 2025-05-23 RX ADMIN — SODIUM CHLORIDE 999 ML/HR: 0.9 INJECTION, SOLUTION INTRAVENOUS at 14:45

## 2025-05-23 RX ADMIN — ROPIVACAINE HYDROCHLORIDE 5 ML: 2 INJECTION, SOLUTION EPIDURAL; INFILTRATION at 15:06

## 2025-05-23 RX ADMIN — BENZOCAINE AND LEVOMENTHOL 1 APPLICATION: 200; 5 SPRAY TOPICAL at 23:38

## 2025-05-23 RX ADMIN — SODIUM CHLORIDE 500 ML/HR: 0.9 INJECTION, SOLUTION INTRAVENOUS at 20:01

## 2025-05-23 RX ADMIN — SODIUM CHLORIDE 2.5 MILLION UNITS: 9 INJECTION, SOLUTION INTRAVENOUS at 00:16

## 2025-05-23 RX ADMIN — SODIUM CHLORIDE 125 ML/HR: 0.9 INJECTION, SOLUTION INTRAVENOUS at 00:18

## 2025-05-23 RX ADMIN — SODIUM CHLORIDE 2.5 MILLION UNITS: 9 INJECTION, SOLUTION INTRAVENOUS at 08:22

## 2025-05-23 RX ADMIN — IBUPROFEN 600 MG: 600 TABLET ORAL at 23:37

## 2025-05-23 RX ADMIN — SODIUM CHLORIDE 2.5 MILLION UNITS: 9 INJECTION, SOLUTION INTRAVENOUS at 16:37

## 2025-05-23 RX ADMIN — FENTANYL CITRATE 100 MCG: 50 INJECTION INTRAMUSCULAR; INTRAVENOUS at 18:33

## 2025-05-23 RX ADMIN — ROPIVACAINE HYDROCHLORIDE 5 ML: 2 INJECTION, SOLUTION EPIDURAL; INFILTRATION at 15:03

## 2025-05-23 RX ADMIN — SODIUM CHLORIDE 125 ML/HR: 0.9 INJECTION, SOLUTION INTRAVENOUS at 19:05

## 2025-05-23 RX ADMIN — SODIUM CHLORIDE 2.5 MILLION UNITS: 9 INJECTION, SOLUTION INTRAVENOUS at 20:27

## 2025-05-23 RX ADMIN — SODIUM CHLORIDE 2.5 MILLION UNITS: 9 INJECTION, SOLUTION INTRAVENOUS at 04:06

## 2025-05-23 RX ADMIN — Medication 2 MILLI-UNITS/MIN: at 11:44

## 2025-05-23 RX ADMIN — LIDOCAINE HYDROCHLORIDE AND EPINEPHRINE 3 ML: 15; 5 INJECTION, SOLUTION EPIDURAL; INFILTRATION; INTRACAUDAL; PERINEURAL at 15:00

## 2025-05-23 RX ADMIN — ROPIVACAINE HYDROCHLORIDE: 2 INJECTION, SOLUTION EPIDURAL; INFILTRATION at 15:15

## 2025-05-23 RX ADMIN — SODIUM CHLORIDE 125 ML/HR: 0.9 INJECTION, SOLUTION INTRAVENOUS at 11:24

## 2025-05-23 RX ADMIN — Medication 50 MCG: at 01:29

## 2025-05-23 RX ADMIN — SODIUM CHLORIDE 125 ML/HR: 0.9 INJECTION, SOLUTION INTRAVENOUS at 16:06

## 2025-05-23 NOTE — OB LABOR/OXYTOCIN SAFETY PROGRESS
Oxytocin Safety Progress Check Note - Ciera Maria 35 y.o. female MRN: [id number]    Unit/Bed#: -01 Encounter: [id number]    Dose (gerardo-units/min) Oxytocin: 6 gerardo-units/min  Contraction Frequency (minutes): 1-6  Contraction Intensity: Mild  Uterine Activity Characteristics: Irritability  Cervical Dilation: 5        Cervical Effacement: 70  Fetal Station: -3  Baseline Rate (FHR): 130 bpm  Fetal Heart Rate (FHT): 144 BPM  FHR Category: 1               Vital Signs:   Vitals:    05/23/25 1239   BP: 127/73   Pulse: 75   Resp:    Temp:    SpO2:        Notes/comments:   SVE as above. AROM with no fluid return due to no fluid in front of baby's head. Patient instructed that if no fluid returns once sat up then will re-attempt AROM. D/w Dr. Keisha Montes MD 5/23/2025 1:07 PM

## 2025-05-23 NOTE — OB LABOR/OXYTOCIN SAFETY PROGRESS
Labor Progress Note - Ciera Maria 35 y.o. female MRN: [id number]    Unit/Bed#: -01 Encounter: [id number]       Contraction Frequency (minutes): 1-2  Contraction Intensity: Mild/Moderate  Uterine Activity Characteristics: Irregular  Cervical Dilation: 1        Cervical Effacement: 50  Fetal Station: -3  Baseline Rate (FHR): 125 bpm  Fetal Heart Rate (FHT): 135 BPM  FHR Category: 1               Vital Signs:   Vitals:    05/22/25 1903   BP: 116/64   Pulse: 73   Resp: 16   Temp: 98.1 °F (36.7 °C)   SpO2: 98%       Patient due for check. Reports having had more/stronger contractions after the PO cytotec vs the initial vaginal cytotec. SVE with some change, as above. Vertex presentation confirmed again with bedside TAUS. Additional PO cytotec ordered.     Rona Lim MD 5/23/2025 12:57 AM

## 2025-05-23 NOTE — ANESTHESIA PREPROCEDURE EVALUATION
Procedure:  LABOR ANALGESIA    Relevant Problems   GYN   (+) 37 weeks gestation of pregnancy      PULMONARY   (+) Asthma   (+) Mild persistent asthma without complication      Obstetrics/Gynecology   (+) Obesity in pregnancy        Physical Exam    Airway     Mallampati score: II  TM Distance: >3 FB  Neck ROM: full  Upper bite lip test: II  Mouth opening: >= 4 cm      Cardiovascular  Cardiovascular exam normal    Dental   No notable dental hx     Pulmonary  Pulmonary exam normal     Neurological    She appears awake, alert and oriented x3.      Other Findings  post-pubertal.      Anesthesia Plan  ASA Score- 3     Anesthesia Type- epidural with ASA Monitors.         Additional Monitors:     Airway Plan:            Plan Factors-    Chart reviewed.   Existing labs reviewed. Patient summary reviewed.                  Induction-     Postoperative Plan- .   Monitoring Plan - Monitoring plan - standard ASA monitoring  Post Operative Pain Plan - epidural catheter    Perioperative Resuscitation Plan - Level 1 - Full Code.       Informed Consent- Anesthetic plan and risks discussed with patient.        NPO Status:  No vitals data found for the desired time range.

## 2025-05-23 NOTE — OB LABOR/OXYTOCIN SAFETY PROGRESS
Oxytocin Safety Progress Check Note - Ciera Maria 35 y.o. female MRN: [id number]    Unit/Bed#: -01 Encounter: [id number]    Dose (gerardo-units/min) Oxytocin: 14 gerardo-units/min  Contraction Frequency (minutes): 2 - 3  Contraction Intensity: Mild/Moderate  Uterine Activity Characteristics: Irritability  Cervical Dilation: 5        Cervical Effacement: 70  Fetal Station: -2  Baseline Rate (FHR): 135 bpm  Fetal Heart Rate (FHT): 144 BPM  FHR Category: 1               Vital Signs:   Vitals:    05/23/25 1625   BP: 106/67   Pulse:    Resp:    Temp:    SpO2:        Notes/comments:   SVE as above. Category I tracing. Continue pitocin titration. D/w Dr Keisha Montes MD 5/23/2025 4:33 PM

## 2025-05-23 NOTE — OB LABOR/OXYTOCIN SAFETY PROGRESS
Labor Progress Note - Ciera Maria 35 y.o. female MRN: [id number]    Unit/Bed#: -01 Encounter: [id number]       Contraction Frequency (minutes): 1-6  Contraction Intensity: Mild  Uterine Activity Characteristics: Irritability  Cervical Dilation: 1-2        Cervical Effacement: 60  Fetal Station: -3  Baseline Rate (FHR): 130 bpm  Fetal Heart Rate (FHT): 130 BPM  FHR Category: 1               Vital Signs:   Vitals:    05/23/25 0735   BP:    Pulse:    Resp:    Temp: 98.1 °F (36.7 °C)   SpO2:        Notes/comments:   SVE as above. Kaur balloon placed over sterile gloved hands and inflated with 60cc NS, patient tolerated well. Cephalic fetal positioning confirmed prior to placing and re-confirmed still cephalic after placement. Category I tracing. Difficulty picking up contractions to determine if additional dose of cytotec is safe, will switch to wireless monitor to assess if better able to trace. D/w Dr. Keisha Montes MD 5/23/2025 9:52 AM

## 2025-05-23 NOTE — OB LABOR/OXYTOCIN SAFETY PROGRESS
Oxytocin Safety Progress Check Note - Ciera Maria 35 y.o. female MRN: [id number]    Unit/Bed#: -01 Encounter: [id number]    Dose (gerardo-units/min) Oxytocin: 16 gerardo-units/min  Contraction Frequency (minutes): 2  Contraction Intensity: Strong  Uterine Activity Characteristics: Coupling  Cervical Dilation: 5        Cervical Effacement: 90  Fetal Station: -1  Baseline Rate (FHR): 125 bpm  Fetal Heart Rate (FHT): 130 BPM  FHR Category: 2               Vital Signs:   Vitals:    05/23/25 1811   BP: 130/73   Pulse:    Resp:    Temp:    SpO2:        Notes/comments:   SVE as above. Intermittent lates noted, receiving bolus. Kaur catheter placed. Patient uncomfortable, will contact anesthesia for bolus.       Dennise Montes MD 5/23/2025 6:15 PM

## 2025-05-23 NOTE — OB LABOR/OXYTOCIN SAFETY PROGRESS
Oxytocin Safety Progress Check Note - Ciera Maria 35 y.o. female MRN: [id number]    Unit/Bed#: -01 Encounter: [id number]    Dose (gerardo-units/min) Oxytocin: 0 gerardo-units/min (per Dr. Valdes verbal order.)  Contraction Frequency (minutes): 2-4  Contraction Intensity: Strong  Uterine Activity Characteristics: Coupling  Cervical Dilation: 5        Cervical Effacement: 90  Fetal Station: -1  Baseline Rate (FHR): 125 bpm  Fetal Heart Rate (FHT): 110 BPM  FHR Category: 2               Vital Signs:   Vitals:    05/23/25 1900   BP: 124/93   Pulse:    Resp:    Temp:    SpO2:        Notes/comments:   Pt seen and examined secondary to bradycardia with late decelerations noted. FSE and IUPC placed. Maternal position changes performed and pitocin discontinued.  REturn to baseline noted. Moderate variability throughout. Early decelerations noted  Continue to monitor closely    Elayne Valdes MD 5/23/2025 7:06 PM

## 2025-05-23 NOTE — OB LABOR/OXYTOCIN SAFETY PROGRESS
Labor Progress Note - Ciera Maria 35 y.o. female MRN: [id number]    Unit/Bed#: -01 Encounter: [id number]       Contraction Frequency (minutes): untraceable (pt reports contractions every 3-5 minutes increasing in intensity)  Contraction Intensity: Mild/Moderate  Uterine Activity Characteristics: Irregular  Cervical Dilation: 1        Cervical Effacement: 50  Fetal Station: -3  Baseline Rate (FHR): 125 bpm  Fetal Heart Rate (FHT): 125 BPM  FHR Category: 1               Vital Signs:   Vitals:    05/23/25 0403   BP: 110/76   Pulse: 68   Resp: 16   Temp: 98.2 °F (36.8 °C)   SpO2: 98%       Patient due for another check s/p last dose of cytotec. SVE unchanged, as above. FHT cat 1.     Rona Lim MD 5/23/2025 5:46 AM

## 2025-05-23 NOTE — PLAN OF CARE
Problem: PAIN - ADULT  Goal: Verbalizes/displays adequate comfort level or baseline comfort level  Description: Interventions:  - Encourage patient to monitor pain and request assistance  - Assess pain using appropriate pain scale  - Administer analgesics as ordered based on type and severity of pain and evaluate response  - Implement non-pharmacological measures as appropriate and evaluate response  - Consider cultural and social influences on pain and pain management  - Notify physician/advanced practitioner if interventions unsuccessful or patient reports new pain  - Educate patient/family on pain management process including their role and importance of  reporting pain   - Provide non-pharmacologic/complimentary pain relief interventions  Outcome: Progressing     Problem: INFECTION - ADULT  Goal: Absence or prevention of progression during hospitalization  Description: INTERVENTIONS:  - Assess and monitor for signs and symptoms of infection  - Monitor lab/diagnostic results  - Monitor all insertion sites, i.e. indwelling lines, tubes, and drains  - Monitor endotracheal if appropriate and nasal secretions for changes in amount and color  - [place] appropriate cooling/warming therapies per order  - Administer medications as ordered  - Instruct and encourage patient and family to use good hand hygiene technique  - Identify and instruct in appropriate isolation precautions for identified infection/condition  Outcome: Progressing  Goal: Absence of fever/infection during neutropenic period  Description: INTERVENTIONS:  - Monitor WBC  - Perform strict hand hygiene  - Limit to healthy visitors only  - No plants, dried, fresh or silk flowers with arita in patient room  Outcome: Progressing     Problem: SAFETY ADULT  Goal: Patient will remain free of falls  Description: INTERVENTIONS:  - Educate patient/family on patient safety including physical limitations  - Instruct patient to call for assistance with activity   -  Consider consulting OT/PT to assist with strengthening/mobility based on AM PAC & JH-HLM score  - Consult OT/PT to assist with strengthening/mobility   - Keep Call bell within reach  - Keep bed low and locked with side rails adjusted as appropriate  - Keep care items and personal belongings within reach  - Initiate and maintain comfort rounds  - Make Fall Risk Sign visible to staff    - Apply yellow socks and bracelet for high fall risk patients  - Consider moving patient to room near nurses station  Outcome: Progressing  Goal: Maintain or return to baseline ADL function  Description: INTERVENTIONS:  -  Assess patient's ability to carry out ADLs; assess patient's baseline for ADL function and identify physical deficits which impact ability to perform ADLs (bathing, care of mouth/teeth, toileting, grooming, dressing, etc.)  - Assess/evaluate cause of self-care deficits   - Assess range of motion  - Assess patient's mobility; develop plan if impaired  - Assess patient's need for assistive devices and provide as appropriate  - Encourage maximum independence but intervene and supervise when necessary  - Involve family in performance of ADLs  - Assess for home care needs following discharge   - Consider OT consult to assist with ADL evaluation and planning for discharge  - Provide patient education as appropriate  - Monitor functional capacity and physical performance, use of AM PAC & JH-HLM   - Monitor gait, balance and fatigue with ambulation    Outcome: Progressing  Goal: Maintains/Returns to pre admission functional level  Description: INTERVENTIONS:  - Perform AM-PAC 6 Click Basic Mobility/ Daily Activity assessment daily.  - Set and communicate daily mobility goal to care team and patient/family/caregiver.   - Collaborate with rehabilitation services on mobility goals if consulted  - Out of bed for toileting  - Record patient progress and toleration of activity level   Outcome: Progressing     Problem: DISCHARGE  PLANNING  Goal: Discharge to home or other facility with appropriate resources  Description: INTERVENTIONS:  - Identify barriers to discharge w/patient and caregiver  - Arrange for needed discharge resources and transportation as appropriate  - Identify discharge learning needs (meds, wound care, etc.)  - Arrange for interpretive services to assist at discharge as needed  - Refer to Case Management Department for coordinating discharge planning if the patient needs post-hospital services based on physician/advanced practitioner order or complex needs related to functional status, cognitive ability, or social support system  Outcome: Progressing     Problem: Knowledge Deficit  Goal: Patient/family/caregiver demonstrates understanding of disease process, treatment plan, medications, and discharge instructions  Description: Complete learning assessment and assess knowledge base.  Interventions:  - Provide teaching at level of understanding  - Provide teaching via preferred learning methods  Outcome: Progressing

## 2025-05-23 NOTE — PLAN OF CARE
Problem: PAIN - ADULT  Goal: Verbalizes/displays adequate comfort level or baseline comfort level  Description: Interventions:  - Encourage patient to monitor pain and request assistance  - Assess pain using appropriate pain scale  - Administer analgesics as ordered based on type and severity of pain and evaluate response  - Implement non-pharmacological measures as appropriate and evaluate response  - Consider cultural and social influences on pain and pain management  - Notify physician/advanced practitioner if interventions unsuccessful or patient reports new pain  - Educate patient/family on pain management process including their role and importance of  reporting pain   - Provide non-pharmacologic/complimentary pain relief interventions  Outcome: Progressing     Problem: INFECTION - ADULT  Goal: Absence or prevention of progression during hospitalization  Description: INTERVENTIONS:  - Assess and monitor for signs and symptoms of infection  - Monitor lab/diagnostic results  - Monitor all insertion sites, i.e. indwelling lines, tubes, and drains  - Monitor endotracheal if appropriate and nasal secretions for changes in amount and color  - [place] appropriate cooling/warming therapies per order  - Administer medications as ordered  - Instruct and encourage patient and family to use good hand hygiene technique  - Identify and instruct in appropriate isolation precautions for identified infection/condition  Outcome: Progressing  Goal: Absence of fever/infection during neutropenic period  Description: INTERVENTIONS:  - Monitor WBC  - Perform strict hand hygiene  - Limit to healthy visitors only  - No plants, dried, fresh or silk flowers with arita in patient room  Outcome: Progressing     Problem: SAFETY ADULT  Goal: Patient will remain free of falls  Description: INTERVENTIONS:  - Educate patient/family on patient safety including physical limitations  - Instruct patient to call for assistance with activity   -  Consider consulting OT/PT to assist with strengthening/mobility based on AM PAC & JH-HLM score  - Consult OT/PT to assist with strengthening/mobility   - Keep Call bell within reach  - Keep bed low and locked with side rails adjusted as appropriate  - Keep care items and personal belongings within reach  - Initiate and maintain comfort rounds  - Make Fall Risk Sign visible to staff  - Apply yellow socks and bracelet for high fall risk patients  - Consider moving patient to room near nurses station  Outcome: Progressing  Goal: Maintain or return to baseline ADL function  Description: INTERVENTIONS:  -  Assess patient's ability to carry out ADLs; assess patient's baseline for ADL function and identify physical deficits which impact ability to perform ADLs (bathing, care of mouth/teeth, toileting, grooming, dressing, etc.)  - Assess/evaluate cause of self-care deficits   - Assess range of motion  - Assess patient's mobility; develop plan if impaired  - Assess patient's need for assistive devices and provide as appropriate  - Encourage maximum independence but intervene and supervise when necessary  - Involve family in performance of ADLs  - Assess for home care needs following discharge   - Consider OT consult to assist with ADL evaluation and planning for discharge  - Provide patient education as appropriate  - Monitor functional capacity and physical performance, use of AM PAC & JH-HLM   - Monitor gait, balance and fatigue with ambulation    Outcome: Progressing  Goal: Maintains/Returns to pre admission functional level  Description: INTERVENTIONS:  - Perform AM-PAC 6 Click Basic Mobility/ Daily Activity assessment daily.  - Set and communicate daily mobility goal to care team and patient/family/caregiver.   - Collaborate with rehabilitation services on mobility goals if consulted-- Out of bed for toileting  - Record patient progress and toleration of activity level   Outcome: Progressing     Problem: DISCHARGE  PLANNING  Goal: Discharge to home or other facility with appropriate resources  Description: INTERVENTIONS:  - Identify barriers to discharge w/patient and caregiver  - Arrange for needed discharge resources and transportation as appropriate  - Identify discharge learning needs (meds, wound care, etc.)  - Arrange for interpretive services to assist at discharge as needed  - Refer to Case Management Department for coordinating discharge planning if the patient needs post-hospital services based on physician/advanced practitioner order or complex needs related to functional status, cognitive ability, or social support system  Outcome: Progressing     Problem: Knowledge Deficit  Goal: Patient/family/caregiver demonstrates understanding of disease process, treatment plan, medications, and discharge instructions  Description: Complete learning assessment and assess knowledge base.  Interventions:  - Provide teaching at level of understanding  - Provide teaching via preferred learning methods  Outcome: Progressing     Problem: ANTEPARTUM  Goal: Maintain pregnancy as long as maternal and/or fetal condition is stable  Description: INTERVENTIONS:  - Maternal surveillance  - Fetal surveillance  - Monitor uterine activity  - Medications as ordered  - Bedrest  Outcome: Progressing     Problem: BIRTH - VAGINAL/ SECTION  Goal: Fetal and maternal status remain reassuring during the birth process  Description: INTERVENTIONS:  - Monitor vital signs  - Monitor fetal heart rate  - Monitor uterine activity  - Monitor labor progression (vaginal delivery)  - DVT prophylaxis  - Antibiotic prophylaxis  Outcome: Progressing  Goal: Emotionally satisfying birthing experience for mother/fetus  Description: Interventions:  - Assess, plan, implement and evaluate the nursing care given to the patient in labor  - Advocate the philosophy that each childbirth experience is a unique experience and support the family's chosen level of  involvement and control during the labor process   - Actively participate in both the patient's and family's teaching of the birth process  - Consider cultural, Congregation and age-specific factors and plan care for the patient in labor  Outcome: Progressing

## 2025-05-23 NOTE — ANESTHESIA PROCEDURE NOTES
"Epidural Block    Start time: 5/23/2025 3:00 PM  Reason for block: at surgeon's request and primary anesthetic  Staffing  Performed by: Fran Abdalla DO  Authorized by: Fran Abdalla DO    Preanesthetic Checklist  Completed: patient identified, IV checked, risks and benefits discussed, surgical consent, monitors and equipment checked, pre-op evaluation and timeout performed  Epidural  Patient position: sitting  Prep: Betadine  Sedation Level: no sedation  Patient monitoring: frequent blood pressure checks, continuous pulse oximetry and heart rate  Approach: midline  Location: lumbar, L3-4  Injection technique: HAI saline  Needle  Needle type: Tuohy   Needle gauge: 18 G  Needle insertion depth: 8 cm  Catheter type: multi-orifice  Catheter size: 19 G  Catheter at skin depth: 13 cm  Catheter securement method: stabilization device and clear occlusive dressing  Test dose: negativelidocaine-epinephrine (XYLOCAINE-MPF/EPINEPHRINE) 1.5 %-1:200,000 injection 3 mL - Epidural   3 mL - 5/23/2025 3:00:00 PM  Assessment  Sensory level: T10  Number of attempts: 1negative aspiration for CSF, negative aspiration for heme and no paresthesia on injection  patient tolerated the procedure well with no immediate complications  Additional Notes  Dural puncture epidural.  25g 5\" [place] needle.  +CSF.  Needle removed.            "

## 2025-05-23 NOTE — OB LABOR/OXYTOCIN SAFETY PROGRESS
Labor Progress Note - Ciera Maria 35 y.o. female MRN: [id number]    Unit/Bed#: -01 Encounter: [id number]       Contraction Frequency (minutes): 1-6  Contraction Intensity: Mild  Uterine Activity Characteristics: Irritability  Cervical Dilation: 4-5        Cervical Effacement: 70  Fetal Station: -3  Baseline Rate (FHR): 130 bpm  Fetal Heart Rate (FHT): 130 BPM  FHR Category: 1               Vital Signs:   Vitals:    05/23/25 0735   BP:    Pulse:    Resp:    Temp: 98.1 °F (36.7 °C)   SpO2:        Notes/comments:   SVE as above. Kaur balloon dislodged, plan to start pitocin. Baby confirmed cephalic on TAUS.     Dennise Montes MD 5/23/2025 11:16 AM

## 2025-05-24 PROBLEM — O32.0XX0 UNSTABLE LIE OF FETUS: Status: RESOLVED | Noted: 2025-05-22 | Resolved: 2025-05-24

## 2025-05-24 PROBLEM — R03.0 ELEVATED BLOOD PRESSURE READING WITHOUT DIAGNOSIS OF HYPERTENSION: Status: ACTIVE | Noted: 2025-05-24

## 2025-05-24 PROBLEM — O36.63X0 MACROSOMIA OF FETUS AFFECTING MANAGEMENT OF MOTHER IN THIRD TRIMESTER: Status: RESOLVED | Noted: 2025-04-16 | Resolved: 2025-05-24

## 2025-05-24 PROBLEM — O13.9 GESTATIONAL HTN: Status: ACTIVE | Noted: 2025-05-24

## 2025-05-24 PROCEDURE — 99024 POSTOP FOLLOW-UP VISIT: CPT | Performed by: OBSTETRICS & GYNECOLOGY

## 2025-05-24 PROCEDURE — 3E0R3BZ INTRODUCTION OF ANESTHETIC AGENT INTO SPINAL CANAL, PERCUTANEOUS APPROACH: ICD-10-PCS | Performed by: ANESTHESIOLOGY

## 2025-05-24 RX ADMIN — IBUPROFEN 600 MG: 600 TABLET ORAL at 05:38

## 2025-05-24 RX ADMIN — ACETAMINOPHEN 650 MG: 325 TABLET, FILM COATED ORAL at 03:01

## 2025-05-24 RX ADMIN — IBUPROFEN 600 MG: 600 TABLET ORAL at 11:00

## 2025-05-24 RX ADMIN — IBUPROFEN 600 MG: 600 TABLET ORAL at 23:37

## 2025-05-24 RX ADMIN — ACETAMINOPHEN 650 MG: 325 TABLET, FILM COATED ORAL at 14:54

## 2025-05-24 RX ADMIN — IBUPROFEN 600 MG: 600 TABLET ORAL at 17:03

## 2025-05-24 RX ADMIN — ACETAMINOPHEN 650 MG: 325 TABLET, FILM COATED ORAL at 20:43

## 2025-05-24 RX ADMIN — ACETAMINOPHEN 650 MG: 325 TABLET, FILM COATED ORAL at 09:02

## 2025-05-24 NOTE — ASSESSMENT & PLAN NOTE
Lab Results   Component Value Date    HGBA1C 6.3 (H) 03/31/2025     Recent Labs     05/23/25  1429 05/23/25  1630 05/23/25  1844 05/23/25 2030   POCGLU 62* 77 64* 67       Blood Sugar Average: Last 72 hrs:  (P) [id number] - 2 hours postprandial    2 hr gtt postpartum

## 2025-05-24 NOTE — PLAN OF CARE
Problem: ANTEPARTUM  Goal: Maintain pregnancy as long as maternal and/or fetal condition is stable  Description: INTERVENTIONS:  - Maternal surveillance  - Fetal surveillance  - Monitor uterine activity  - Medications as ordered  - Bedrest  2025 by Gilma Dozier  Outcome: Completed  2025 by Gilma Dozier  Outcome: Progressing     Problem: BIRTH - VAGINAL/ SECTION  Goal: Fetal and maternal status remain reassuring during the birth process  Description: INTERVENTIONS:  - Monitor vital signs  - Monitor fetal heart rate  - Monitor uterine activity  - Monitor labor progression (vaginal delivery)  - DVT prophylaxis  - Antibiotic prophylaxis  2025 by Gilma Dozier  Outcome: Completed  2025 by Gilma Dozier  Outcome: Progressing  Goal: Emotionally satisfying birthing experience for mother/fetus  Description: Interventions:  - Assess, plan, implement and evaluate the nursing care given to the patient in labor  - Advocate the philosophy that each childbirth experience is a unique experience and support the family's chosen level of involvement and control during the labor process   - Actively participate in both the patient's and family's teaching of the birth process  - Consider cultural, [employer] and age-specific factors and plan care for the patient in labor  2025 by Gilma Dozier  Outcome: Completed  2025 by Gilma Dozier  Outcome: Progressing

## 2025-05-24 NOTE — OB LABOR/OXYTOCIN SAFETY PROGRESS
Oxytocin Safety Progress Check Note - Ciera Maria 35 y.o. female MRN: [id number]    Unit/Bed#: -01 Encounter: [id number]    Dose (gerardo-units/min) Oxytocin: 6 gerardo-units/min  Contraction Frequency (minutes): 3-4  Contraction Intensity: Moderate/Strong  Uterine Activity Characteristics: Irregular  Cervical Dilation: 10  Dilation Complete Date: 05/23/25  Dilation Complete Time: 2044  Cervical Effacement: 100  Fetal Station: 2  Baseline Rate (FHR): 115 bpm  Fetal Heart Rate (FHT): 110 BPM  FHR Category: 2               Vital Signs:   Vitals:    05/23/25 2036   BP: (!) 186/144   Pulse: (!) 108   Resp:    Temp:    SpO2:        Notes/comments:   Patient complete, occasional variable. Confirmed cephalic on ultrasound.      Dennise Montes MD 5/23/2025 8:45 PM

## 2025-05-24 NOTE — ANESTHESIA POSTPROCEDURE EVALUATION
Post-Op Assessment Note    CV Status:  Stable    Pain management: adequate      Post-op block assessment: catheter intact and no complications   Mental Status:  Alert and awake   Hydration Status:  Euvolemic   PONV Controlled:  Controlled   Airway Patency:  Patent     Post Op Vitals Reviewed: Yes    No anethesia notable event occurred.    Staff: Anesthesiologist           Last Filed PACU Vitals:  Vitals Value Taken Time   Temp     Pulse     BP     Resp     SpO2

## 2025-05-24 NOTE — PLAN OF CARE
Problem: PAIN - ADULT  Goal: Verbalizes/displays adequate comfort level or baseline comfort level  Description: Interventions:  - Encourage patient to monitor pain and request assistance  - Assess pain using appropriate pain scale  - Administer analgesics as ordered based on type and severity of pain and evaluate response  - Implement non-pharmacological measures as appropriate and evaluate response  - Consider cultural and social influences on pain and pain management  - Notify physician/advanced practitioner if interventions unsuccessful or patient reports new pain  - Educate patient/family on pain management process including their role and importance of  reporting pain   - Provide non-pharmacologic/complimentary pain relief interventions  Outcome: Progressing     Problem: INFECTION - ADULT  Goal: Absence or prevention of progression during hospitalization  Description: INTERVENTIONS:  - Assess and monitor for signs and symptoms of infection  - Monitor lab/diagnostic results  - Monitor all insertion sites, i.e. indwelling lines, tubes, and drains  - Monitor endotracheal if appropriate and nasal secretions for changes in amount and color  - [place] appropriate cooling/warming therapies per order  - Administer medications as ordered  - Instruct and encourage patient and family to use good hand hygiene technique  - Identify and instruct in appropriate isolation precautions for identified infection/condition  Outcome: Progressing  Goal: Absence of fever/infection during neutropenic period  Description: INTERVENTIONS:  - Monitor WBC  - Perform strict hand hygiene  - Limit to healthy visitors only  - No plants, dried, fresh or silk flowers with arita in patient room  Outcome: Progressing     Problem: SAFETY ADULT  Goal: Patient will remain free of falls  Description: INTERVENTIONS:  - Educate patient/family on patient safety including physical limitations  - Instruct patient to call for assistance with activity   -  Consider consulting OT/PT to assist with strengthening/mobility based on AM PAC & JH-HLM score  - Consult OT/PT to assist with strengthening/mobility   - Keep Call bell within reach  - Keep bed low and locked with side rails adjusted as appropriate  - Keep care items and personal belongings within reach  - Initiate and maintain comfort rounds  - Make Fall Risk Sign visible to staff    - Apply yellow socks and bracelet for high fall risk patients  - Consider moving patient to room near nurses station  Outcome: Progressing  Goal: Maintain or return to baseline ADL function  Description: INTERVENTIONS:  -  Assess patient's ability to carry out ADLs; assess patient's baseline for ADL function and identify physical deficits which impact ability to perform ADLs (bathing, care of mouth/teeth, toileting, grooming, dressing, etc.)  - Assess/evaluate cause of self-care deficits   - Assess range of motion  - Assess patient's mobility; develop plan if impaired  - Assess patient's need for assistive devices and provide as appropriate  - Encourage maximum independence but intervene and supervise when necessary  - Involve family in performance of ADLs  - Assess for home care needs following discharge   - Consider OT consult to assist with ADL evaluation and planning for discharge  - Provide patient education as appropriate  - Monitor functional capacity and physical performance, use of AM PAC & JH-HLM   - Monitor gait, balance and fatigue with ambulation    Outcome: Progressing  Goal: Maintains/Returns to pre admission functional level  Description: INTERVENTIONS:  - Perform AM-PAC 6 Click Basic Mobility/ Daily Activity assessment daily.  - Set and communicate daily mobility goal to care team and patient/family/caregiver.   - Collaborate with rehabilitation services on mobility goals if consulted  -- Out of bed for toileting  - Record patient progress and toleration of activity level   Outcome: Progressing     Problem: DISCHARGE  PLANNING  Goal: Discharge to home or other facility with appropriate resources  Description: INTERVENTIONS:  - Identify barriers to discharge w/patient and caregiver  - Arrange for needed discharge resources and transportation as appropriate  - Identify discharge learning needs (meds, wound care, etc.)  - Arrange for interpretive services to assist at discharge as needed  - Refer to Case Management Department for coordinating discharge planning if the patient needs post-hospital services based on physician/advanced practitioner order or complex needs related to functional status, cognitive ability, or social support system  Outcome: Progressing     Problem: Knowledge Deficit  Goal: Patient/family/caregiver demonstrates understanding of disease process, treatment plan, medications, and discharge instructions  Description: Complete learning assessment and assess knowledge base.  Interventions:  - Provide teaching at level of understanding  - Provide teaching via preferred learning methods  Outcome: Progressing

## 2025-05-24 NOTE — ASSESSMENT & PLAN NOTE
Lab Results   Component Value Date    HGBA1C 6.3 (H) 03/31/2025     Recent Labs     05/23/25  1429 05/23/25  1630 05/23/25  1844 05/23/25 2030   POCGLU 62* 77 64* 67     Blood Sugar Average: Last 72 hrs:  (P) [id number] - 2 hours postprandial    GDM protocol ordered  Patient on 10-4-10u Novolog with B/L/D, 80u Lantus qHS  Poorly controlled outpatient and reason for recommended delivery at 37 weeks

## 2025-05-24 NOTE — ASSESSMENT & PLAN NOTE
Lab Results   Component Value Date    HGBA1C 6.3 (H) 03/31/2025     Recent Labs     05/23/25  1429 05/23/25  1630 05/23/25  1844 05/23/25 2030   POCGLU 62* 77 64* 67     Blood Sugar Average: Last 72 hrs:  (P) [id number] - 2 hours postprandial    2hr gtt postpartum

## 2025-05-24 NOTE — DISCHARGE INSTR - AVS FIRST PAGE
"Discharge instructions:   -Do not place anything (no partner, sex toys, tampons, douche, etc.) in your vagina for 6 weeks  -You may walk for exercise for the first 6 weeks then gradually return to your usual activities    -Please do not drive if you are taking any narcotic medications  -You may take baths or shower per your preference   -Please examine your breasts in the mirror daily and call your doctor for redness, tenderness, increased warmth, fevers, or chills  -Please call your doctor's office for temperature > 100.4*F or 38*C, worsening pain, increased bleeding (filling 2 or more maxi pads within 1 hr or less for at least 2 hrs), foul-smelling discharge/drainage, burning with urination, or symptoms of depression    For pain, you may take 650mg of Tylenol every 6 hours  For cramping, you may take 600mg of ibuprofen every 6 hours    You can alternate Tylenol and ibuprofen and take them \"around the clock\" to stay ahead of pain. For example, take 650mg of Tylenol at 9 AM, then 600mg of ibuprofen at 12 PM, then 650mg of Tylenol at 3 PM, and so forth.     Please take over the counter stool softener or laxative to ensure you do not strain when moving your bowels. You can take whatever is preferred (Miralax, Senna, Metamucil).    If you have any questions or concerns, please call your doctor.    "

## 2025-05-24 NOTE — PLAN OF CARE
Problem: PAIN - ADULT  Goal: Verbalizes/displays adequate comfort level or baseline comfort level  Description: Interventions:  - Encourage patient to monitor pain and request assistance  - Assess pain using appropriate pain scale  - Administer analgesics as ordered based on type and severity of pain and evaluate response  - Implement non-pharmacological measures as appropriate and evaluate response  - Consider cultural and social influences on pain and pain management  - Notify physician/advanced practitioner if interventions unsuccessful or patient reports new pain  - Educate patient/family on pain management process including their role and importance of  reporting pain   - Provide non-pharmacologic/complimentary pain relief interventions  Outcome: Progressing     Problem: INFECTION - ADULT  Goal: Absence or prevention of progression during hospitalization  Description: INTERVENTIONS:  - Assess and monitor for signs and symptoms of infection  - Monitor lab/diagnostic results  - Monitor all insertion sites, i.e. indwelling lines, tubes, and drains  - Monitor endotracheal if appropriate and nasal secretions for changes in amount and color  - Mason appropriate cooling/warming therapies per order  - Administer medications as ordered  - Instruct and encourage patient and family to use good hand hygiene technique  - Identify and instruct in appropriate isolation precautions for identified infection/condition  Outcome: Progressing  Goal: Absence of fever/infection during neutropenic period  Description: INTERVENTIONS:  - Monitor WBC  - Perform strict hand hygiene  - Limit to healthy visitors only  - No plants, dried, fresh or silk flowers with arita in patient room  Outcome: Progressing     Problem: SAFETY ADULT  Goal: Patient will remain free of falls  Description: INTERVENTIONS:  - Educate patient/family on patient safety including physical limitations  - Instruct patient to call for assistance with activity   -  Consider consulting OT/PT to assist with strengthening/mobility based on AM PAC & JH-HLM score  - Consult OT/PT to assist with strengthening/mobility   - Keep Call bell within reach  - Keep bed low and locked with side rails adjusted as appropriate  - Keep care items and personal belongings within reach  - Initiate and maintain comfort rounds  - Make Fall Risk Sign visible to staff  - Offer Toileting every  Hours, in advance of need  - Initiate/Maintain alarm  - Obtain necessary fall risk management equipment:   - Apply yellow socks and bracelet for high fall risk patients  - Consider moving patient to room near nurses station  Outcome: Progressing  Goal: Maintain or return to baseline ADL function  Description: INTERVENTIONS:  -  Assess patient's ability to carry out ADLs; assess patient's baseline for ADL function and identify physical deficits which impact ability to perform ADLs (bathing, care of mouth/teeth, toileting, grooming, dressing, etc.)  - Assess/evaluate cause of self-care deficits   - Assess range of motion  - Assess patient's mobility; develop plan if impaired  - Assess patient's need for assistive devices and provide as appropriate  - Encourage maximum independence but intervene and supervise when necessary  - Involve family in performance of ADLs  - Assess for home care needs following discharge   - Consider OT consult to assist with ADL evaluation and planning for discharge  - Provide patient education as appropriate  - Monitor functional capacity and physical performance, use of AM PAC & JH-HLM   - Monitor gait, balance and fatigue with ambulation    Outcome: Progressing  Goal: Maintains/Returns to pre admission functional level  Description: INTERVENTIONS:  - Perform AM-PAC 6 Click Basic Mobility/ Daily Activity assessment daily.  - Set and communicate daily mobility goal to care team and patient/family/caregiver.   - Collaborate with rehabilitation services on mobility goals if consulted  -  Perform Range of Motion  times a day.  - Reposition patient every  hours.  - Dangle patient  times a day  - Stand patient  times a day  - Ambulate patient  times a day  - Out of bed to chair  times a day   - Out of bed for meals times a day  - Out of bed for toileting  - Record patient progress and toleration of activity level   Outcome: Progressing     Problem: DISCHARGE PLANNING  Goal: Discharge to home or other facility with appropriate resources  Description: INTERVENTIONS:  - Identify barriers to discharge w/patient and caregiver  - Arrange for needed discharge resources and transportation as appropriate  - Identify discharge learning needs (meds, wound care, etc.)  - Arrange for interpretive services to assist at discharge as needed  - Refer to Case Management Department for coordinating discharge planning if the patient needs post-hospital services based on physician/advanced practitioner order or complex needs related to functional status, cognitive ability, or social support system  Outcome: Progressing     Problem: Knowledge Deficit  Goal: Patient/family/caregiver demonstrates understanding of disease process, treatment plan, medications, and discharge instructions  Description: Complete learning assessment and assess knowledge base.  Interventions:  - Provide teaching at level of understanding  - Provide teaching via preferred learning methods  Outcome: Progressing     Problem: ANTEPARTUM  Goal: Maintain pregnancy as long as maternal and/or fetal condition is stable  Description: INTERVENTIONS:  - Maternal surveillance  - Fetal surveillance  - Monitor uterine activity  - Medications as ordered  - Bedrest  Outcome: Progressing     Problem: BIRTH - VAGINAL/ SECTION  Goal: Fetal and maternal status remain reassuring during the birth process  Description: INTERVENTIONS:  - Monitor vital signs  - Monitor fetal heart rate  - Monitor uterine activity  - Monitor labor progression (vaginal delivery)  - DVT  prophylaxis  - Antibiotic prophylaxis  Outcome: Progressing  Goal: Emotionally satisfying birthing experience for mother/fetus  Description: Interventions:  - Assess, plan, implement and evaluate the nursing care given to the patient in labor  - Advocate the philosophy that each childbirth experience is a unique experience and support the family's chosen level of involvement and control during the labor process   - Actively participate in both the patient's and family's teaching of the birth process  - Consider cultural, Bahai and age-specific factors and plan care for the patient in labor  Outcome: Progressing

## 2025-05-24 NOTE — L&D DELIVERY NOTE
Vaginal Delivery Summary - OB/GYN   Ciera Maria 35 y.o. female MRN: [id number]  Unit/Bed#: -01 Encounter: [id number]    Pre-delivery Diagnosis:   Pregnancy at 37 weeks gestation   A2GDM  Elevated blood pressure without diagnosis of hypertension  Unstable lie  Asthma  Macrosomia  GBS positive    Post-delivery Diagnosis:   Same, delivered    Procedure: Spontaneous Vaginal Delivery, Repair of hymenal ring laceration    Attending Physician: Dr. Valdes  Resident Physician: Dr. Dennise Montes    Anesthesia: Epidural    QBL: 116 cc  Admission H.1 g/dL  Admission platelets: 212 thousands/uL    Complications: none apparent    Specimens:   1. Arterial and venous cord gases  2. Cord blood  3. Segment of umbilical cord  4. Placenta to storage     Findings:  1. Viable male on 2025 at 8:53 PM, with APGARS of 8  and 9  at 1 and 5 minutes respectively. Weight pending at time of dictation for skin to skin bonding.  2. Spontaneous delivery of intact placenta at   3. R sided small laceration at the hymenal ring    Gases:  Umbilical Artery  Recent Labs     25   PHCART 7.314   BECART -5.6*       Umbilical Vein  Recent Labs     25   PHCVEN 7.373   BECVEN -5.1*         Brief history and labor course:  Ciera Maria is now a 35 y.o. [id number] 37w3d who presented to labor and delivery for induction of labor in the setting of uncontrolled type II diabetes. Her pregnancy was notable for unstable lie, macrosomia, and asthma. On presentation, baby was noted to be breech presentation with initial plans for ECV. Prior to ECV, baby was then noted to be cephalic again. SVE was FT/50/-4. She was induced with cytotec x3 doses prior to a dunham balloon being placed. She was then started on pitocin when dislodged. She received an epidural and amniotomy was performed for clear fluid. She progressed to complete cervical dilation and began pushing.    Description of delivery:    After pushing for 4  minutes, Ciera delivered a viable male , wt pending as mother is doing skin to skin bonding. The fetal vertex delivered JYOTI position spontaneously. There was no nuchal cord. The anterior shoulder delivered atraumatically with maternal expulsive forces and the assistance of gentle downward traction. The posterior shoulder delivered with maternal expulsive forces and the assistance of gentle upward traction. The remainder of the fetus delivered spontaneously.     Upon delivery, the infant was placed on the mothers abdomen and the cord was doubly clamped and cut. Delayed cord clamping was achieved. The infant was noted to cry spontaneously and was moving all extremities appropriately. There was no evidence for injury. Nurse resuscitators evaluating the . Arterial and venous cord blood gases and cord blood was collected for analysis. These were promptly sent to the lab. In the immediate post-partum, active management of the 3rd stage of labor was performed with massage, the administration of 30 units of IV pitocin, and gentle traction on the umbilical cord. The placenta delivered spontaneously and was noted to have a centrally inserted 3 vessel cord.  The placenta was sent to storage.    The vagina, cervix, perineum, and rectum were inspected and there was noted to be a very small laceration on the right side at the hymenal ring which was repaired with a figure of 8 stitch using 3-0 vicryl.    The fundus was firm and at the level of the umbilicus.  At the conclusion of the procedure, all needle, sponge, and instrument counts were noted to be correct. Patient tolerated the procedure well and was allowed to recover in labor and delivery room with family and  before being transferred to the post-partum floor. Dr. Valdes was present and participated in all portions of the case.    Disposition:  The patient and the  both tolerated the procedure well and are recovering in labor and delivery room        Dennise Montes MD  OB/GYN PGY-2  5/23/2025  11:19 PM

## 2025-05-24 NOTE — PROGRESS NOTES
"Progress Note - OB/GYN  Ciera Maria 35 y.o. female MRN: [id number]  Unit/Bed#:  412-01 Encounter: [id number]    Assessment and Plan     Ciera Maria is a patient of: [facility]. She is PPD# 1 s/p  spontaneous vaginal delivery  Recovering well and is stable       *  (spontaneous vaginal delivery)  Assessment & Plan  -Continue routine postpartum care  -Pain well controlled with oral analgesics  -Voiding spontaneously  -Tolerating PO fluids and solids  -Encourage breastfeeding and familial bonding     Gestational HTN  Assessment & Plan  Systolic (24hrs), Av , Min:106 , Max:186   Diastolic (24hrs), Av, Min:54, Max:144       Meeting criteria for GHTN on 25 (1445-140/86, 1900-124/93)  CBC/CMP WNL   Monitor BP throughout admission     Insulin controlled gestational diabetes mellitus (GDM) in third trimester  Assessment & Plan  Lab Results   Component Value Date    HGBA1C 6.3 (H) 2025     Recent Labs     25  1429 25  1630 25  1844 25  2030   POCGLU 62* 77 64* 67       Blood Sugar Average: Last 72 hrs:  (P) [id number] - 2 hours postprandial    2 hr gtt postpartum    History of pre-eclampsia in prior pregnancy, currently pregnant in second trimester  Assessment & Plan  See \"gestation HTN\" for plan       Mild persistent asthma without complication  Assessment & Plan  Albuterol PRN ordered         Disposition    - Anticipate discharge home on PPD# 2      Subjective/Objective     Chief Complaint: Postpartum State     Subjective:    Ciera Maria is PPD#1 s/p  spontaneous vaginal delivery. She has no current complaints.  Pain is well controlled.  Patient is currently voiding.  She is ambulating.  Patient is currently passing flatus and has had no bowel movement. She is tolerating PO, and denies nausea or vomitting. Patient denies fever, chills, chest pain, shortness of breath, or calf tenderness. Lochia is normal. She is recovering well and " "is stable.       Vitals:   /72 (BP Location: Right arm)   Pulse 70   Temp 98.1 °F (36.7 °C) (Oral)   Resp 16   Ht 5' 4\" (1.626 m)   Wt 109 kg (241 lb)   LMP 09/11/2024   SpO2 98%   Breastfeeding Yes   BMI 41.37 kg/m²       Intake/Output Summary (Last 24 hours) at 5/24/2025 0721  Last data filed at 5/24/2025 0226  Gross per 24 hour   Intake --   Output 2191 ml   Net -2191 ml       Invasive Devices       Peripheral Intravenous Line  Duration             Peripheral IV 05/22/25 Dorsal (posterior);Right Wrist 1 day    Peripheral IV 05/23/25 Distal;Dorsal (posterior);Left Forearm <1 day                    Physical Exam:   GEN: Ciera Maria appears well, alert and oriented x 3, pleasant and cooperative   CARDIO: RRR, no murmurs or rubs  RESP:  CTAB, no wheezes or rales  ABDOMEN: soft, no tenderness, no distention, fundus firm below the level of the umbilicus  EXTREMITIES: Non tender, no erythema, b/l Melinda's sign negative      Labs:     Hemoglobin   Date Value Ref Range Status   05/22/2025 11.1 (L) 11.5 - 15.4 g/dL Final   03/31/2025 10.9 (L) 11.5 - 15.4 g/dL Final   03/14/2015 13.4 11.5 - 15.4 g/dL Final     WBC   Date Value Ref Range Status   05/22/2025 11.18 (H) 4.31 - 10.16 Thousand/uL Final   03/31/2025 10.52 (H) 4.31 - 10.16 Thousand/uL Final   03/14/2015 6.24 4.31 - 10.16 Thousand/uL Final     Platelets   Date Value Ref Range Status   05/22/2025 212 149 - 390 Thousands/uL Final   03/31/2025 227 149 - 390 Thousands/uL Final   03/14/2015 261 149 - 390 Thousand/uL Final     Comment:     The above 10 analytes were performed by [facility]  [address] [place] [id number]       Creatinine   Date Value Ref Range Status   05/22/2025 0.53 (L) 0.60 - 1.30 mg/dL Final     Comment:     Standardized to IDMS reference method   01/10/2025 0.49 (L) 0.60 - 1.30 mg/dL Final     Comment:     Standardized to IDMS reference method   03/14/2015 0.53 0.5 - 1.5 mg/dL Final     Comment:     Standardized to IDMS " reference method     AST   Date Value Ref Range Status   05/22/2025 14 13 - 39 U/L Final   01/10/2025 13 13 - 39 U/L Final   03/14/2015 16 10 - 42 U/L Final     ALT   Date Value Ref Range Status   05/22/2025 13 7 - 52 U/L Final     Comment:     Specimen collection should occur prior to Sulfasalazine administration due to the potential for falsely depressed results.    01/10/2025 14 7 - 52 U/L Final     Comment:     Specimen collection should occur prior to Sulfasalazine administration due to the potential for falsely depressed results.    03/14/2015 22 6 - 78 U/L Final   02/11/2014 28 6 - 78 U/L Final     Comment:     The above 1 analytes were performed [facility]  [address] [facility] [place] [address]            Sandrita Wiley DO  5/24/2025  7:21 AM

## 2025-05-24 NOTE — DISCHARGE SUMMARY
Discharge Summary - OB/GYN   Name: Ciera Maria 35 y.o. female I MRN: [id number]  Unit/Bed#: -01 I Date of Admission: 2025   Date of Service: 2025 I Hospital Day: 3    ADMISSION  Patient of: [facility]  Admission Date: 2025   Admitting Attending: Dr. Odell  Admitting Diagnoses:   Pregnancy at 37 weeks gestation   A2GDM  Elevated blood pressure without diagnosis of hypertension  Unstable lie  Asthma  Macrosomia  GBS positive    DELIVERY  Delivery Method: Vaginal, Spontaneous   Delivery Date and Time: 2025 8:53 PM  Delivery Attending: Keisha    DISCHARGE  Discharge Date: 25  Discharge Attending: Dr. Valdes  Discharge Diagnosis:   Same, Delivered  Gestational HTN  Insulin controlled GDM in 3rd trimester  Obesity    Clinical course: Admission to Delivery  Ciera Maria is now a 35 y.o. [id number] 37w3d who presented to labor and delivery for induction of labor in the setting of uncontrolled gestational diabetes on insulin. Her pregnancy was notable for unstable lie, macrosomia, and asthma. On presentation, baby was noted to be breech presentation with initial plans for ECV. Prior to ECV, baby was then noted to be cephalic again. SVE was FT/50/-4. She was induced with cytotec x3 doses prior to a dunham balloon being placed. She was then started on pitocin when dislodged. She received an epidural and amniotomy was performed for clear fluid. She progressed to complete cervical dilation and began pushing.     Delivery  Route of Delivery: Vaginal, Spontaneous    Anesthesia: Epidural,   QBL: Non-Surgical QBL (mL): 116        Delivery: Vaginal, Spontaneous at 2025 8:53 PM  Laceration: Hymenal ring laceration, repaired    Baby's Weight: 3260 g (7 lb 3 oz); 114.99    Apgar scores: 8  and 9  at 1 and 5 minutes, respectively    Clinical Course: Post-Delivery:  The post delivery course was unremarkable.    On the day of discharge, the patient was ambulating, voiding  spontaneously, tolerating oral intake, and hemodynamically stable. She was able to reasonably perform all ADLs. She had appropriate bowel function. Pain was well-controlled. She was discharged home on postpartum/postop day #2 without complications. Patient was instructed to follow up with her OB as an outpatient and was given appropriate warnings to call her provider with problems or concerns.    Pertinent lab findings included:   Blood type O+.     Last three Hgb values:  Lab Results   Component Value Date    HGB 11.1 (L) 2025    HGB 10.9 (L) 2025    HGB 11.9 01/10/2025        Problem-specific follow-up plans included the following:  Assessment & Plan   (spontaneous vaginal delivery)  35yoF  postpartum day #2   Patient with gestational HTN, poorly controlled gestational diabetes  GBS positive - received 2 doses of penicillin  Contraception plan - abstinence bridge, discuss at postpartum visit    -Continue routine postpartum care  -Pain well controlled with oral analgesics  -Voiding spontaneously  -Tolerating PO fluids and solids  -Encourage breastfeeding and familial bonding   Insulin controlled gestational diabetes mellitus (GDM) in third trimester  -Gestational diabetes diagnosed in this pregnancy  -Per [facility] note, her last rec was insulin glargin 80u daily, Novolo 10-4-10u before meals (hold meal insulin if not eating). However she was not consistently using insulin at home.   -patient would need 2 hour GTT at her postpartum clinic visit to monitor  -patient had  on this admission so insulin has been discontinued at time of discharge  Mild persistent asthma without complication  Albuterol PRN  Gestational HTN  Meeting criteria for GHTN on 25 (1445-140/86, 1900-124/93)  CBC/CMP WNL   Systolic (24hrs), Av , Min:123 , Max:130   Diastolic (24hrs), Av, Min:76, Max:87  Continue to monitor blood pressure postpartum, currently not on any medication  BMI 40.0-44.9, adult  (HCC)  Weight loss recommended postpartum      Discharge med list:     Medication List      START taking these medications     acetaminophen 325 mg tablet; Commonly known as: TYLENOL; Take 2 tablets   (650 mg total) by mouth every 6 (six) hours as needed for mild pain   benzocaine-menthol-lanolin-aloe 20-0.5 % topical spray; Commonly known   as: DERMOPLAST; Apply 1 Application topically every 6 (six) hours as   needed for mild pain   hydrocortisone 1 % cream; Apply 1 Application topically daily as needed   for irritation   ibuprofen 600 mg tablet; Commonly known as: MOTRIN; Take 1 tablet (600   mg total) by mouth every 6 (six) hours   polyethylene glycol 17 g packet; Commonly known as: MIRALAX; Take 17 g   by mouth daily as needed (constipation)   witch hazel-glycerin topical pad; Commonly known as: TUCKS; Apply 1 Pad   topically every 4 (four) hours as needed for irritation     CONTINUE taking these medications     albuterol 90 mcg/act inhaler; Commonly known as: ProAir HFA; Inhale 2   puffs every 6 (six) hours as needed for wheezing   clotrimazole-betamethasone 1-0.05 % cream; Commonly known as: LOTRISONE;   Apply topically 2 (two) times a day   Contour Next EZ w/Device Kit; Test x4 Daily or as instructed   docusate sodium 100 mg capsule; Commonly known as: COLACE   ferrous sulfate 324 (65 Fe) mg; Take 1 tablet (324 mg total) by mouth   every other day   Microlet Lancets Misc; Use 4 a Day or as instructed   Pen Needles 31G X 5 MM Misc; Use with insulin pen once daily at bedtime   Pepcid 20 mg tablet; Generic drug: famotidine   PRENATAL PO   PROBIOTIC DAILY PO     STOP taking these medications     ascorbic acid 500 mg tablet; Commonly known as: VITAMIN C   aspirin 81 mg chewable tablet   Contour Next Test test strip; Generic drug: glucose blood   Lantus SoloStar 100 units/mL Sopn; Generic drug: Insulin Glargine   Solostar   NovoLOG FlexPen 100 units/mL injection pen; Generic drug: insulin aspart       Condition at  discharge:   good     Disposition:   Home    Planned Readmission:   No    Alma Limon  PGY-2 family medicine resident

## 2025-05-24 NOTE — ASSESSMENT & PLAN NOTE
-Continue routine postpartum care  -Pain well controlled with oral analgesics  -Voiding spontaneously  -Tolerating PO fluids and solids  -Encourage breastfeeding and familial bonding

## 2025-05-25 VITALS
RESPIRATION RATE: 17 BRPM | HEIGHT: 64 IN | OXYGEN SATURATION: 98 % | WEIGHT: 241 LBS | DIASTOLIC BLOOD PRESSURE: 74 MMHG | TEMPERATURE: 98.7 F | HEART RATE: 84 BPM | BODY MASS INDEX: 41.15 KG/M2 | SYSTOLIC BLOOD PRESSURE: 120 MMHG

## 2025-05-25 PROBLEM — O26.899 VAGINAL DISCHARGE IN PREGNANCY: Status: RESOLVED | Noted: 2025-03-02 | Resolved: 2025-05-25

## 2025-05-25 PROBLEM — K52.9 ENTERITIS: Status: RESOLVED | Noted: 2025-01-11 | Resolved: 2025-05-25

## 2025-05-25 PROBLEM — N89.8 VAGINAL DISCHARGE IN PREGNANCY: Status: RESOLVED | Noted: 2025-03-02 | Resolved: 2025-05-25

## 2025-05-25 PROCEDURE — 99024 POSTOP FOLLOW-UP VISIT: CPT | Performed by: OBSTETRICS & GYNECOLOGY

## 2025-05-25 RX ORDER — POLYETHYLENE GLYCOL 3350 17 G/17G
17 POWDER, FOR SOLUTION ORAL DAILY PRN
Start: 2025-05-25

## 2025-05-25 RX ORDER — IBUPROFEN 600 MG/1
600 TABLET, FILM COATED ORAL EVERY 6 HOURS
Start: 2025-05-25

## 2025-05-25 RX ORDER — BENZOCAINE/MENTHOL 6 MG-10 MG
1 LOZENGE MUCOUS MEMBRANE DAILY PRN
Start: 2025-05-25

## 2025-05-25 RX ORDER — ACETAMINOPHEN 325 MG/1
650 TABLET ORAL EVERY 6 HOURS PRN
Start: 2025-05-25

## 2025-05-25 RX ADMIN — IBUPROFEN 600 MG: 600 TABLET ORAL at 05:21

## 2025-05-25 RX ADMIN — IBUPROFEN 600 MG: 600 TABLET ORAL at 11:42

## 2025-05-25 RX ADMIN — ACETAMINOPHEN 650 MG: 325 TABLET, FILM COATED ORAL at 03:19

## 2025-05-25 RX ADMIN — ACETAMINOPHEN 650 MG: 325 TABLET, FILM COATED ORAL at 08:53

## 2025-05-25 NOTE — NURSING NOTE
"Discharge teaching preformed with patient. Education packet provided and reviewed, along with the \"Save your Life\" magnet . Patient verbalized an understanding and was encouraged to continue to ask questions if any prior to discharge.   "

## 2025-05-25 NOTE — PLAN OF CARE
Problem: PAIN - ADULT  Goal: Verbalizes/displays adequate comfort level or baseline comfort level  Description: Interventions:  - Encourage patient to monitor pain and request assistance  - Assess pain using appropriate pain scale  - Administer analgesics as ordered based on type and severity of pain and evaluate response  - Implement non-pharmacological measures as appropriate and evaluate response  - Consider cultural and social influences on pain and pain management  - Notify physician/advanced practitioner if interventions unsuccessful or patient reports new pain  - Educate patient/family on pain management process including their role and importance of  reporting pain   - Provide non-pharmacologic/complimentary pain relief interventions  Outcome: Progressing     Problem: INFECTION - ADULT  Goal: Absence or prevention of progression during hospitalization  Description: INTERVENTIONS:  - Assess and monitor for signs and symptoms of infection  - Monitor lab/diagnostic results  - Monitor all insertion sites, i.e. indwelling lines, tubes, and drains  - Monitor endotracheal if appropriate and nasal secretions for changes in amount and color  - [place] appropriate cooling/warming therapies per order  - Administer medications as ordered  - Instruct and encourage patient and family to use good hand hygiene technique  - Identify and instruct in appropriate isolation precautions for identified infection/condition  Outcome: Progressing  Goal: Absence of fever/infection during neutropenic period  Description: INTERVENTIONS:  - Monitor WBC  - Perform strict hand hygiene  - Limit to healthy visitors only  - No plants, dried, fresh or silk flowers with arita in patient room  Outcome: Progressing     Problem: SAFETY ADULT  Goal: Patient will remain free of falls  Description: INTERVENTIONS:  - Educate patient/family on patient safety including physical limitations  - Instruct patient to call for assistance with activity   -  Consider consulting OT/PT to assist with strengthening/mobility based on AM PAC & JH-HLM score  - Consult OT/PT to assist with strengthening/mobility   - Keep Call bell within reach  - Keep bed low and locked with side rails adjusted as appropriate  - Keep care items and personal belongings within reach  - Initiate and maintain comfort rounds  - Make Fall Risk Sign visible to staff  - Offer Toileting   - Initiate/Maintain alarm  - Obtain necessary fall risk management equipment:   - Apply yellow socks and bracelet for high fall risk patients  - Consider moving patient to room near nurses station  Outcome: Progressing  Goal: Maintain or return to baseline ADL function  Description: INTERVENTIONS:  -  Assess patient's ability to carry out ADLs; assess patient's baseline for ADL function and identify physical deficits which impact ability to perform ADLs (bathing, care of mouth/teeth, toileting, grooming, dressing, etc.)  - Assess/evaluate cause of self-care deficits   - Assess range of motion  - Assess patient's mobility; develop plan if impaired  - Assess patient's need for assistive devices and provide as appropriate  - Encourage maximum independence but intervene and supervise when necessary  - Involve family in performance of ADLs  - Assess for home care needs following discharge   - Consider OT consult to assist with ADL evaluation and planning for discharge  - Provide patient education as appropriate  - Monitor functional capacity and physical performance, use of AM PAC & JH-HLM   - Monitor gait, balance and fatigue with ambulation    Outcome: Progressing  Goal: Maintains/Returns to pre admission functional level  Description: INTERVENTIONS:  - Perform AM-PAC 6 Click Basic Mobility/ Daily Activity assessment daily.  - Set and communicate daily mobility goal to care team and patient/family/caregiver.   - Collaborate with rehabilitation services on mobility goals if consulted  - Perform Range of Motion   -  Reposition patient   - Dangle patient   - Stand patient  - Ambulate patient   - Out of bed to chair   - Out of bed for meals    - Out of bed for toileting  - Record patient progress and toleration of activity level   Outcome: Progressing     Problem: DISCHARGE PLANNING  Goal: Discharge to home or other facility with appropriate resources  Description: INTERVENTIONS:  - Identify barriers to discharge w/patient and caregiver  - Arrange for needed discharge resources and transportation as appropriate  - Identify discharge learning needs (meds, wound care, etc.)  - Arrange for interpretive services to assist at discharge as needed  - Refer to Case Management Department for coordinating discharge planning if the patient needs post-hospital services based on physician/advanced practitioner order or complex needs related to functional status, cognitive ability, or social support system  Outcome: Progressing     Problem: Knowledge Deficit  Goal: Patient/family/caregiver demonstrates understanding of disease process, treatment plan, medications, and discharge instructions  Description: Complete learning assessment and assess knowledge base.  Interventions:  - Provide teaching at level of understanding  - Provide teaching via preferred learning methods  Outcome: Progressing

## 2025-05-25 NOTE — ASSESSMENT & PLAN NOTE
-Gestational diabetes diagnosed in this pregnancy  -Per [facility] note, her last rec was insulin glargin 80u daily, Novolo 10-4-10u before meals (hold meal insulin if not eating). However she was not consistently using insulin at home.   -patient would need 2 hour GTT at her postpartum clinic visit to monitor  -patient had  on this admission so insulin has been discontinued at time of discharge

## 2025-05-25 NOTE — ASSESSMENT & PLAN NOTE
Meeting criteria for GHTN on 25 (1445-140/86, 1900-124/93)  CBC/CMP WNL   Systolic (24hrs), Av , Min:123 , Max:130   Diastolic (24hrs), Av, Min:76, Max:87  Continue to monitor blood pressure postpartum, currently not on any medication

## 2025-05-25 NOTE — ASSESSMENT & PLAN NOTE
-Gestational diabetes diagnosed in this pregnancy  -Per [facility] note, her last rec was insulin glargin 80u daily, Novolo 10-4-10u before meals (hold meal insulin if not eating). However she was not consistently using insulin at home.   -patient would need 2 hour GTT at her postpartum clinic visit to monitor  -patient had  on this admission so insulin has been discontinued at time of discharge

## 2025-05-25 NOTE — PLAN OF CARE
Problem: PAIN - ADULT  Goal: Verbalizes/displays adequate comfort level or baseline comfort level  Description: Interventions:  - Encourage patient to monitor pain and request assistance  - Assess pain using appropriate pain scale  - Administer analgesics as ordered based on type and severity of pain and evaluate response  - Implement non-pharmacological measures as appropriate and evaluate response  - Consider cultural and social influences on pain and pain management  - Notify physician/advanced practitioner if interventions unsuccessful or patient reports new pain  - Educate patient/family on pain management process including their role and importance of  reporting pain   - Provide non-pharmacologic/complimentary pain relief interventions  Outcome: Progressing     Problem: INFECTION - ADULT  Goal: Absence or prevention of progression during hospitalization  Description: INTERVENTIONS:  - Assess and monitor for signs and symptoms of infection  - Monitor lab/diagnostic results  - Monitor all insertion sites, i.e. indwelling lines, tubes, and drains  - Monitor endotracheal if appropriate and nasal secretions for changes in amount and color  - [place] appropriate cooling/warming therapies per order  - Administer medications as ordered  - Instruct and encourage patient and family to use good hand hygiene technique  - Identify and instruct in appropriate isolation precautions for identified infection/condition  Outcome: Progressing  Goal: Absence of fever/infection during neutropenic period  Description: INTERVENTIONS:  - Monitor WBC  - Perform strict hand hygiene  - Limit to healthy visitors only  - No plants, dried, fresh or silk flowers with arita in patient room  Outcome: Progressing     Problem: SAFETY ADULT  Goal: Patient will remain free of falls  Description: INTERVENTIONS:  - Educate patient/family on patient safety including physical limitations  - Instruct patient to call for assistance with activity   -  Consider consulting OT/PT to assist with strengthening/mobility based on AM PAC & JH-HLM score  - Consult OT/PT to assist with strengthening/mobility   - Keep Call bell within reach  - Keep bed low and locked with side rails adjusted as appropriate  - Keep care items and personal belongings within reach  - Initiate and maintain comfort rounds  - Make Fall Risk Sign visible to staff  - Offer Toileting in advance of need    Outcome: Progressing  Goal: Maintain or return to baseline ADL function  Description: INTERVENTIONS:  -  Assess patient's ability to carry out ADLs; assess patient's baseline for ADL function and identify physical deficits which impact ability to perform ADLs (bathing, care of mouth/teeth, toileting, grooming, dressing, etc.)  - Assess/evaluate cause of self-care deficits   - Assess range of motion  - Assess patient's mobility; develop plan if impaired  - Assess patient's need for assistive devices and provide as appropriate  - Encourage maximum independence but intervene and supervise when necessary  - Involve family in performance of ADLs  - Assess for home care needs following discharge   - Consider OT consult to assist with ADL evaluation and planning for discharge  - Provide patient education as appropriate  - Monitor functional capacity and physical performance, use of AM PAC & JH-HLM   - Monitor gait, balance and fatigue with ambulation    Outcome: Progressing  Goal: Maintains/Returns to pre admission functional level  Description: INTERVENTIONS:  - Perform AM-PAC 6 Click Basic Mobility/ Daily Activity assessment daily.  - Set and communicate daily mobility goal to care team and patient/family/caregiver.   - Collaborate with rehabilitation services on mobility goals if consulted  - Out of bed for toileting  - Record patient progress and toleration of activity level   Outcome: Progressing     Problem: DISCHARGE PLANNING  Goal: Discharge to home or other facility with appropriate  resources  Description: INTERVENTIONS:  - Identify barriers to discharge w/patient and caregiver  - Arrange for needed discharge resources and transportation as appropriate  - Identify discharge learning needs (meds, wound care, etc.)  - Arrange for interpretive services to assist at discharge as needed  - Refer to Case Management Department for coordinating discharge planning if the patient needs post-hospital services based on physician/advanced practitioner order or complex needs related to functional status, cognitive ability, or social support system  Outcome: Progressing     Problem: Knowledge Deficit  Goal: Patient/family/caregiver demonstrates understanding of disease process, treatment plan, medications, and discharge instructions  Description: Complete learning assessment and assess knowledge base.  Interventions:  - Provide teaching at level of understanding  - Provide teaching via preferred learning methods  Outcome: Progressing

## 2025-05-25 NOTE — PROGRESS NOTES
Progress Note - OB/GYN   Name: Ciera Maria 35 y.o. female I MRN: [id number]  Unit/Bed#: -01 I Date of Admission: 2025   Date of Service: 2025 I Hospital Day: 3    Assessment & Plan   (spontaneous vaginal delivery)  35yoF  postpartum day #2   Patient with gestational HTN, poorly controlled gestational diabetes  GBS positive - received 2 doses of penicillin  Contraception plan - abstinence bridge, discuss at postpartum visit    -Continue routine postpartum care  -Pain well controlled with oral analgesics  -Voiding spontaneously  -Tolerating PO fluids and solids  -Encourage breastfeeding and familial bonding   Insulin controlled gestational diabetes mellitus (GDM) in third trimester  -Gestational diabetes diagnosed in this pregnancy  -Per [facility] note, her last rec was insulin glargin 80u daily, Novolo 10-4-10u before meals (hold meal insulin if not eating). However she was not consistently using insulin at home.   -patient would need 2 hour GTT at her postpartum clinic visit to monitor  -patient had  on this admission so insulin has been discontinued at time of discharge  Mild persistent asthma without complication  Albuterol PRN  Gestational HTN  Meeting criteria for GHTN on 25 (1445-140/86, 1900-124/93)  CBC/CMP WNL   Systolic (24hrs), Av , Min:123 , Max:130   Diastolic (24hrs), Av, Min:76, Max:87  Continue to monitor blood pressure postpartum, currently not on any medication  BMI 40.0-44.9, adult (HCC)  Weight loss recommended postpartum    OB Post-Partum Progress Note  Subjective   Post delivery. Patient is doing well. Lochia WNL. Pain well controlled.     Pain: yes, cramping, improved with meds  Tolerating PO: yes  Voiding: yes  Flatus: yes  BM: yes  Ambulating: yes  Breastfeeding:  yes  Chest pain: no  Shortness of breath: no  Leg pain: no  Lochia: WNL    Objective :  Temp:  [97.5 °F (36.4 °C)-97.9 °F (36.6 °C)] 97.5 °F (36.4 °C)  HR:  [66-79] 66  BP:  (123-130)/(76-87) 123/76  Resp:  [17-18] 18  SpO2:  [97 %-99 %] 97 %  O2 Device: None (Room air)    Physical Exam  Constitutional:       General: She is not in acute distress.  HENT:      Head: Normocephalic and atraumatic.     Cardiovascular:      Rate and Rhythm: Normal rate.   Pulmonary:      Effort: Pulmonary effort is normal. No respiratory distress.   Abdominal:      General: There is no distension.     Skin:     General: Skin is warm and dry.     Neurological:      General: No focal deficit present.     Psychiatric:         Mood and Affect: Mood normal.           Lab Results: I have reviewed the following results:  Lab Results   Component Value Date    WBC 11.18 (H) 05/22/2025    HGB 11.1 (L) 05/22/2025    HCT 33.2 (L) 05/22/2025    MCV 85 05/22/2025     05/22/2025

## 2025-05-25 NOTE — LACTATION NOTE
This note was copied from a baby's chart.  CONSULT - LACTATION  Baby Boy (Keaton Maria 2 days male MRN: [id number]    [facility] NURSERY Room / Bed: (N)/(N) Encounter: [id number]    Maternal Information     MOTHER:  Ciera Maria  Maternal Age: 35 y.o.  OB History: # 1 - Date: [birth year]11, Sex: Male, Weight: 3487 g (7 lb 11 oz), GA: 40w0d, Type: Vaginal, Spontaneous, Apgar1: None, Apgar5: None, Living: Living, Birth Comments: None    # 2 - Date: 03/28/17, Sex: Male, Weight: 3322 g (7 lb 5.2 oz), GA: 37w2d, Type: Vaginal, Spontaneous, Apgar1: 9, Apgar5: 9, Living: Living, Birth Comments: none    # 3 - Date: 2021, Sex: None, Weight: None, GA: 7w0d, Type: None, Apgar1: None, Apgar5: None, Living: None, Birth Comments: None    # 4 - Date: 05/23/25, Sex: Male, Weight: 3260 g (7 lb 3 oz), GA: 37w3d, Type: Vaginal, Spontaneous, Apgar1: 8, Apgar5: 9, Living: Living, Birth Comments: None   Previous breast reduction surgery? No    Lactation history:   Has patient previously breast fed: Yes   How long had patient previously breast fed: 8 yo and 16 yo for 3-4 mnos.   Previous breast feeding complications: Low milk supply, Other (Comment) (surgery and back to work.)     Past Surgical History:   Procedure Laterality Date    CHOLECYSTECTOMY      WISDOM TOOTH EXTRACTION         Birth information:  YOB: 2024   Time of birth: 8:53 PM   Sex: male   Delivery type: Vaginal, Spontaneous   Birth Weight: 3260 g (7 lb 3 oz)   Percent of Weight Change: -4%     Gestational Age: 37w3d   [unfilled]    Reason for Consult:    Reason for Consult: Initial assessment (routine) - 10 min, Latch Assess (routine) - 10 min, Discharge (routine) - 10 min    Risk Factors:         Breast and nipple assessment:   Breasts/Nipples  Left Breast: Soft, Other (Comment) (large)  Right Breast: Soft (large)  Left Nipple: Everted, Tender  Right Nipple: Everted, Tender, Blisters (small blister at  nipple tip from shallow latch.)  Intervention: Hand expression, Lanolin  Breastfeeding Progress: Not yet established, Breastfeeding well    OB Lactation Tools:    Other OB Lactation Tools  Feeding Devices: Lanolin    Breast Pump:    Breast Pump  Pump: Personal (Has a Breast Pump for home.)  Pump Review/Education: Setup, frequency, and cleaning, Milk storage (Information provided.)       Assessment: restricted tongue movement, appears to have some restriction posterior under tongue,   However baby has a strong suck and curls well around gloved finger.    Feeding assessment: feeding well  LATCH:  Latch: Grasps breast, tongue down, lips flanged, rhythmic sucking   Audible Swallowing: Spontaneous and intermittent (24 hours old)   Type of Nipple: Everted (After stimulation)   Comfort (Breast/Nipple): Filling, red/small blisters/bruises, mild/moderate discomfort (right nipple small blister at tip from shallow latch.)   Hold (Positioning): No assist from staff, mother able to position/hold infant (just needed verbal reassurance with positioning.)   LATCH Score: 9       Feeding recommendations:  Place baby to the breast every 2-3 hours, more frequently if baby is cueing.    Met with parents and the Ready Set Baby and Discharge Breastfeeding Booklets were provided and contents briefly reviewed.    Booklets included :  Hand expression with access to QR codes to review hand expression.  Positioning and latch as well as showing images of other feeding positions.  Properties of a good latch in any position.   Feeding on cue and what that means for recognizing infant's hunger.  Skin to Skin contact and benefits to mom and baby  Avoidance of pacifiers for the first month.   Second Night Syndrome  Discussed s/s engorgement, blocked milk ducts, and mastitis. Discussed how to remedy at home and when to contact physician. Physical Therapy information also provided.  Discussed with mom how to utilize feeding log & monitor baby's  output. Education on signs of satiation during a feeding, size of baby's belly, and offering both breasts at every feeding session provided.        Reviewed positioning and Latch with Mom:      - Bring baby to breast and not breast to baby ( no hunching over ).   - Use pillow support.   - Baby's ear, shoulder, hip in alignment   - Align nose to nipple and drag nipple down to chin to achieve a wide open mouth.   - Baby's chin and lower lip touching the breast first which will aim the nipple up towards      the roof of the baby's mouth for a deeper latch.   - Both baby's cheeks should be touching mom's breast and a rocking motion should be        noted at baby's jaw line.   - Baby's upper and lower lip should be flanged on the breast.    Ciera is scheduled for discharge to home with her baby boy today, all questions were answered. Encouraged to follow [facility] at [facility] for breastfeeding support as needed. Information provided.  Swapna Dennis RN 5/25/2025 1:40 PM

## 2025-05-25 NOTE — ASSESSMENT & PLAN NOTE
35yoF  postpartum day #2   Patient with gestational HTN, poorly controlled gestational diabetes  GBS positive - received 2 doses of penicillin  Contraception plan - abstinence bridge, discuss at postpartum visit    -Continue routine postpartum care  -Pain well controlled with oral analgesics  -Voiding spontaneously  -Tolerating PO fluids and solids  -Encourage breastfeeding and familial bonding

## 2025-05-27 NOTE — UTILIZATION REVIEW
"Mother and baby discharged 2025       NOTIFICATION OF INPATIENT ADMISSION   MATERNITY/DELIVERY AUTHORIZATION REQUEST   SERVICING FACILITY:   [facility] - L&D, [place], NICU  [address] [place] [address]  Tax ID: [id number]  NPI: [id number] ATTENDING PROVIDER:  Attending Name and NPI#: Elayne Valdes Md [[id number]]  Address: [address] [place] [address]  Phone: [phone]     ADMISSION INFORMATION:  Place of Service: Inpatient [facility]  Place of Service Code: 21  Inpatient Admission Date/Time: 25  8:19 AM  Discharge Date/Time: 2025  3:01 PM  Admitting Diagnosis Code/Description:  Encounter for planned induction of labor [Z34.90]   Mother: Ciera Maria 1990 Estimated Date of Delivery: 6/10/25  Delivering clinician: Elayne Valdes   OB History          4    Para   3    Term   3            AB   1    Living   3         SAB   1    IAB        Ectopic        Multiple   0    Live Births   3           Obstetric Comments   Preeclampsia, third trimester               [place] Name & MRN:   Information for the patient's :  Dejon Maria [[id number]]   [place] Delivery Information:  Sex: male  Delivered 2025 8:53 PM by Vaginal, Spontaneous; Gestational Age: 37w3d     Measurements:  Weight: 7 lb 3 oz (3260 g);  Height: 20\"    APGAR 1 minute 5 minutes 10 minutes   Totals: 8 9       UTILIZATION REVIEW CONTACT:  Ange Pritchard, Utilization   Network Utilization Review Department  Phone: [phone]  Fax [phone]  Email: [email or web address]  Contact for approvals/pending authorizations, clinical reviews, and discharge.   PHYSICIAN ADVISORY SERVICES:  Medical Necessity Denial & Qzxj-tz-Upuh Review  Phone: [phone]  Fax: [phone]  Email: [email or web address]   DISCHARGE SUPPORT TEAM:  For Patients Discharge Needs & Updates  Phone: " [phone] opt. 2 Fax: [phone]  Email: [email or web address]

## 2025-05-28 ENCOUNTER — TELEPHONE (OUTPATIENT)
Age: 35
End: 2025-05-28

## 2025-05-28 NOTE — TELEPHONE ENCOUNTER
POSTPARTUM PHONE CALL ASSESSMENT    Date of Delivery: 25    Delivering Provider: Dr Valdes    Mode:     Delivery Notes/Complications: No complications    How are you doing physically/emotionally? Patient states she is doing well. States that she has developed a rash on stomach and buttocks, encouraged pt to use OTC hydrocortisone cream, verbalized understanding    Breastfeeding/Formula/Both? Breastfeeding    Do you still have bleeding? yes  If so, how much/how severe? light vaginal bleeding    Do you have any pain? no  If so, how much/how severe? no pain     Regular BMs/Urination? yes- states looser stools than normal    Do you have any other questions or concerns for us or your provider? no     Have you scheduled the pediatrician appointment with pediatrician? Yes - will be starting phototherapy later today    Do you have a postpartum visit scheduled? no  Will send msg to clerical to reach out to pt to schedule PPV

## 2025-05-29 ENCOUNTER — TELEPHONE (OUTPATIENT)
Dept: OBGYN CLINIC | Facility: MEDICAL CENTER | Age: 35
End: 2025-05-29

## 2025-05-29 NOTE — TELEPHONE ENCOUNTER
----- Message from Bethany COLBERT sent at 2025  2:45 PM EDT -----  Regarding: PP visit  Please reach out to Ciera to schedule PPV, delivered  25 w/Dr Valdes - would preferably like to see Dr Hayden.  Thank you!  Bethany

## 2025-05-30 LAB — PLACENTA IN STORAGE: NORMAL

## 2025-06-02 DIAGNOSIS — Z86.32 HISTORY OF GESTATIONAL DIABETES: ICD-10-CM

## 2025-06-02 DIAGNOSIS — Z13.1 DIABETES MELLITUS SCREENING: Primary | ICD-10-CM

## 2025-06-17 NOTE — PROGRESS NOTES
Post partum Visit       - -   male 7.3 pounds:    2.  Breast feeding - exclusive or supplementation    Breast pain or mastitis   [facility] for lactation     3.  Post partum depression screening    Risk - 7   Support at [facility]    4.  Sex - not yet     5.  Contraception / future fertility - thinking about tubal     6.  Return to work     7.  Weight    Starting -206   Delivery - 241   Total gain - 35      Current - 214    8.  Annual    Pap  - 23- neg neg   Next due- taryn for annual exam     9.  GDM -  Adult GTT      10.  Burning urination    UC sent    Only lasted about 1 day

## 2025-06-18 ENCOUNTER — POSTPARTUM VISIT (OUTPATIENT)
Dept: OBGYN CLINIC | Facility: MEDICAL CENTER | Age: 35
End: 2025-06-18

## 2025-06-18 VITALS
SYSTOLIC BLOOD PRESSURE: 110 MMHG | DIASTOLIC BLOOD PRESSURE: 70 MMHG | HEIGHT: 64 IN | BODY MASS INDEX: 36.54 KG/M2 | WEIGHT: 214 LBS

## 2025-06-18 DIAGNOSIS — R30.0 BURNING WITH URINATION: ICD-10-CM

## 2025-06-18 PROCEDURE — 99024 POSTOP FOLLOW-UP VISIT: CPT | Performed by: OBSTETRICS & GYNECOLOGY

## 2025-06-18 PROCEDURE — 87086 URINE CULTURE/COLONY COUNT: CPT | Performed by: OBSTETRICS & GYNECOLOGY

## 2025-06-19 LAB — BACTERIA UR CULT: NORMAL

## 2025-06-23 ENCOUNTER — OFFICE VISIT (OUTPATIENT)
Dept: POSTPARTUM | Facility: CLINIC | Age: 35
End: 2025-06-23
Payer: COMMERCIAL

## 2025-06-23 DIAGNOSIS — R52 PAIN AGGRAVATED BY BREAST FEEDING: ICD-10-CM

## 2025-06-23 DIAGNOSIS — O92.79 INSUFFICIENT LACTATION: ICD-10-CM

## 2025-06-23 DIAGNOSIS — O92.79 PAIN AGGRAVATED BY BREAST FEEDING: ICD-10-CM

## 2025-06-23 PROCEDURE — 99404 PREV MED CNSL INDIV APPRX 60: CPT | Performed by: PEDIATRICS

## 2025-06-23 RX ORDER — NYSTATIN 100000 U/G
1 OINTMENT TOPICAL 2 TIMES DAILY
COMMUNITY

## 2025-06-23 NOTE — PROGRESS NOTES
INITIAL BREAST FEEDING EVALUATION    Informant/Relationship: Ciera    Discussion of General Lactation Issues: Breastfeeding has been uncomfortable since the beginning.  Ciera feels that Dejon does not latch deeply onto the breast.  Recently, peds recommended formula supplementation due to slower than expected weight gain.  Dejon is currently being treated for thrush in his mouth and his Peds gave Ciera topical antifungals to use as well.  She feels she has a bleb on her left nipple but otherwise no symptoms of thrush.    Infant is 4 weeks old today. Born at 37 3/7 weeks     History:  Fertility Problem:no  Breast changes:yes - breasts were tender and hendrix, areolas were larger and darker  : yes - IOL due to uncontrolled blood sugars.  Had an epidural.  No complications.  Full term:37 3/7 weeks   labor:no  First nursing/attempt < 1 hour after birth:yes - baby latched during STS in the delivery room  Skin to skin following delivery:yes - immediately in the delivery room  Breast changes after delivery:yes - breasts are hendrix.  Milk was in by day 2-3  Rooming in (infant in room with mother with exception of procedures, eg. Circumcision: yes  Blood sugar issues:no  NICU stay:no  Jaundice:yes  Phototherapy:after discharge  Supplement given: (list supplement and method used as well as reason(s):no    Past Medical History[1]    Current Medications[2]    No Known Allergies    Social History     Substance and Sexual Activity   Drug Use No       Social History Never a smoker    Interval Breastfeeding History:  Frequency of breast feeding: On demand every 2-2.5 hours  Does mother feel breastfeeding is effective: If no, explain: Ciera feels that Dejon is not feeding long enough. Since bottles were introduced, he is less willing to spend time at the breast.   Does infant appear satisfied after nursing:No  Stooling pattern normal: Yes  Urinating frequently:Yes  Using shield or shells:  No    Alternative/Artificial Feedings:   Bottle: Yes, recently to supplement after every feeding. Using [employer] bottles.  Not familiar with paced feeding technique.  Cup: No  Syringe/Finger: No           Formula Type: Similac Sensitive                     Amount: 2 ounces after every feeding at the breast.            Breast Milk:                      Amount: 1 ounce when available when Dejon does not latch or nurse well.            Frequency Q 2-3 Hr between feedings  Elimination Problems: No      Equipment:  Nipple Shield             Type: none             Size: n/a             Frequency of Use: n/a  Pump            Type: Spectra S2, [place]            Frequency of Use: currently, pumping after most feedings.  Expresses about 1-2 ounces total each time she pumps.  Shells            Type: none            Frequency of use: n/a    Equipment Problems: yes Ciera feels that her flanges do not fit well.    Mom:  Breast: large, pendulous symmetrical breasts.  Rounded shape.  Closely spaced.  Nipple Assessment in General: Everted nipples bilaterally. There is a large bleb on the face of the left nipple at about 2 o'clock  Mother's Awareness of Feeding Cues                 Recognizes: Yes                  Verbalizes: Yes  Support System: FOB, older children and extended family  History of Breastfeeding:  her oldest child for 2 months.  Supplemented with formula the entire time because baby was having trouble latching and Ciera felt he wasn't getting enough.   her middle child for 2-3 months with similar issues with trouble latching and baby not getting enough at the breast.  Changes/Stressors/Violence: Ciera is concerned that Dejon is not latching effectively or getting what he needs at the breast.  Concerns/Goals: Ciera desires that her baby be well fed and healthy.  She would prefer to provide all of the milk that her baby needs.    Problems with Mom: Low milk production    Physical  Exam  Constitutional:       Appearance: Normal appearance.   HENT:      Head: Normocephalic and atraumatic.      Nose: Nose normal.   Pulmonary:      Effort: Pulmonary effort is normal.     Musculoskeletal:         General: Normal range of motion.      Cervical back: Normal range of motion and neck supple.     Neurological:      Mental Status: She is alert and oriented to person, place, and time.     Skin:     General: Skin is warm and dry.     Psychiatric:         Mood and Affect: Mood normal.         Behavior: Behavior normal.         Thought Content: Thought content normal.         Judgment: Judgment normal.         Infant:  Behaviors: Alert  Color: Normal  Birth weight: 3260 grams  Current weight: 3680 grams    Problems with infant: early term infant with slow weight gain      General Appearance:  Alert, active, no distress                             Head:  Normocephalic, AFOF, sutures opposed                             Eyes:  Conjunctiva clear, no drainage                              Ears:  Normally placed, no anomolies                             Nose:  No drainage or erythema                           Mouth:  Irregularly shape white lesions on the buccal mucosa bilaterally. The tongue extends beyond the lower lip, lateralizes well bilaterally and the tip elevates without restriction to near the palate. Effective cupping and peristalsis felt as he sucked on my finger.                    Neck:  Supple, symmetrical, trachea midline                 Respiratory:  No grunting, flaring, retractions, breath sounds clear and equal            Cardiovascular:  Regular rate and rhythm. No murmur. Adequate perfusion/capillary refill. Femoral pulse present                    Abdomen:   Soft, non-tender, no masses, bowel sounds present, no HSM             Genitourinary:  Normal male, testes descended, no discharge, swelling, or pain, anus patent                          Spine:   No abnormalities noted         Musculoskeletal:  Full range of motion          Skin/Hair/Nails:   Skin warm, dry, and intact, no rashes or abnormal dyspigmentation or lesions                Neurologic:   No abnormal movement, tone appropriate for gestational age     Latch:  Efficiency:               Lips Flanged: Yes              Depth of latch: good              Audible Swallow: Yes, but several sucks were observed between each swallow              Visible Milk: Yes              Wide Open/ Asymmetrical: Yes              Suck Swallow Cycle: Breathing: unlabored, Coordinated: yes  Nipple Assessment after latch: Normal: elongated/eraser, no discoloration and no damage noted.  Latch Problems: Dejon had no trouble latching and Ciera reported that the latch was comfortable.  As he fed, several sucks were observed prior to each swallow. Within a few minutes, primarily NNS was observed.  When he was no longer making effort at the right breast, the left breast was offered.  He was quickly frustrated and refused to latch.  Ciera reported that this has been happening more and more frequently lately.  He had transferred 40 grams of milk from the right breast and the feeding was completed with a bottle of formula.  He took about an ounce of formula.    Position:  Infant's Ergonomics/Body               Body Alignment: Yes               Head Supported: Yes               Close to Mom's body/ Lifted/ Supported: Yes               Mom's Ergonomics/Body: Yes                           Supported: Yes                           Sitting Back: Yes                           Brings Baby to her breast: Yes  Positioning Problems: None      Handouts:   Paced bottle feeding and Increasing your supply    Education:  Reviewed Frequency/Supply & Demand: discussed how milk production is regulated and methods for increasing milk production  Reviewed Alternative/Artificial Feedings: discussed and demonstrated paced bottle feeding.  Reviewed Mom/Breast care: reviewed  treatment for Ciera's nipple bleb  Reviewed Equipment: discussed effective use of the Spectra S2 and how to determine what size flange to use.      Plan:    Reassurance and support given.  I acknowledged Ciera's concerns and her commitment to breastfeeding.  I encouraged her to continue breastfeeding as often as she desires.  I recommended that she pump if a feeding at the breast is missed and as desired after breastfeeding.  I reviewed treatment to resolve her nipple bleb.  We discussed supplements that may help with milk production.  I recommended that Ciera schedule a follow up appointment with breastfeeding medicine if she would like more support for milk production or if her bleb does not resolve with the treatment recommended today.  I encouraged her to call with any questions or concerns.    I have spent 90 minutes with Patient and family today in which greater than 50% of this time was spent in counseling/coordination of care regarding Patient and family education and Counseling / Coordination of care.              [1]   Past Medical History:  Diagnosis Date    Allergic     Allergic rhinitis     Asthma     dx as child    Elevated hemoglobin A1c     Gestational hypertension     A2GDM 2nd pregnancy    H/O postpartum hemorrhage, currently pregnant     with both pregnancies    Headache     Hx of preeclampsia, prior pregnancy, currently pregnant     Iron deficiency anemia     PO Fe in 3rd trimester    Macrosomia of fetus affecting management of mother in third trimester 04/16/2025    EFW 92% AC 99% at 32 weeks  Repeat growth 4 weeks      Prediabetes 05/18/2023    Urinary tract infection     Varicella     had vaccine    Visual impairment     Vitamin D deficiency     PO D3 OTC   [2]   Current Outpatient Medications:     acetaminophen (TYLENOL) 325 mg tablet, Take 2 tablets (650 mg total) by mouth every 6 (six) hours as needed for mild pain (Patient not taking: Reported on 6/18/2025), Disp: , Rfl:      albuterol (ProAir HFA) 90 mcg/act inhaler, Inhale 2 puffs every 6 (six) hours as needed for wheezing, Disp: 8.5 g, Rfl: 1    benzocaine-menthol-lanolin-aloe (DERMOPLAST) 20-0.5 % topical spray, Apply 1 Application topically every 6 (six) hours as needed for mild pain, Disp: , Rfl:     clotrimazole-betamethasone (LOTRISONE) 1-0.05 % cream, Apply topically 2 (two) times a day (Patient taking differently: Apply topically in the morning and in the evening. PRN .), Disp: 45 g, Rfl: 1    docusate sodium (COLACE) 100 mg capsule, Take 100 mg by mouth in the morning and 100 mg in the evening., Disp: , Rfl:     famotidine (Pepcid) 20 mg tablet, Take 20 mg by mouth as needed for heartburn or indigestion, Disp: , Rfl:     ferrous sulfate 324 (65 Fe) mg, Take 1 tablet (324 mg total) by mouth every other day (Patient not taking: Reported on 6/18/2025), Disp: 90 tablet, Rfl: 0    hydrocortisone 1 % cream, Apply 1 Application topically daily as needed for irritation, Disp: , Rfl:     ibuprofen (MOTRIN) 600 mg tablet, Take 1 tablet (600 mg total) by mouth every 6 (six) hours (Patient not taking: Reported on 6/18/2025), Disp: , Rfl:     Insulin Pen Needle (Pen Needles) 31G X 5 MM MISC, Use with insulin pen once daily at bedtime, Disp: 100 each, Rfl: 3    Microlet Lancets MISC, Use 4 a Day or as instructed, Disp: 100 each, Rfl: 5    polyethylene glycol (MIRALAX) 17 g packet, Take 17 g by mouth daily as needed (constipation) (Patient not taking: Reported on 6/18/2025), Disp: , Rfl:     Prenatal Vit-Fe Fumarate-FA (PRENATAL PO), Take 1 tablet by mouth in the morning, Disp: , Rfl:     Probiotic Product (PROBIOTIC DAILY PO), Take 1 tablet by mouth in the morning (Patient not taking: Reported on 6/18/2025), Disp: , Rfl:     witch hazel-glycerin (TUCKS) topical pad, Apply 1 Pad topically every 4 (four) hours as needed for irritation (Patient not taking: Reported on 6/18/2025), Disp: , Rfl:

## 2025-06-23 NOTE — PATIENT INSTRUCTIONS
"Continue breastfeeding as often as you desire.  Pump if a feeding at the breast is missed, for comfort and after feeding as often as you feel comfortable. When pumping, cycle your pump through stimulation and expression mode several times in a session to stimulate several let downs until you have expressed enough milk to feed the baby and to achieve breast comfort.  There is no need to \"empty\" the breast completely. Use gentle hands on pumping and hand expression   Maintain your pump as recommended. Use flange that fits comfortably and allows the breast to empty effectively.    To resolve your nipple bleb, apply a protective oil/ointment to your nipple after feeding and cover with a clean pieced of parchment paper.  4 times a day, in addition to the oil, apply a scant amount of 1% hydrocortisone cream with the tip of a toothpick just to the bleb.   Refer to your handout about supplements that can help increase milk production.  Please call with any questions or concerns.  "

## 2025-06-29 NOTE — PROGRESS NOTES
I have reviewed the notes, assessments, and/or procedures performed by Earlene Puente RN, IBCLC, I concur with her/his documentation of Ciera Sosa MD 06/29/25

## 2025-08-05 ENCOUNTER — APPOINTMENT (OUTPATIENT)
Dept: LAB | Facility: CLINIC | Age: 35
End: 2025-08-05

## 2025-08-05 DIAGNOSIS — Z00.8 HEALTH EXAMINATION IN POPULATION SURVEY: ICD-10-CM

## 2025-08-05 LAB
CHOLEST SERPL-MCNC: 139 MG/DL (ref ?–200)
EST. AVERAGE GLUCOSE BLD GHB EST-MCNC: 128 MG/DL
HBA1C MFR BLD: 6.1 %
HDLC SERPL-MCNC: 69 MG/DL
LDLC SERPL CALC-MCNC: 57 MG/DL (ref 0–100)
NONHDLC SERPL-MCNC: 70 MG/DL
TRIGL SERPL-MCNC: 66 MG/DL (ref ?–150)

## 2025-08-05 PROCEDURE — 80061 LIPID PANEL: CPT

## 2025-08-05 PROCEDURE — 36415 COLL VENOUS BLD VENIPUNCTURE: CPT

## 2025-08-05 PROCEDURE — 83036 HEMOGLOBIN GLYCOSYLATED A1C: CPT
